# Patient Record
Sex: FEMALE | Race: WHITE | NOT HISPANIC OR LATINO | Employment: UNEMPLOYED | URBAN - METROPOLITAN AREA
[De-identification: names, ages, dates, MRNs, and addresses within clinical notes are randomized per-mention and may not be internally consistent; named-entity substitution may affect disease eponyms.]

---

## 2018-01-01 ENCOUNTER — APPOINTMENT (OUTPATIENT)
Dept: ONCOLOGY | Facility: CLINIC | Age: 58
End: 2018-01-01

## 2018-01-01 ENCOUNTER — APPOINTMENT (OUTPATIENT)
Dept: GENERAL RADIOLOGY | Facility: HOSPITAL | Age: 58
End: 2018-01-01

## 2018-01-01 ENCOUNTER — DOCUMENTATION (OUTPATIENT)
Dept: ONCOLOGY | Facility: CLINIC | Age: 58
End: 2018-01-01

## 2018-01-01 ENCOUNTER — ANESTHESIA (OUTPATIENT)
Dept: GASTROENTEROLOGY | Facility: HOSPITAL | Age: 58
End: 2018-01-01

## 2018-01-01 ENCOUNTER — OFFICE VISIT (OUTPATIENT)
Dept: ONCOLOGY | Facility: CLINIC | Age: 58
End: 2018-01-01

## 2018-01-01 ENCOUNTER — HOSPITAL ENCOUNTER (OUTPATIENT)
Facility: HOSPITAL | Age: 58
Setting detail: HOSPITAL OUTPATIENT SURGERY
End: 2018-01-29
Attending: INTERNAL MEDICINE | Admitting: INTERNAL MEDICINE

## 2018-01-01 ENCOUNTER — TELEPHONE (OUTPATIENT)
Dept: ONCOLOGY | Facility: HOSPITAL | Age: 58
End: 2018-01-01

## 2018-01-01 ENCOUNTER — LAB (OUTPATIENT)
Dept: LAB | Facility: HOSPITAL | Age: 58
End: 2018-01-01

## 2018-01-01 ENCOUNTER — DOCUMENTATION (OUTPATIENT)
Dept: ONCOLOGY | Facility: HOSPITAL | Age: 58
End: 2018-01-01

## 2018-01-01 ENCOUNTER — APPOINTMENT (OUTPATIENT)
Dept: CT IMAGING | Facility: HOSPITAL | Age: 58
End: 2018-01-01

## 2018-01-01 ENCOUNTER — APPOINTMENT (OUTPATIENT)
Dept: CT IMAGING | Facility: HOSPITAL | Age: 58
End: 2018-01-01
Attending: INTERNAL MEDICINE

## 2018-01-01 ENCOUNTER — INFUSION (OUTPATIENT)
Dept: ONCOLOGY | Facility: HOSPITAL | Age: 58
End: 2018-01-01

## 2018-01-01 ENCOUNTER — APPOINTMENT (OUTPATIENT)
Dept: OTHER | Facility: HOSPITAL | Age: 58
End: 2018-01-01

## 2018-01-01 ENCOUNTER — HOSPITAL ENCOUNTER (INPATIENT)
Facility: HOSPITAL | Age: 58
LOS: 30 days | Discharge: HOME HEALTH | End: 2018-02-02
Attending: PHYSICAL MEDICINE & REHABILITATION | Admitting: PHYSICAL MEDICINE & REHABILITATION

## 2018-01-01 ENCOUNTER — APPOINTMENT (OUTPATIENT)
Dept: GENERAL RADIOLOGY | Facility: HOSPITAL | Age: 58
End: 2018-01-01
Attending: INTERNAL MEDICINE

## 2018-01-01 ENCOUNTER — APPOINTMENT (OUTPATIENT)
Dept: LAB | Facility: HOSPITAL | Age: 58
End: 2018-01-01

## 2018-01-01 ENCOUNTER — RADIATION ONCOLOGY WEEKLY ASSESSMENT (OUTPATIENT)
Dept: RADIATION ONCOLOGY | Facility: HOSPITAL | Age: 58
End: 2018-01-01

## 2018-01-01 ENCOUNTER — APPOINTMENT (OUTPATIENT)
Dept: PET IMAGING | Facility: HOSPITAL | Age: 58
End: 2018-01-01

## 2018-01-01 ENCOUNTER — DOCUMENTATION (OUTPATIENT)
Dept: RADIATION ONCOLOGY | Facility: HOSPITAL | Age: 58
End: 2018-01-01

## 2018-01-01 ENCOUNTER — HOSPITAL ENCOUNTER (OUTPATIENT)
Dept: CT IMAGING | Facility: HOSPITAL | Age: 58
Discharge: HOME | End: 2018-03-09
Attending: INTERNAL MEDICINE | Admitting: INTERNAL MEDICINE

## 2018-01-01 ENCOUNTER — APPOINTMENT (OUTPATIENT)
Dept: CARDIOLOGY | Facility: HOSPITAL | Age: 58
End: 2018-01-01
Attending: INTERNAL MEDICINE

## 2018-01-01 ENCOUNTER — HOSPITAL ENCOUNTER (OUTPATIENT)
Dept: MRI IMAGING | Facility: HOSPITAL | Age: 58
Discharge: HOME | End: 2018-03-09
Attending: INTERNAL MEDICINE

## 2018-01-01 ENCOUNTER — HOSPITAL ENCOUNTER (INPATIENT)
Facility: HOSPITAL | Age: 58
LOS: 3 days | End: 2018-03-27
Attending: EMERGENCY MEDICINE | Admitting: INTERNAL MEDICINE

## 2018-01-01 ENCOUNTER — TELEPHONE (OUTPATIENT)
Dept: NEUROLOGY | Facility: CLINIC | Age: 58
End: 2018-01-01

## 2018-01-01 ENCOUNTER — APPOINTMENT (OUTPATIENT)
Dept: MRI IMAGING | Facility: HOSPITAL | Age: 58
End: 2018-01-01

## 2018-01-01 ENCOUNTER — ANESTHESIA EVENT (OUTPATIENT)
Dept: GASTROENTEROLOGY | Facility: HOSPITAL | Age: 58
End: 2018-01-01

## 2018-01-01 VITALS
WEIGHT: 124.12 LBS | OXYGEN SATURATION: 90 % | DIASTOLIC BLOOD PRESSURE: 67 MMHG | HEART RATE: 92 BPM | BODY MASS INDEX: 20.68 KG/M2 | TEMPERATURE: 97.7 F | SYSTOLIC BLOOD PRESSURE: 102 MMHG | RESPIRATION RATE: 16 BRPM | HEIGHT: 65 IN

## 2018-01-01 VITALS
TEMPERATURE: 97.9 F | WEIGHT: 126 LBS | HEART RATE: 95 BPM | SYSTOLIC BLOOD PRESSURE: 109 MMHG | RESPIRATION RATE: 14 BRPM | BODY MASS INDEX: 20.97 KG/M2 | OXYGEN SATURATION: 95 % | DIASTOLIC BLOOD PRESSURE: 72 MMHG

## 2018-01-01 VITALS
OXYGEN SATURATION: 85 % | RESPIRATION RATE: 36 BRPM | DIASTOLIC BLOOD PRESSURE: 83 MMHG | WEIGHT: 128.31 LBS | BODY MASS INDEX: 20.62 KG/M2 | HEART RATE: 131 BPM | HEIGHT: 66 IN | SYSTOLIC BLOOD PRESSURE: 147 MMHG | TEMPERATURE: 97.9 F

## 2018-01-01 VITALS
HEART RATE: 120 BPM | OXYGEN SATURATION: 93 % | DIASTOLIC BLOOD PRESSURE: 64 MMHG | TEMPERATURE: 97.5 F | SYSTOLIC BLOOD PRESSURE: 107 MMHG | BODY MASS INDEX: 21.55 KG/M2 | WEIGHT: 129.5 LBS

## 2018-01-01 VITALS
OXYGEN SATURATION: 95 % | SYSTOLIC BLOOD PRESSURE: 122 MMHG | HEART RATE: 89 BPM | DIASTOLIC BLOOD PRESSURE: 74 MMHG | TEMPERATURE: 97.8 F | RESPIRATION RATE: 16 BRPM

## 2018-01-01 DIAGNOSIS — R50.9 FEVER, UNSPECIFIED FEVER CAUSE: ICD-10-CM

## 2018-01-01 DIAGNOSIS — C34.32 MALIGNANT NEOPLASM OF LOWER LOBE OF LEFT LUNG (HCC): ICD-10-CM

## 2018-01-01 DIAGNOSIS — D50.9 MICROCYTIC ANEMIA: ICD-10-CM

## 2018-01-01 DIAGNOSIS — C79.51 BONE METASTASIS: Primary | ICD-10-CM

## 2018-01-01 DIAGNOSIS — J18.9 OBSTRUCTIVE PNEUMONIA: ICD-10-CM

## 2018-01-01 DIAGNOSIS — C79.51 BONE METASTASIS: ICD-10-CM

## 2018-01-01 DIAGNOSIS — C34.32 MALIGNANT NEOPLASM OF LOWER LOBE OF LEFT LUNG (HCC): Primary | ICD-10-CM

## 2018-01-01 DIAGNOSIS — R79.89 ELEVATED LACTIC ACID LEVEL: ICD-10-CM

## 2018-01-01 DIAGNOSIS — C34.90 STAGE 4 MALIGNANT NEOPLASM OF LUNG, UNSPECIFIED LATERALITY (HCC): ICD-10-CM

## 2018-01-01 DIAGNOSIS — J96.00 ACUTE RESPIRATORY FAILURE, UNSPECIFIED WHETHER WITH HYPOXIA OR HYPERCAPNIA (HCC): Primary | ICD-10-CM

## 2018-01-01 DIAGNOSIS — D68.9 COAGULOPATHY (HCC): ICD-10-CM

## 2018-01-01 DIAGNOSIS — C34.30 MALIGNANT NEOPLASM OF LOWER LOBE OF LUNG, UNSPECIFIED LATERALITY (HCC): Primary | ICD-10-CM

## 2018-01-01 DIAGNOSIS — R50.9 FEBRILE ILLNESS: ICD-10-CM

## 2018-01-01 DIAGNOSIS — R13.12 OROPHARYNGEAL DYSPHAGIA: ICD-10-CM

## 2018-01-01 DIAGNOSIS — J90 PLEURAL EFFUSION ON LEFT: ICD-10-CM

## 2018-01-01 DIAGNOSIS — C34.92 PRIMARY LUNG CANCER, LEFT (HCC): ICD-10-CM

## 2018-01-01 LAB
ABO GROUP BLD: NORMAL
ALBUMIN SERPL-MCNC: 2.7 G/DL (ref 3.5–5.2)
ALBUMIN SERPL-MCNC: 2.8 G/DL (ref 3.5–5.2)
ALBUMIN SERPL-MCNC: 3.1 G/DL (ref 3.5–5.2)
ALBUMIN SERPL-MCNC: 3.2 G/DL (ref 3.5–5.2)
ALBUMIN/GLOB SERPL: 0.7 G/DL
ALBUMIN/GLOB SERPL: 0.8 G/DL
ALBUMIN/GLOB SERPL: 0.9 G/DL
ALP SERPL-CCNC: 106 U/L (ref 39–117)
ALP SERPL-CCNC: 55 U/L (ref 39–117)
ALP SERPL-CCNC: 70 U/L (ref 39–117)
ALP SERPL-CCNC: 71 U/L (ref 39–117)
ALP SERPL-CCNC: 80 U/L (ref 40–129)
ALP SERPL-CCNC: 89 U/L (ref 40–129)
ALT SERPL W P-5'-P-CCNC: 11 U/L (ref 1–33)
ALT SERPL W P-5'-P-CCNC: 13 U/L (ref 1–33)
ALT SERPL W P-5'-P-CCNC: 16 U/L (ref 5–33)
ALT SERPL W P-5'-P-CCNC: 28 U/L (ref 1–33)
ALT SERPL W P-5'-P-CCNC: 5 U/L (ref 1–33)
ALT SERPL W P-5'-P-CCNC: 7 U/L (ref 1–33)
ALT SERPL W P-5'-P-CCNC: 8 U/L (ref 1–33)
ALT SERPL W P-5'-P-CCNC: 9 U/L (ref 5–33)
ANION GAP SERPL CALCULATED.3IONS-SCNC: 12.6 MMOL/L
ANION GAP SERPL CALCULATED.3IONS-SCNC: 13.1 MMOL/L
ANION GAP SERPL CALCULATED.3IONS-SCNC: 13.4 MMOL/L
ANION GAP SERPL CALCULATED.3IONS-SCNC: 13.8 MMOL/L
ANION GAP SERPL CALCULATED.3IONS-SCNC: 14.1 MMOL/L
ANION GAP SERPL CALCULATED.3IONS-SCNC: 14.4 MMOL/L
ANION GAP SERPL CALCULATED.3IONS-SCNC: 14.4 MMOL/L
ANION GAP SERPL CALCULATED.3IONS-SCNC: 14.5 MMOL/L
ANION GAP SERPL CALCULATED.3IONS-SCNC: 14.6 MMOL/L
ANION GAP SERPL CALCULATED.3IONS-SCNC: 14.7 MMOL/L
ANION GAP SERPL CALCULATED.3IONS-SCNC: 16.9 MMOL/L
ANION GAP SERPL CALCULATED.3IONS-SCNC: 18.6 MMOL/L
ANION GAP SERPL CALCULATED.3IONS-SCNC: 9.5 MMOL/L
APTT PPP: 105.6 SECONDS (ref 22.7–35.4)
APTT PPP: 31.4 SECONDS (ref 22.7–35.4)
APTT PPP: 35.6 SECONDS (ref 22.7–35.4)
APTT PPP: 36.1 SECONDS (ref 22.7–35.4)
APTT PPP: 37.6 SECONDS (ref 22.7–35.4)
APTT PPP: 38.2 SECONDS (ref 22.7–35.4)
APTT PPP: 38.4 SECONDS (ref 22.7–35.4)
APTT PPP: 38.9 SECONDS (ref 22.7–35.4)
APTT PPP: 39.6 SECONDS (ref 22.7–35.4)
APTT PPP: 41.7 SECONDS (ref 22.7–35.4)
APTT PPP: 47.9 SECONDS (ref 22.7–35.4)
APTT PPP: 51.1 SECONDS (ref 22.7–35.4)
APTT PPP: 52.9 SECONDS (ref 22.7–35.4)
APTT PPP: 53.2 SECONDS (ref 22.7–35.4)
APTT PPP: 54.8 SECONDS (ref 22.7–35.4)
APTT PPP: 56.5 SECONDS (ref 22.7–35.4)
APTT PPP: 61 SECONDS (ref 22.7–35.4)
APTT PPP: 61.3 SECONDS (ref 22.7–35.4)
APTT PPP: 61.7 SECONDS (ref 22.7–35.4)
APTT PPP: 62.8 SECONDS (ref 22.7–35.4)
APTT PPP: 65.9 SECONDS (ref 22.7–35.4)
APTT PPP: 69 SECONDS (ref 22.7–35.4)
APTT PPP: 70.2 SECONDS (ref 22.7–35.4)
APTT PPP: 71.1 SECONDS (ref 22.7–35.4)
APTT PPP: 76.3 SECONDS (ref 22.7–35.4)
APTT PPP: 81.2 SECONDS (ref 22.7–35.4)
APTT PPP: 91.6 SECONDS (ref 22.7–35.4)
APTT PPP: 92.7 SECONDS (ref 22.7–35.4)
APTT PPP: 96.5 SECONDS (ref 22.7–35.4)
ARTERIAL PATENCY WRIST A: ABNORMAL
ARTERIAL PATENCY WRIST A: POSITIVE
AST SERPL-CCNC: 14 U/L (ref 5–32)
AST SERPL-CCNC: 16 U/L (ref 1–32)
AST SERPL-CCNC: 18 U/L (ref 5–32)
AST SERPL-CCNC: 21 U/L (ref 1–32)
AST SERPL-CCNC: 22 U/L (ref 1–32)
AST SERPL-CCNC: 30 U/L (ref 1–32)
AST SERPL-CCNC: 9 U/L (ref 1–32)
AST SERPL-CCNC: 9 U/L (ref 1–32)
ATMOSPHERIC PRESS: 751.4 MMHG
ATMOSPHERIC PRESS: 752.4 MMHG
BACTERIA SPEC AEROBE CULT: NORMAL
BACTERIA UR QL AUTO: ABNORMAL /HPF
BASE EXCESS BLDA CALC-SCNC: -2.1 MMOL/L (ref 0–2)
BASE EXCESS BLDA CALC-SCNC: 5.8 MMOL/L (ref 0–2)
BASOPHILS # BLD AUTO: 0.01 10*3/MM3 (ref 0–0.2)
BASOPHILS # BLD AUTO: 0.02 10*3/MM3 (ref 0–0.2)
BASOPHILS # BLD AUTO: 0.03 10*3/MM3 (ref 0–0.2)
BASOPHILS NFR BLD AUTO: 0.1 % (ref 0–1.5)
BASOPHILS NFR BLD AUTO: 0.2 % (ref 0–1.5)
BASOPHILS NFR BLD AUTO: 0.2 % (ref 0–2)
BASOPHILS NFR BLD AUTO: 0.2 % (ref 0–2)
BDY SITE: ABNORMAL
BDY SITE: ABNORMAL
BH CV LOW VAS RIGHT GASTRONEMIUS VESSEL: 1
BH CV LOW VAS RIGHT PERONEAL VESSEL: 1
BH CV LOWER VASCULAR LEFT COMMON FEMORAL AUGMENT: NORMAL
BH CV LOWER VASCULAR LEFT COMMON FEMORAL COMPETENT: NORMAL
BH CV LOWER VASCULAR LEFT COMMON FEMORAL COMPRESS: NORMAL
BH CV LOWER VASCULAR LEFT COMMON FEMORAL PHASIC: NORMAL
BH CV LOWER VASCULAR LEFT COMMON FEMORAL SPONT: NORMAL
BH CV LOWER VASCULAR LEFT DISTAL FEMORAL COMPRESS: NORMAL
BH CV LOWER VASCULAR LEFT GASTRONEMIUS COMPRESS: NORMAL
BH CV LOWER VASCULAR LEFT GREATER SAPH AK COMPRESS: NORMAL
BH CV LOWER VASCULAR LEFT GREATER SAPH BK COMPRESS: NORMAL
BH CV LOWER VASCULAR LEFT LESSER SAPH COMPRESS: NORMAL
BH CV LOWER VASCULAR LEFT MID FEMORAL AUGMENT: NORMAL
BH CV LOWER VASCULAR LEFT MID FEMORAL COMPETENT: NORMAL
BH CV LOWER VASCULAR LEFT MID FEMORAL COMPRESS: NORMAL
BH CV LOWER VASCULAR LEFT MID FEMORAL PHASIC: NORMAL
BH CV LOWER VASCULAR LEFT MID FEMORAL SPONT: NORMAL
BH CV LOWER VASCULAR LEFT PERONEAL COMPRESS: NORMAL
BH CV LOWER VASCULAR LEFT POPLITEAL AUGMENT: NORMAL
BH CV LOWER VASCULAR LEFT POPLITEAL COMPETENT: NORMAL
BH CV LOWER VASCULAR LEFT POPLITEAL COMPRESS: NORMAL
BH CV LOWER VASCULAR LEFT POPLITEAL PHASIC: NORMAL
BH CV LOWER VASCULAR LEFT POPLITEAL SPONT: NORMAL
BH CV LOWER VASCULAR LEFT POSTERIOR TIBIAL COMPRESS: NORMAL
BH CV LOWER VASCULAR LEFT PROXIMAL FEMORAL COMPRESS: NORMAL
BH CV LOWER VASCULAR LEFT SAPHENOFEMORAL JUNCTION COMPRESS: NORMAL
BH CV LOWER VASCULAR LEFT SAPHENOFEMORAL JUNCTION PHASIC: NORMAL
BH CV LOWER VASCULAR LEFT SAPHENOFEMORAL JUNCTION SPONT: NORMAL
BH CV LOWER VASCULAR RIGHT COMMON FEMORAL AUGMENT: NORMAL
BH CV LOWER VASCULAR RIGHT COMMON FEMORAL COMPETENT: NORMAL
BH CV LOWER VASCULAR RIGHT COMMON FEMORAL COMPRESS: NORMAL
BH CV LOWER VASCULAR RIGHT COMMON FEMORAL PHASIC: NORMAL
BH CV LOWER VASCULAR RIGHT COMMON FEMORAL SPONT: NORMAL
BH CV LOWER VASCULAR RIGHT DISTAL FEMORAL COMPRESS: NORMAL
BH CV LOWER VASCULAR RIGHT GASTRONEMIUS COMPRESS: NORMAL
BH CV LOWER VASCULAR RIGHT GASTRONEMIUS THROMBUS: NORMAL
BH CV LOWER VASCULAR RIGHT GREATER SAPH AK COMPRESS: NORMAL
BH CV LOWER VASCULAR RIGHT GREATER SAPH BK COMPRESS: NORMAL
BH CV LOWER VASCULAR RIGHT LESSER SAPH COMPRESS: NORMAL
BH CV LOWER VASCULAR RIGHT MID FEMORAL AUGMENT: NORMAL
BH CV LOWER VASCULAR RIGHT MID FEMORAL COMPETENT: NORMAL
BH CV LOWER VASCULAR RIGHT MID FEMORAL COMPRESS: NORMAL
BH CV LOWER VASCULAR RIGHT MID FEMORAL PHASIC: NORMAL
BH CV LOWER VASCULAR RIGHT MID FEMORAL SPONT: NORMAL
BH CV LOWER VASCULAR RIGHT PERONEAL COMPRESS: NORMAL
BH CV LOWER VASCULAR RIGHT PERONEAL THROMBUS: NORMAL
BH CV LOWER VASCULAR RIGHT POPLITEAL AUGMENT: NORMAL
BH CV LOWER VASCULAR RIGHT POPLITEAL COMPETENT: NORMAL
BH CV LOWER VASCULAR RIGHT POPLITEAL COMPRESS: NORMAL
BH CV LOWER VASCULAR RIGHT POPLITEAL PHASIC: NORMAL
BH CV LOWER VASCULAR RIGHT POPLITEAL SPONT: NORMAL
BH CV LOWER VASCULAR RIGHT POSTERIOR TIBIAL COMPRESS: NORMAL
BH CV LOWER VASCULAR RIGHT PROXIMAL FEMORAL COMPRESS: NORMAL
BH CV LOWER VASCULAR RIGHT SAPHENOFEMORAL JUNCTION COMPRESS: NORMAL
BH CV LOWER VASCULAR RIGHT SAPHENOFEMORAL JUNCTION PHASIC: NORMAL
BH CV LOWER VASCULAR RIGHT SAPHENOFEMORAL JUNCTION SPONT: NORMAL
BILIRUB SERPL-MCNC: 0.2 MG/DL (ref 0.1–1.2)
BILIRUB SERPL-MCNC: 0.2 MG/DL (ref 0.1–1.2)
BILIRUB SERPL-MCNC: 0.2 MG/DL (ref 0.2–1.2)
BILIRUB SERPL-MCNC: 0.2 MG/DL (ref 0.2–1.2)
BILIRUB SERPL-MCNC: 0.3 MG/DL (ref 0.1–1.2)
BILIRUB SERPL-MCNC: 0.3 MG/DL (ref 0.1–1.2)
BILIRUB SERPL-MCNC: 0.5 MG/DL (ref 0.1–1.2)
BILIRUB SERPL-MCNC: <0.2 MG/DL (ref 0.1–1.2)
BILIRUB UR QL STRIP: NEGATIVE
BLD GP AB SCN SERPL QL: NEGATIVE
BUN BLD-MCNC: 10 MG/DL (ref 6–20)
BUN BLD-MCNC: 13 MG/DL (ref 6–20)
BUN BLD-MCNC: 14 MG/DL (ref 6–20)
BUN BLD-MCNC: 15 MG/DL (ref 6–20)
BUN BLD-MCNC: 16 MG/DL (ref 6–20)
BUN BLD-MCNC: 16 MG/DL (ref 6–20)
BUN BLD-MCNC: 17 MG/DL (ref 6–20)
BUN BLD-MCNC: 23 MG/DL (ref 6–20)
BUN BLD-MCNC: 24 MG/DL (ref 6–20)
BUN/CREAT SERPL: 18.1 (ref 7–25)
BUN/CREAT SERPL: 19.7 (ref 7–25)
BUN/CREAT SERPL: 21.1 (ref 7–25)
BUN/CREAT SERPL: 25.4 (ref 7–25)
BUN/CREAT SERPL: 26 (ref 7–25)
BUN/CREAT SERPL: 27 (ref 7–25)
BUN/CREAT SERPL: 29.1 (ref 7–25)
BUN/CREAT SERPL: 29.2 (ref 7–25)
BUN/CREAT SERPL: 29.4 (ref 7–25)
BUN/CREAT SERPL: 32.1 (ref 7–25)
BUN/CREAT SERPL: 32.1 (ref 7–25)
BUN/CREAT SERPL: 34.3 (ref 7–25)
BUN/CREAT SERPL: 35.3 (ref 7–25)
CALCIUM SPEC-SCNC: 6.1 MG/DL (ref 8.6–10.5)
CALCIUM SPEC-SCNC: 8.2 MG/DL (ref 8.6–10.5)
CALCIUM SPEC-SCNC: 8.5 MG/DL (ref 8.6–10.5)
CALCIUM SPEC-SCNC: 8.5 MG/DL (ref 8.6–10.5)
CALCIUM SPEC-SCNC: 8.6 MG/DL (ref 8.6–10.5)
CALCIUM SPEC-SCNC: 8.8 MG/DL (ref 8.6–10.5)
CALCIUM SPEC-SCNC: 8.8 MG/DL (ref 8.6–10.5)
CALCIUM SPEC-SCNC: 8.9 MG/DL (ref 8.6–10.5)
CALCIUM SPEC-SCNC: 9 MG/DL (ref 8.6–10.5)
CALCIUM SPEC-SCNC: 9 MG/DL (ref 8.6–10.5)
CALCIUM SPEC-SCNC: 9.1 MG/DL (ref 8.6–10.5)
CALCIUM SPEC-SCNC: 9.2 MG/DL (ref 8.6–10.5)
CALCIUM SPEC-SCNC: 9.3 MG/DL (ref 8.6–10.5)
CHLORIDE SERPL-SCNC: 100 MMOL/L (ref 98–107)
CHLORIDE SERPL-SCNC: 100 MMOL/L (ref 98–107)
CHLORIDE SERPL-SCNC: 104 MMOL/L (ref 98–107)
CHLORIDE SERPL-SCNC: 106 MMOL/L (ref 98–107)
CHLORIDE SERPL-SCNC: 93 MMOL/L (ref 98–107)
CHLORIDE SERPL-SCNC: 94 MMOL/L (ref 98–107)
CHLORIDE SERPL-SCNC: 94 MMOL/L (ref 98–107)
CHLORIDE SERPL-SCNC: 96 MMOL/L (ref 98–107)
CHLORIDE SERPL-SCNC: 96 MMOL/L (ref 98–107)
CHLORIDE SERPL-SCNC: 97 MMOL/L (ref 98–107)
CHLORIDE SERPL-SCNC: 97 MMOL/L (ref 98–107)
CHLORIDE SERPL-SCNC: 98 MMOL/L (ref 98–107)
CHLORIDE SERPL-SCNC: 98 MMOL/L (ref 98–107)
CLARITY UR: CLEAR
CO2 SERPL-SCNC: 18.4 MMOL/L (ref 22–29)
CO2 SERPL-SCNC: 23.3 MMOL/L (ref 22–29)
CO2 SERPL-SCNC: 23.4 MMOL/L (ref 22–29)
CO2 SERPL-SCNC: 24.6 MMOL/L (ref 22–29)
CO2 SERPL-SCNC: 24.9 MMOL/L (ref 22–29)
CO2 SERPL-SCNC: 25.1 MMOL/L (ref 22–29)
CO2 SERPL-SCNC: 25.9 MMOL/L (ref 22–29)
CO2 SERPL-SCNC: 26.2 MMOL/L (ref 22–29)
CO2 SERPL-SCNC: 26.4 MMOL/L (ref 22–29)
CO2 SERPL-SCNC: 26.5 MMOL/L (ref 22–29)
CO2 SERPL-SCNC: 30.5 MMOL/L (ref 22–29)
COLOR UR: YELLOW
CREAT BLD-MCNC: 0.34 MG/DL (ref 0.57–1)
CREAT BLD-MCNC: 0.48 MG/DL (ref 0.57–1)
CREAT BLD-MCNC: 0.5 MG/DL (ref 0.57–1)
CREAT BLD-MCNC: 0.53 MG/DL (ref 0.57–1)
CREAT BLD-MCNC: 0.53 MG/DL (ref 0.57–1)
CREAT BLD-MCNC: 0.55 MG/DL (ref 0.57–1)
CREAT BLD-MCNC: 0.63 MG/DL (ref 0.57–1)
CREAT BLD-MCNC: 0.63 MG/DL (ref 0.57–1)
CREAT BLD-MCNC: 0.66 MG/DL (ref 0.57–1)
CREAT BLD-MCNC: 0.67 MG/DL (ref 0.57–1)
CREAT BLD-MCNC: 0.68 MG/DL (ref 0.57–1)
CREAT BLD-MCNC: 0.71 MG/DL (ref 0.57–1)
CREAT BLD-MCNC: 0.72 MG/DL (ref 0.57–1)
D-LACTATE SERPL-SCNC: 1.7 MMOL/L (ref 0.5–2)
D-LACTATE SERPL-SCNC: 2.7 MMOL/L (ref 0.5–2)
DEPRECATED RDW RBC AUTO: 43.7 FL (ref 37–54)
DEPRECATED RDW RBC AUTO: 44.8 FL (ref 37–54)
DEPRECATED RDW RBC AUTO: 44.9 FL (ref 37–54)
DEPRECATED RDW RBC AUTO: 45.2 FL (ref 37–54)
DEPRECATED RDW RBC AUTO: 45.6 FL (ref 37–54)
DEPRECATED RDW RBC AUTO: 45.7 FL (ref 37–54)
DEPRECATED RDW RBC AUTO: 45.8 FL (ref 37–54)
DEPRECATED RDW RBC AUTO: 45.9 FL (ref 37–54)
DEPRECATED RDW RBC AUTO: 46 FL (ref 37–54)
DEPRECATED RDW RBC AUTO: 46.2 FL (ref 37–54)
DEPRECATED RDW RBC AUTO: 46.5 FL (ref 37–54)
DEPRECATED RDW RBC AUTO: 47.3 FL (ref 37–54)
DEPRECATED RDW RBC AUTO: 47.4 FL (ref 37–54)
DEPRECATED RDW RBC AUTO: 47.5 FL (ref 37–54)
DEPRECATED RDW RBC AUTO: 47.7 FL (ref 37–54)
DEPRECATED RDW RBC AUTO: 47.8 FL (ref 37–54)
DEPRECATED RDW RBC AUTO: 48 FL (ref 37–54)
DEPRECATED RDW RBC AUTO: 48 FL (ref 37–54)
DEPRECATED RDW RBC AUTO: 48.3 FL (ref 37–54)
DEPRECATED RDW RBC AUTO: 49 FL (ref 37–54)
DEPRECATED RDW RBC AUTO: 49.3 FL (ref 37–54)
DEPRECATED RDW RBC AUTO: 49.4 FL (ref 37–54)
DEPRECATED RDW RBC AUTO: 50.5 FL (ref 37–54)
DEPRECATED RDW RBC AUTO: 50.6 FL (ref 37–54)
DEPRECATED RDW RBC AUTO: 51.4 FL (ref 37–54)
DEPRECATED RDW RBC AUTO: 51.9 FL (ref 37–54)
DEPRECATED RDW RBC AUTO: 52.2 FL (ref 37–54)
EOSINOPHIL # BLD AUTO: 0.03 10*3/MM3 (ref 0.1–0.3)
EOSINOPHIL # BLD AUTO: 0.03 10*3/MM3 (ref 0–0.7)
EOSINOPHIL # BLD AUTO: 0.09 10*3/MM3 (ref 0–0.7)
EOSINOPHIL # BLD AUTO: 0.14 10*3/MM3 (ref 0–0.7)
EOSINOPHIL # BLD AUTO: 0.14 10*3/MM3 (ref 0–0.7)
EOSINOPHIL # BLD AUTO: 0.15 10*3/MM3 (ref 0–0.7)
EOSINOPHIL # BLD AUTO: 0.16 10*3/MM3 (ref 0–0.7)
EOSINOPHIL # BLD AUTO: 0.16 10*3/MM3 (ref 0–0.7)
EOSINOPHIL # BLD AUTO: 0.2 10*3/MM3 (ref 0.1–0.3)
EOSINOPHIL # BLD AUTO: 0.24 10*3/MM3 (ref 0–0.7)
EOSINOPHIL # BLD AUTO: 0.25 10*3/MM3 (ref 0–0.7)
EOSINOPHIL # BLD AUTO: 0.26 10*3/MM3 (ref 0–0.7)
EOSINOPHIL # BLD AUTO: 0.3 10*3/MM3 (ref 0–0.7)
EOSINOPHIL # BLD AUTO: 0.3 10*3/MM3 (ref 0–0.7)
EOSINOPHIL # BLD AUTO: 0.31 10*3/MM3 (ref 0–0.7)
EOSINOPHIL # BLD AUTO: 0.31 10*3/MM3 (ref 0–0.7)
EOSINOPHIL # BLD AUTO: 0.32 10*3/MM3 (ref 0–0.7)
EOSINOPHIL # BLD AUTO: 0.35 10*3/MM3 (ref 0–0.7)
EOSINOPHIL # BLD AUTO: 0.36 10*3/MM3 (ref 0–0.7)
EOSINOPHIL # BLD AUTO: 0.37 10*3/MM3 (ref 0–0.7)
EOSINOPHIL # BLD AUTO: 0.38 10*3/MM3 (ref 0–0.7)
EOSINOPHIL # BLD AUTO: 0.38 10*3/MM3 (ref 0–0.7)
EOSINOPHIL # BLD AUTO: 0.39 10*3/MM3 (ref 0–0.7)
EOSINOPHIL # BLD AUTO: 0.39 10*3/MM3 (ref 0–0.7)
EOSINOPHIL # BLD AUTO: 0.4 10*3/MM3 (ref 0–0.7)
EOSINOPHIL NFR BLD AUTO: 0.2 % (ref 0.3–6.2)
EOSINOPHIL NFR BLD AUTO: 0.2 % (ref 0–4)
EOSINOPHIL NFR BLD AUTO: 0.5 % (ref 0.3–6.2)
EOSINOPHIL NFR BLD AUTO: 0.7 % (ref 0.3–6.2)
EOSINOPHIL NFR BLD AUTO: 0.8 % (ref 0.3–6.2)
EOSINOPHIL NFR BLD AUTO: 0.9 % (ref 0.3–6.2)
EOSINOPHIL NFR BLD AUTO: 1.3 % (ref 0.3–6.2)
EOSINOPHIL NFR BLD AUTO: 1.3 % (ref 0.3–6.2)
EOSINOPHIL NFR BLD AUTO: 1.6 % (ref 0–4)
EOSINOPHIL NFR BLD AUTO: 2 % (ref 0.3–6.2)
EOSINOPHIL NFR BLD AUTO: 2.4 % (ref 0.3–6.2)
EOSINOPHIL NFR BLD AUTO: 2.5 % (ref 0.3–6.2)
EOSINOPHIL NFR BLD AUTO: 2.6 % (ref 0.3–6.2)
EOSINOPHIL NFR BLD AUTO: 2.7 % (ref 0.3–6.2)
EOSINOPHIL NFR BLD AUTO: 2.8 % (ref 0.3–6.2)
EOSINOPHIL NFR BLD AUTO: 2.9 % (ref 0.3–6.2)
EOSINOPHIL NFR BLD AUTO: 3 % (ref 0.3–6.2)
EOSINOPHIL NFR BLD AUTO: 3.1 % (ref 0.3–6.2)
EOSINOPHIL NFR BLD AUTO: 3.3 % (ref 0.3–6.2)
EOSINOPHIL NFR BLD AUTO: 3.5 % (ref 0.3–6.2)
EOSINOPHIL NFR BLD AUTO: 3.5 % (ref 0.3–6.2)
EOSINOPHIL NFR BLD AUTO: 3.8 % (ref 0.3–6.2)
EOSINOPHIL NFR BLD AUTO: 3.9 % (ref 0.3–6.2)
EOSINOPHIL NFR BLD AUTO: 4 % (ref 0.3–6.2)
EOSINOPHIL NFR BLD AUTO: 4.2 % (ref 0.3–6.2)
ERYTHROCYTE [DISTWIDTH] IN BLOOD BY AUTOMATED COUNT: 14.3 % (ref 11.7–13)
ERYTHROCYTE [DISTWIDTH] IN BLOOD BY AUTOMATED COUNT: 14.4 % (ref 11.7–13)
ERYTHROCYTE [DISTWIDTH] IN BLOOD BY AUTOMATED COUNT: 14.5 % (ref 11.7–13)
ERYTHROCYTE [DISTWIDTH] IN BLOOD BY AUTOMATED COUNT: 14.5 % (ref 11.7–13)
ERYTHROCYTE [DISTWIDTH] IN BLOOD BY AUTOMATED COUNT: 14.6 % (ref 11.7–13)
ERYTHROCYTE [DISTWIDTH] IN BLOOD BY AUTOMATED COUNT: 14.7 % (ref 11.7–13)
ERYTHROCYTE [DISTWIDTH] IN BLOOD BY AUTOMATED COUNT: 14.7 % (ref 11.7–13)
ERYTHROCYTE [DISTWIDTH] IN BLOOD BY AUTOMATED COUNT: 15 % (ref 11.7–13)
ERYTHROCYTE [DISTWIDTH] IN BLOOD BY AUTOMATED COUNT: 15.1 % (ref 11.7–13)
ERYTHROCYTE [DISTWIDTH] IN BLOOD BY AUTOMATED COUNT: 15.2 % (ref 11.7–13)
ERYTHROCYTE [DISTWIDTH] IN BLOOD BY AUTOMATED COUNT: 15.2 % (ref 11.7–13)
ERYTHROCYTE [DISTWIDTH] IN BLOOD BY AUTOMATED COUNT: 15.4 % (ref 11.7–13)
ERYTHROCYTE [DISTWIDTH] IN BLOOD BY AUTOMATED COUNT: 15.5 % (ref 11.7–13)
ERYTHROCYTE [DISTWIDTH] IN BLOOD BY AUTOMATED COUNT: 15.9 % (ref 11.5–14.5)
ERYTHROCYTE [DISTWIDTH] IN BLOOD BY AUTOMATED COUNT: 16.7 % (ref 11.5–14.5)
ERYTHROCYTE [DISTWIDTH] IN BLOOD BY AUTOMATED COUNT: 17.2 % (ref 11.7–13)
ERYTHROCYTE [DISTWIDTH] IN BLOOD BY AUTOMATED COUNT: 17.5 % (ref 11.7–13)
ERYTHROCYTE [DISTWIDTH] IN BLOOD BY AUTOMATED COUNT: 17.5 % (ref 11.7–13)
ERYTHROCYTE [DISTWIDTH] IN BLOOD BY AUTOMATED COUNT: 17.6 % (ref 11.7–13)
ERYTHROCYTE [DISTWIDTH] IN BLOOD BY AUTOMATED COUNT: 17.8 % (ref 11.7–13)
FERRITIN SERPL-MCNC: 294.7 NG/ML (ref 13–150)
FERRITIN SERPL-MCNC: 676 NG/ML (ref 13–150)
FOLATE BLD-MCNC: 423 NG/ML
FOLATE RBC-MCNC: 1596 NG/ML
FOLATE SERPL-MCNC: 5.1 NG/ML (ref 4.78–24.2)
GAS FLOW AIRWAY: 10 LPM
GAS FLOW AIRWAY: 7 LPM
GFR SERPL CREATININE-BSD FRML MDRD: 113 ML/MIN/1.73
GFR SERPL CREATININE-BSD FRML MDRD: 118 ML/MIN/1.73
GFR SERPL CREATININE-BSD FRML MDRD: 118 ML/MIN/1.73
GFR SERPL CREATININE-BSD FRML MDRD: 126 ML/MIN/1.73
GFR SERPL CREATININE-BSD FRML MDRD: 132 ML/MIN/1.73
GFR SERPL CREATININE-BSD FRML MDRD: 83 ML/MIN/1.73
GFR SERPL CREATININE-BSD FRML MDRD: 84 ML/MIN/1.73
GFR SERPL CREATININE-BSD FRML MDRD: 89 ML/MIN/1.73
GFR SERPL CREATININE-BSD FRML MDRD: 90 ML/MIN/1.73
GFR SERPL CREATININE-BSD FRML MDRD: 92 ML/MIN/1.73
GFR SERPL CREATININE-BSD FRML MDRD: 97 ML/MIN/1.73
GFR SERPL CREATININE-BSD FRML MDRD: 97 ML/MIN/1.73
GFR SERPL CREATININE-BSD FRML MDRD: >150 ML/MIN/1.73
GLOBULIN UR ELPH-MCNC: 3.4 GM/DL
GLOBULIN UR ELPH-MCNC: 3.5 GM/DL
GLOBULIN UR ELPH-MCNC: 3.7 GM/DL
GLOBULIN UR ELPH-MCNC: 3.9 GM/DL
GLOBULIN UR ELPH-MCNC: 3.9 GM/DL
GLOBULIN UR ELPH-MCNC: 4 GM/DL
GLOBULIN UR ELPH-MCNC: 4 GM/DL
GLOBULIN UR ELPH-MCNC: 4.1 GM/DL
GLUCOSE BLD-MCNC: 106 MG/DL (ref 65–99)
GLUCOSE BLD-MCNC: 107 MG/DL (ref 65–99)
GLUCOSE BLD-MCNC: 110 MG/DL (ref 65–99)
GLUCOSE BLD-MCNC: 113 MG/DL (ref 65–99)
GLUCOSE BLD-MCNC: 114 MG/DL (ref 65–99)
GLUCOSE BLD-MCNC: 120 MG/DL (ref 65–99)
GLUCOSE BLD-MCNC: 121 MG/DL (ref 65–99)
GLUCOSE BLD-MCNC: 124 MG/DL (ref 65–99)
GLUCOSE BLD-MCNC: 126 MG/DL (ref 65–99)
GLUCOSE BLD-MCNC: 130 MG/DL (ref 65–99)
GLUCOSE BLD-MCNC: 132 MG/DL (ref 65–99)
GLUCOSE BLD-MCNC: 158 MG/DL (ref 65–99)
GLUCOSE BLD-MCNC: 94 MG/DL (ref 65–99)
GLUCOSE BLDC GLUCOMTR-MCNC: 106 MG/DL (ref 70–130)
GLUCOSE BLDC GLUCOMTR-MCNC: 106 MG/DL (ref 70–130)
GLUCOSE BLDC GLUCOMTR-MCNC: 107 MG/DL (ref 70–130)
GLUCOSE BLDC GLUCOMTR-MCNC: 108 MG/DL (ref 70–130)
GLUCOSE BLDC GLUCOMTR-MCNC: 109 MG/DL (ref 70–130)
GLUCOSE BLDC GLUCOMTR-MCNC: 112 MG/DL (ref 70–130)
GLUCOSE BLDC GLUCOMTR-MCNC: 115 MG/DL (ref 70–130)
GLUCOSE BLDC GLUCOMTR-MCNC: 115 MG/DL (ref 70–130)
GLUCOSE BLDC GLUCOMTR-MCNC: 121 MG/DL (ref 70–130)
GLUCOSE BLDC GLUCOMTR-MCNC: 123 MG/DL (ref 70–130)
GLUCOSE BLDC GLUCOMTR-MCNC: 133 MG/DL (ref 70–130)
GLUCOSE BLDC GLUCOMTR-MCNC: 158 MG/DL (ref 70–130)
GLUCOSE BLDC GLUCOMTR-MCNC: 159 MG/DL (ref 70–130)
GLUCOSE BLDC GLUCOMTR-MCNC: 159 MG/DL (ref 70–130)
GLUCOSE BLDC GLUCOMTR-MCNC: 191 MG/DL (ref 70–130)
GLUCOSE UR STRIP-MCNC: NEGATIVE MG/DL
HCO3 BLDA-SCNC: 20.6 MMOL/L (ref 22–28)
HCO3 BLDA-SCNC: 28.6 MMOL/L (ref 22–28)
HCT VFR BLD AUTO: 24.3 % (ref 35.6–45.5)
HCT VFR BLD AUTO: 24.4 % (ref 35.6–45.5)
HCT VFR BLD AUTO: 26.3 % (ref 35.6–45.5)
HCT VFR BLD AUTO: 26.5 % (ref 34–46.6)
HCT VFR BLD AUTO: 26.7 % (ref 35.6–45.5)
HCT VFR BLD AUTO: 27 % (ref 35.6–45.5)
HCT VFR BLD AUTO: 27.4 % (ref 35.6–45.5)
HCT VFR BLD AUTO: 27.6 % (ref 35.6–45.5)
HCT VFR BLD AUTO: 27.9 % (ref 35.6–45.5)
HCT VFR BLD AUTO: 27.9 % (ref 35.6–45.5)
HCT VFR BLD AUTO: 28 % (ref 35.6–45.5)
HCT VFR BLD AUTO: 28 % (ref 35.6–45.5)
HCT VFR BLD AUTO: 28.1 % (ref 35.6–45.5)
HCT VFR BLD AUTO: 28.2 % (ref 35.6–45.5)
HCT VFR BLD AUTO: 28.5 % (ref 35.6–45.5)
HCT VFR BLD AUTO: 28.7 % (ref 35.6–45.5)
HCT VFR BLD AUTO: 28.7 % (ref 37–47)
HCT VFR BLD AUTO: 28.8 % (ref 35.6–45.5)
HCT VFR BLD AUTO: 29.2 % (ref 35.6–45.5)
HCT VFR BLD AUTO: 29.4 % (ref 35.6–45.5)
HCT VFR BLD AUTO: 29.5 % (ref 37–47)
HCT VFR BLD AUTO: 30.1 % (ref 35.6–45.5)
HCT VFR BLD AUTO: 30.3 % (ref 35.6–45.5)
HCT VFR BLD AUTO: 30.6 % (ref 35.6–45.5)
HCT VFR BLD AUTO: 30.8 % (ref 35.6–45.5)
HCT VFR BLD AUTO: 30.9 % (ref 35.6–45.5)
HCT VFR BLD AUTO: 31.3 % (ref 35.6–45.5)
HCT VFR BLD AUTO: 31.4 % (ref 35.6–45.5)
HCT VFR BLD AUTO: 31.8 % (ref 35.6–45.5)
HCT VFR BLD AUTO: 32.6 % (ref 35.6–45.5)
HCT VFR BLD AUTO: 33.3 % (ref 35.6–45.5)
HCT VFR BLD AUTO: 33.7 % (ref 35.6–45.5)
HCT VFR BLD AUTO: 34 % (ref 35.6–45.5)
HGB BLD-MCNC: 10 G/DL (ref 11.9–15.5)
HGB BLD-MCNC: 10.1 G/DL (ref 11.9–15.5)
HGB BLD-MCNC: 10.4 G/DL (ref 11.9–15.5)
HGB BLD-MCNC: 10.5 G/DL (ref 11.9–15.5)
HGB BLD-MCNC: 10.8 G/DL (ref 11.9–15.5)
HGB BLD-MCNC: 7.1 G/DL (ref 11.9–15.5)
HGB BLD-MCNC: 7.2 G/DL (ref 11.9–15.5)
HGB BLD-MCNC: 7.7 G/DL (ref 11.9–15.5)
HGB BLD-MCNC: 7.8 G/DL (ref 11.9–15.5)
HGB BLD-MCNC: 8 G/DL (ref 11.9–15.5)
HGB BLD-MCNC: 8.1 G/DL (ref 11.9–15.5)
HGB BLD-MCNC: 8.3 G/DL (ref 11.9–15.5)
HGB BLD-MCNC: 8.4 G/DL (ref 11.9–15.5)
HGB BLD-MCNC: 8.5 G/DL (ref 11.9–15.5)
HGB BLD-MCNC: 8.5 G/DL (ref 11.9–15.5)
HGB BLD-MCNC: 8.6 G/DL (ref 11.9–15.5)
HGB BLD-MCNC: 8.8 G/DL (ref 11.9–15.5)
HGB BLD-MCNC: 8.8 G/DL (ref 11.9–15.5)
HGB BLD-MCNC: 8.9 G/DL (ref 11.9–15.5)
HGB BLD-MCNC: 8.9 G/DL (ref 11.9–15.5)
HGB BLD-MCNC: 8.9 G/DL (ref 12–16)
HGB BLD-MCNC: 9 G/DL (ref 12–16)
HGB BLD-MCNC: 9.1 G/DL (ref 11.9–15.5)
HGB BLD-MCNC: 9.1 G/DL (ref 11.9–15.5)
HGB BLD-MCNC: 9.3 G/DL (ref 11.9–15.5)
HGB BLD-MCNC: 9.3 G/DL (ref 11.9–15.5)
HGB BLD-MCNC: 9.5 G/DL (ref 11.9–15.5)
HGB BLD-MCNC: 9.8 G/DL (ref 11.9–15.5)
HGB RETIC QN: 18.9 PG (ref 29.8–36.1)
HGB UR QL STRIP.AUTO: NEGATIVE
HOLD SPECIMEN: NORMAL
HYALINE CASTS UR QL AUTO: ABNORMAL /LPF
IMM GRANULOCYTES # BLD: 0.03 10*3/MM3 (ref 0–0.03)
IMM GRANULOCYTES # BLD: 0.04 10*3/MM3 (ref 0–0.03)
IMM GRANULOCYTES # BLD: 0.04 10*3/MM3 (ref 0–0.03)
IMM GRANULOCYTES # BLD: 0.05 10*3/MM3 (ref 0–0.03)
IMM GRANULOCYTES # BLD: 0.06 10*3/MM3 (ref 0–0.03)
IMM GRANULOCYTES # BLD: 0.06 10*3/MM3 (ref 0–0.03)
IMM GRANULOCYTES # BLD: 0.07 10*3/MM3 (ref 0–0.03)
IMM GRANULOCYTES # BLD: 0.08 10*3/MM3 (ref 0–0.03)
IMM GRANULOCYTES # BLD: 0.08 10*3/MM3 (ref 0–0.03)
IMM GRANULOCYTES # BLD: 0.09 10*3/MM3 (ref 0–0.03)
IMM GRANULOCYTES # BLD: 0.12 10*3/MM3 (ref 0–0.03)
IMM GRANULOCYTES # BLD: 0.13 10*3/MM3 (ref 0–0.03)
IMM GRANULOCYTES # BLD: 0.14 10*3/MM3 (ref 0–0.03)
IMM GRANULOCYTES NFR BLD: 0.3 % (ref 0–0.5)
IMM GRANULOCYTES NFR BLD: 0.3 % (ref 0–0.5)
IMM GRANULOCYTES NFR BLD: 0.4 % (ref 0–0.5)
IMM GRANULOCYTES NFR BLD: 0.5 % (ref 0–0.5)
IMM GRANULOCYTES NFR BLD: 0.6 % (ref 0–0.5)
IMM GRANULOCYTES NFR BLD: 0.7 % (ref 0–0.5)
IMM GRANULOCYTES NFR BLD: 0.8 % (ref 0–0.5)
IMM RETICS NFR: 18.8 % (ref 3–15.8)
INR PPP: 1.1 (ref 0.9–1.1)
INR PPP: 1.13 (ref 0.9–1.1)
INR PPP: 1.13 (ref 0.9–1.1)
INR PPP: 1.15 (ref 0.9–1.1)
INR PPP: 1.15 (ref 0.9–1.1)
INR PPP: 1.16 (ref 0.9–1.1)
INR PPP: 1.18 (ref 0.9–1.1)
INR PPP: 1.26 (ref 0.9–1.1)
IRON 24H UR-MRATE: 13 MCG/DL (ref 37–145)
IRON 24H UR-MRATE: 18 MCG/DL (ref 37–145)
IRON SATN MFR SERPL: 8 %
IRON SATN MFR SERPL: 8 % (ref 20–50)
KETONES UR QL STRIP: NEGATIVE
LAB AP CASE REPORT: NORMAL
LAB AP CLINICAL INFORMATION: NORMAL
LEUKOCYTE ESTERASE UR QL STRIP.AUTO: NEGATIVE
LYMPHOCYTES # BLD AUTO: 0.31 10*3/MM3 (ref 0.9–4.8)
LYMPHOCYTES # BLD AUTO: 0.61 10*3/MM3 (ref 0.6–4.8)
LYMPHOCYTES # BLD AUTO: 0.76 10*3/MM3 (ref 0.9–4.8)
LYMPHOCYTES # BLD AUTO: 1.15 10*3/MM3 (ref 0.9–4.8)
LYMPHOCYTES # BLD AUTO: 1.2 10*3/MM3 (ref 0.6–4.8)
LYMPHOCYTES # BLD AUTO: 1.21 10*3/MM3 (ref 0.9–4.8)
LYMPHOCYTES # BLD AUTO: 1.3 10*3/MM3 (ref 0.9–4.8)
LYMPHOCYTES # BLD AUTO: 1.33 10*3/MM3 (ref 0.9–4.8)
LYMPHOCYTES # BLD AUTO: 1.33 10*3/MM3 (ref 0.9–4.8)
LYMPHOCYTES # BLD AUTO: 1.37 10*3/MM3 (ref 0.9–4.8)
LYMPHOCYTES # BLD AUTO: 1.39 10*3/MM3 (ref 0.9–4.8)
LYMPHOCYTES # BLD AUTO: 1.4 10*3/MM3 (ref 0.9–4.8)
LYMPHOCYTES # BLD AUTO: 1.42 10*3/MM3 (ref 0.9–4.8)
LYMPHOCYTES # BLD AUTO: 1.44 10*3/MM3 (ref 0.9–4.8)
LYMPHOCYTES # BLD AUTO: 1.49 10*3/MM3 (ref 0.9–4.8)
LYMPHOCYTES # BLD AUTO: 1.49 10*3/MM3 (ref 0.9–4.8)
LYMPHOCYTES # BLD AUTO: 1.51 10*3/MM3 (ref 0.9–4.8)
LYMPHOCYTES # BLD AUTO: 1.62 10*3/MM3 (ref 0.9–4.8)
LYMPHOCYTES # BLD AUTO: 1.63 10*3/MM3 (ref 0.9–4.8)
LYMPHOCYTES # BLD AUTO: 1.72 10*3/MM3 (ref 0.9–4.8)
LYMPHOCYTES # BLD AUTO: 1.73 10*3/MM3 (ref 0.9–4.8)
LYMPHOCYTES # BLD AUTO: 1.75 10*3/MM3 (ref 0.9–4.8)
LYMPHOCYTES # BLD AUTO: 1.79 10*3/MM3 (ref 0.9–4.8)
LYMPHOCYTES # BLD AUTO: 1.89 10*3/MM3 (ref 0.9–4.8)
LYMPHOCYTES # BLD AUTO: 1.9 10*3/MM3 (ref 0.9–4.8)
LYMPHOCYTES NFR BLD AUTO: 10.7 % (ref 19.6–45.3)
LYMPHOCYTES NFR BLD AUTO: 11.8 % (ref 19.6–45.3)
LYMPHOCYTES NFR BLD AUTO: 12.1 % (ref 19.6–45.3)
LYMPHOCYTES NFR BLD AUTO: 12.2 % (ref 19.6–45.3)
LYMPHOCYTES NFR BLD AUTO: 12.4 % (ref 19.6–45.3)
LYMPHOCYTES NFR BLD AUTO: 12.7 % (ref 19.6–45.3)
LYMPHOCYTES NFR BLD AUTO: 12.7 % (ref 19.6–45.3)
LYMPHOCYTES NFR BLD AUTO: 13.6 % (ref 19.6–45.3)
LYMPHOCYTES NFR BLD AUTO: 13.6 % (ref 19.6–45.3)
LYMPHOCYTES NFR BLD AUTO: 14.8 % (ref 19.6–45.3)
LYMPHOCYTES NFR BLD AUTO: 14.8 % (ref 19.6–45.3)
LYMPHOCYTES NFR BLD AUTO: 15 % (ref 19.6–45.3)
LYMPHOCYTES NFR BLD AUTO: 15 % (ref 19.6–45.3)
LYMPHOCYTES NFR BLD AUTO: 15.8 % (ref 19.6–45.3)
LYMPHOCYTES NFR BLD AUTO: 16.3 % (ref 19.6–45.3)
LYMPHOCYTES NFR BLD AUTO: 17 % (ref 19.6–45.3)
LYMPHOCYTES NFR BLD AUTO: 17.8 % (ref 19.6–45.3)
LYMPHOCYTES NFR BLD AUTO: 18.6 % (ref 19.6–45.3)
LYMPHOCYTES NFR BLD AUTO: 2.3 % (ref 19.6–45.3)
LYMPHOCYTES NFR BLD AUTO: 3.7 % (ref 20–45)
LYMPHOCYTES NFR BLD AUTO: 4.4 % (ref 19.6–45.3)
LYMPHOCYTES NFR BLD AUTO: 6.5 % (ref 19.6–45.3)
LYMPHOCYTES NFR BLD AUTO: 7 % (ref 19.6–45.3)
LYMPHOCYTES NFR BLD AUTO: 7.6 % (ref 19.6–45.3)
LYMPHOCYTES NFR BLD AUTO: 9.8 % (ref 20–45)
Lab: NORMAL
MAGNESIUM SERPL-MCNC: 2.2 MG/DL (ref 1.6–2.6)
MCH RBC QN AUTO: 22.8 PG (ref 26.9–32)
MCH RBC QN AUTO: 23.1 PG (ref 26.9–32)
MCH RBC QN AUTO: 23.5 PG (ref 26.9–32)
MCH RBC QN AUTO: 23.7 PG (ref 26.9–32)
MCH RBC QN AUTO: 24 PG (ref 27–31)
MCH RBC QN AUTO: 24.2 PG (ref 26.9–32)
MCH RBC QN AUTO: 24.8 PG (ref 27–31)
MCH RBC QN AUTO: 25.8 PG (ref 26.9–32)
MCH RBC QN AUTO: 25.9 PG (ref 26.9–32)
MCH RBC QN AUTO: 26.3 PG (ref 26.9–32)
MCH RBC QN AUTO: 26.4 PG (ref 26.9–32)
MCH RBC QN AUTO: 26.4 PG (ref 26.9–32)
MCH RBC QN AUTO: 26.5 PG (ref 26.9–32)
MCH RBC QN AUTO: 26.6 PG (ref 26.9–32)
MCH RBC QN AUTO: 26.7 PG (ref 26.9–32)
MCH RBC QN AUTO: 26.7 PG (ref 26.9–32)
MCH RBC QN AUTO: 26.8 PG (ref 26.9–32)
MCH RBC QN AUTO: 27.1 PG (ref 26.9–32)
MCHC RBC AUTO-ENTMCNC: 28.5 G/DL (ref 32.4–36.3)
MCHC RBC AUTO-ENTMCNC: 29 G/DL (ref 32.4–36.3)
MCHC RBC AUTO-ENTMCNC: 29.1 G/DL (ref 32.4–36.3)
MCHC RBC AUTO-ENTMCNC: 29.6 G/DL (ref 32.4–36.3)
MCHC RBC AUTO-ENTMCNC: 29.6 G/DL (ref 32.4–36.3)
MCHC RBC AUTO-ENTMCNC: 29.7 G/DL (ref 32.4–36.3)
MCHC RBC AUTO-ENTMCNC: 29.9 G/DL (ref 32.4–36.3)
MCHC RBC AUTO-ENTMCNC: 30.2 G/DL (ref 31–37)
MCHC RBC AUTO-ENTMCNC: 30.3 G/DL (ref 32.4–36.3)
MCHC RBC AUTO-ENTMCNC: 30.4 G/DL (ref 32.4–36.3)
MCHC RBC AUTO-ENTMCNC: 30.5 G/DL (ref 32.4–36.3)
MCHC RBC AUTO-ENTMCNC: 30.6 G/DL (ref 32.4–36.3)
MCHC RBC AUTO-ENTMCNC: 30.7 G/DL (ref 32.4–36.3)
MCHC RBC AUTO-ENTMCNC: 30.7 G/DL (ref 32.4–36.3)
MCHC RBC AUTO-ENTMCNC: 30.8 G/DL (ref 32.4–36.3)
MCHC RBC AUTO-ENTMCNC: 30.8 G/DL (ref 32.4–36.3)
MCHC RBC AUTO-ENTMCNC: 30.9 G/DL (ref 32.4–36.3)
MCHC RBC AUTO-ENTMCNC: 31 G/DL (ref 32.4–36.3)
MCHC RBC AUTO-ENTMCNC: 31.2 G/DL (ref 32.4–36.3)
MCHC RBC AUTO-ENTMCNC: 31.4 G/DL (ref 31–37)
MCHC RBC AUTO-ENTMCNC: 31.8 G/DL (ref 32.4–36.3)
MCHC RBC AUTO-ENTMCNC: 31.9 G/DL (ref 32.4–36.3)
MCV RBC AUTO: 78.2 FL (ref 80.5–98.2)
MCV RBC AUTO: 79.1 FL (ref 81–99)
MCV RBC AUTO: 79.2 FL (ref 80.5–98.2)
MCV RBC AUTO: 79.5 FL (ref 81–99)
MCV RBC AUTO: 80.8 FL (ref 80.5–98.2)
MCV RBC AUTO: 81.6 FL (ref 80.5–98.2)
MCV RBC AUTO: 81.8 FL (ref 80.5–98.2)
MCV RBC AUTO: 83.2 FL (ref 80.5–98.2)
MCV RBC AUTO: 84.7 FL (ref 80.5–98.2)
MCV RBC AUTO: 85.2 FL (ref 80.5–98.2)
MCV RBC AUTO: 85.3 FL (ref 80.5–98.2)
MCV RBC AUTO: 85.4 FL (ref 80.5–98.2)
MCV RBC AUTO: 85.5 FL (ref 80.5–98.2)
MCV RBC AUTO: 85.6 FL (ref 80.5–98.2)
MCV RBC AUTO: 85.6 FL (ref 80.5–98.2)
MCV RBC AUTO: 85.7 FL (ref 80.5–98.2)
MCV RBC AUTO: 85.8 FL (ref 80.5–98.2)
MCV RBC AUTO: 86 FL (ref 80.5–98.2)
MCV RBC AUTO: 86 FL (ref 80.5–98.2)
MCV RBC AUTO: 86.3 FL (ref 80.5–98.2)
MCV RBC AUTO: 86.3 FL (ref 80.5–98.2)
MCV RBC AUTO: 86.7 FL (ref 80.5–98.2)
MCV RBC AUTO: 86.8 FL (ref 80.5–98.2)
MCV RBC AUTO: 86.8 FL (ref 80.5–98.2)
MCV RBC AUTO: 86.9 FL (ref 80.5–98.2)
MCV RBC AUTO: 87 FL (ref 80.5–98.2)
MCV RBC AUTO: 87 FL (ref 80.5–98.2)
MODALITY: ABNORMAL
MODALITY: ABNORMAL
MONOCYTES # BLD AUTO: 0.68 10*3/MM3 (ref 0.2–1.2)
MONOCYTES # BLD AUTO: 0.69 10*3/MM3 (ref 0.2–1.2)
MONOCYTES # BLD AUTO: 0.79 10*3/MM3 (ref 0.2–1.2)
MONOCYTES # BLD AUTO: 0.79 10*3/MM3 (ref 0.2–1.2)
MONOCYTES # BLD AUTO: 0.83 10*3/MM3 (ref 0.2–1.2)
MONOCYTES # BLD AUTO: 0.83 10*3/MM3 (ref 0.2–1.2)
MONOCYTES # BLD AUTO: 0.84 10*3/MM3 (ref 0.2–1.2)
MONOCYTES # BLD AUTO: 0.85 10*3/MM3 (ref 0.2–1.2)
MONOCYTES # BLD AUTO: 0.85 10*3/MM3 (ref 0.2–1.2)
MONOCYTES # BLD AUTO: 0.86 10*3/MM3 (ref 0.2–1.2)
MONOCYTES # BLD AUTO: 0.88 10*3/MM3 (ref 0.2–1.2)
MONOCYTES # BLD AUTO: 0.92 10*3/MM3 (ref 0.2–1.2)
MONOCYTES # BLD AUTO: 0.92 10*3/MM3 (ref 0.2–1.2)
MONOCYTES # BLD AUTO: 0.93 10*3/MM3 (ref 0.2–1.2)
MONOCYTES # BLD AUTO: 0.93 10*3/MM3 (ref 0.2–1.2)
MONOCYTES # BLD AUTO: 0.95 10*3/MM3 (ref 0.2–1.2)
MONOCYTES # BLD AUTO: 0.99 10*3/MM3 (ref 0–1)
MONOCYTES # BLD AUTO: 1.02 10*3/MM3 (ref 0.2–1.2)
MONOCYTES # BLD AUTO: 1.14 10*3/MM3 (ref 0–1)
MONOCYTES # BLD AUTO: 1.2 10*3/MM3 (ref 0.2–1.2)
MONOCYTES # BLD AUTO: 1.2 10*3/MM3 (ref 0.2–1.2)
MONOCYTES # BLD AUTO: 1.23 10*3/MM3 (ref 0.2–1.2)
MONOCYTES # BLD AUTO: 1.24 10*3/MM3 (ref 0.2–1.2)
MONOCYTES # BLD AUTO: 1.44 10*3/MM3 (ref 0.2–1.2)
MONOCYTES # BLD AUTO: 1.62 10*3/MM3 (ref 0.2–1.2)
MONOCYTES NFR BLD AUTO: 10 % (ref 5–12)
MONOCYTES NFR BLD AUTO: 10 % (ref 5–12)
MONOCYTES NFR BLD AUTO: 10.1 % (ref 5–12)
MONOCYTES NFR BLD AUTO: 6 % (ref 5–12)
MONOCYTES NFR BLD AUTO: 6.1 % (ref 5–12)
MONOCYTES NFR BLD AUTO: 6.2 % (ref 5–12)
MONOCYTES NFR BLD AUTO: 6.2 % (ref 5–12)
MONOCYTES NFR BLD AUTO: 6.5 % (ref 5–12)
MONOCYTES NFR BLD AUTO: 6.6 % (ref 5–12)
MONOCYTES NFR BLD AUTO: 7 % (ref 3–8)
MONOCYTES NFR BLD AUTO: 7.3 % (ref 5–12)
MONOCYTES NFR BLD AUTO: 7.4 % (ref 5–12)
MONOCYTES NFR BLD AUTO: 7.4 % (ref 5–12)
MONOCYTES NFR BLD AUTO: 7.7 % (ref 5–12)
MONOCYTES NFR BLD AUTO: 7.7 % (ref 5–12)
MONOCYTES NFR BLD AUTO: 7.9 % (ref 5–12)
MONOCYTES NFR BLD AUTO: 8.1 % (ref 3–8)
MONOCYTES NFR BLD AUTO: 8.3 % (ref 5–12)
MONOCYTES NFR BLD AUTO: 8.5 % (ref 5–12)
MONOCYTES NFR BLD AUTO: 8.7 % (ref 5–12)
MONOCYTES NFR BLD AUTO: 8.8 % (ref 5–12)
MONOCYTES NFR BLD AUTO: 9.1 % (ref 5–12)
MONOCYTES NFR BLD AUTO: 9.2 % (ref 5–12)
MONOCYTES NFR BLD AUTO: 9.4 % (ref 5–12)
MONOCYTES NFR BLD AUTO: 9.7 % (ref 5–12)
MUCOUS THREADS URNS QL MICRO: ABNORMAL /HPF
NEUTROPHILS # BLD AUTO: 12.42 10*3/MM3 (ref 1.9–8.1)
NEUTROPHILS # BLD AUTO: 14.41 10*3/MM3 (ref 1.5–8.3)
NEUTROPHILS # BLD AUTO: 14.76 10*3/MM3 (ref 1.9–8.1)
NEUTROPHILS # BLD AUTO: 15.57 10*3/MM3 (ref 1.9–8.1)
NEUTROPHILS # BLD AUTO: 15.81 10*3/MM3 (ref 1.9–8.1)
NEUTROPHILS # BLD AUTO: 17.63 10*3/MM3 (ref 1.9–8.1)
NEUTROPHILS # BLD AUTO: 5.84 10*3/MM3 (ref 1.9–8.1)
NEUTROPHILS # BLD AUTO: 6.36 10*3/MM3 (ref 1.9–8.1)
NEUTROPHILS # BLD AUTO: 6.77 10*3/MM3 (ref 1.9–8.1)
NEUTROPHILS # BLD AUTO: 6.93 10*3/MM3 (ref 1.9–8.1)
NEUTROPHILS # BLD AUTO: 7.05 10*3/MM3 (ref 1.9–8.1)
NEUTROPHILS # BLD AUTO: 7.4 10*3/MM3 (ref 1.9–8.1)
NEUTROPHILS # BLD AUTO: 7.42 10*3/MM3 (ref 1.9–8.1)
NEUTROPHILS # BLD AUTO: 8.02 10*3/MM3 (ref 1.9–8.1)
NEUTROPHILS # BLD AUTO: 8.26 10*3/MM3 (ref 1.9–8.1)
NEUTROPHILS # BLD AUTO: 8.34 10*3/MM3 (ref 1.9–8.1)
NEUTROPHILS # BLD AUTO: 8.36 10*3/MM3 (ref 1.9–8.1)
NEUTROPHILS # BLD AUTO: 8.42 10*3/MM3 (ref 1.9–8.1)
NEUTROPHILS # BLD AUTO: 8.61 10*3/MM3 (ref 1.9–8.1)
NEUTROPHILS # BLD AUTO: 9.06 10*3/MM3 (ref 1.9–8.1)
NEUTROPHILS # BLD AUTO: 9.18 10*3/MM3 (ref 1.9–8.1)
NEUTROPHILS # BLD AUTO: 9.42 10*3/MM3 (ref 1.9–8.1)
NEUTROPHILS # BLD AUTO: 9.71 10*3/MM3 (ref 1.5–8.3)
NEUTROPHILS # BLD AUTO: 9.9 10*3/MM3 (ref 1.9–8.1)
NEUTROPHILS # BLD AUTO: 9.99 10*3/MM3 (ref 1.9–8.1)
NEUTROPHILS NFR BLD AUTO: 68.5 % (ref 42.7–76)
NEUTROPHILS NFR BLD AUTO: 68.7 % (ref 42.7–76)
NEUTROPHILS NFR BLD AUTO: 69.4 % (ref 42.7–76)
NEUTROPHILS NFR BLD AUTO: 71 % (ref 42.7–76)
NEUTROPHILS NFR BLD AUTO: 71.8 % (ref 42.7–76)
NEUTROPHILS NFR BLD AUTO: 71.9 % (ref 42.7–76)
NEUTROPHILS NFR BLD AUTO: 72.3 % (ref 42.7–76)
NEUTROPHILS NFR BLD AUTO: 74 % (ref 42.7–76)
NEUTROPHILS NFR BLD AUTO: 74 % (ref 42.7–76)
NEUTROPHILS NFR BLD AUTO: 74.1 % (ref 42.7–76)
NEUTROPHILS NFR BLD AUTO: 74.2 % (ref 42.7–76)
NEUTROPHILS NFR BLD AUTO: 75.6 % (ref 42.7–76)
NEUTROPHILS NFR BLD AUTO: 75.8 % (ref 42.7–76)
NEUTROPHILS NFR BLD AUTO: 75.9 % (ref 42.7–76)
NEUTROPHILS NFR BLD AUTO: 76.7 % (ref 42.7–76)
NEUTROPHILS NFR BLD AUTO: 77.3 % (ref 42.7–76)
NEUTROPHILS NFR BLD AUTO: 78.1 % (ref 42.7–76)
NEUTROPHILS NFR BLD AUTO: 78.4 % (ref 42.7–76)
NEUTROPHILS NFR BLD AUTO: 79.6 % (ref 45–70)
NEUTROPHILS NFR BLD AUTO: 84.3 % (ref 42.7–76)
NEUTROPHILS NFR BLD AUTO: 84.5 % (ref 42.7–76)
NEUTROPHILS NFR BLD AUTO: 84.7 % (ref 42.7–76)
NEUTROPHILS NFR BLD AUTO: 85.5 % (ref 42.7–76)
NEUTROPHILS NFR BLD AUTO: 88.2 % (ref 45–70)
NEUTROPHILS NFR BLD AUTO: 90.6 % (ref 42.7–76)
NITRITE UR QL STRIP: NEGATIVE
NRBC BLD MANUAL-RTO: 0 /100 WBC (ref 0–0)
NT-PROBNP SERPL-MCNC: 647.1 PG/ML (ref 0–900)
PATH REPORT.ADDENDUM SPEC: NORMAL
PATH REPORT.FINAL DX SPEC: NORMAL
PATH REPORT.GROSS SPEC: NORMAL
PCO2 BLDA: 27.2 MM HG (ref 35–45)
PCO2 BLDA: 33.4 MM HG (ref 35–45)
PH BLDA: 7.49 PH UNITS (ref 7.35–7.45)
PH BLDA: 7.54 PH UNITS (ref 7.35–7.45)
PH UR STRIP.AUTO: 6 [PH] (ref 4.5–8)
PLATELET # BLD AUTO: 203 10*3/MM3 (ref 140–500)
PLATELET # BLD AUTO: 216 10*3/MM3 (ref 140–500)
PLATELET # BLD AUTO: 231 10*3/MM3 (ref 140–500)
PLATELET # BLD AUTO: 264 10*3/MM3 (ref 140–500)
PLATELET # BLD AUTO: 269 10*3/MM3 (ref 140–500)
PLATELET # BLD AUTO: 279 10*3/MM3 (ref 140–500)
PLATELET # BLD AUTO: 301 10*3/MM3 (ref 140–500)
PLATELET # BLD AUTO: 304 10*3/MM3 (ref 140–500)
PLATELET # BLD AUTO: 320 10*3/MM3 (ref 140–500)
PLATELET # BLD AUTO: 355 10*3/MM3 (ref 140–500)
PLATELET # BLD AUTO: 397 10*3/MM3 (ref 140–500)
PLATELET # BLD AUTO: 440 10*3/MM3 (ref 140–500)
PLATELET # BLD AUTO: 446 10*3/MM3 (ref 140–500)
PLATELET # BLD AUTO: 449 10*3/MM3 (ref 140–500)
PLATELET # BLD AUTO: 458 10*3/MM3 (ref 140–500)
PLATELET # BLD AUTO: 485 10*3/MM3 (ref 140–500)
PLATELET # BLD AUTO: 499 10*3/MM3 (ref 140–500)
PLATELET # BLD AUTO: 525 10*3/MM3 (ref 140–500)
PLATELET # BLD AUTO: 538 10*3/MM3 (ref 140–500)
PLATELET # BLD AUTO: 552 10*3/MM3 (ref 140–500)
PLATELET # BLD AUTO: 560 10*3/MM3 (ref 140–500)
PLATELET # BLD AUTO: 566 10*3/MM3 (ref 140–500)
PLATELET # BLD AUTO: 589 10*3/MM3 (ref 140–500)
PLATELET # BLD AUTO: 592 10*3/MM3 (ref 140–500)
PLATELET # BLD AUTO: 612 10*3/MM3 (ref 140–500)
PLATELET # BLD AUTO: 613 10*3/MM3 (ref 140–500)
PLATELET # BLD AUTO: 640 10*3/MM3 (ref 140–500)
PMV BLD AUTO: 10 FL (ref 6–12)
PMV BLD AUTO: 10.1 FL (ref 6–12)
PMV BLD AUTO: 10.7 FL (ref 6–12)
PMV BLD AUTO: 9.2 FL (ref 6–12)
PMV BLD AUTO: 9.3 FL (ref 6–12)
PMV BLD AUTO: 9.4 FL (ref 6–12)
PMV BLD AUTO: 9.4 FL (ref 7.4–10.4)
PMV BLD AUTO: 9.5 FL (ref 6–12)
PMV BLD AUTO: 9.6 FL (ref 6–12)
PMV BLD AUTO: 9.7 FL (ref 6–12)
PMV BLD AUTO: 9.8 FL (ref 6–12)
PMV BLD AUTO: 9.8 FL (ref 7.4–10.4)
PMV BLD AUTO: 9.9 FL (ref 6–12)
PO2 BLDA: 71.8 MM HG (ref 80–100)
PO2 BLDA: 93.4 MM HG (ref 80–100)
POTASSIUM BLD-SCNC: 2.9 MMOL/L (ref 3.5–5.2)
POTASSIUM BLD-SCNC: 3.5 MMOL/L (ref 3.5–5.2)
POTASSIUM BLD-SCNC: 3.9 MMOL/L (ref 3.5–5.2)
POTASSIUM BLD-SCNC: 4 MMOL/L (ref 3.5–5.2)
POTASSIUM BLD-SCNC: 4.1 MMOL/L (ref 3.5–5.2)
POTASSIUM BLD-SCNC: 4.2 MMOL/L (ref 3.5–5.2)
POTASSIUM BLD-SCNC: 4.2 MMOL/L (ref 3.5–5.2)
POTASSIUM BLD-SCNC: 4.3 MMOL/L (ref 3.5–5.2)
PROT SERPL-MCNC: 6.1 G/DL (ref 6–8.5)
PROT SERPL-MCNC: 6.2 G/DL (ref 6–8.5)
PROT SERPL-MCNC: 6.6 G/DL (ref 6–8.5)
PROT SERPL-MCNC: 6.7 G/DL (ref 6–8.5)
PROT SERPL-MCNC: 6.7 G/DL (ref 6–8.5)
PROT SERPL-MCNC: 6.9 G/DL (ref 6–8.5)
PROT SERPL-MCNC: 6.9 G/DL (ref 6–8.5)
PROT SERPL-MCNC: 7 G/DL (ref 6–8.5)
PROT UR QL STRIP: ABNORMAL
PROTHROMBIN TIME: 13.8 SECONDS (ref 11.7–14.2)
PROTHROMBIN TIME: 14 SECONDS (ref 11.7–14.2)
PROTHROMBIN TIME: 14.1 SECONDS (ref 11.7–14.2)
PROTHROMBIN TIME: 14.2 SECONDS (ref 11.7–14.2)
PROTHROMBIN TIME: 14.3 SECONDS (ref 11.7–14.2)
PROTHROMBIN TIME: 14.3 SECONDS (ref 11.7–14.2)
PROTHROMBIN TIME: 14.6 SECONDS (ref 11.7–14.2)
PROTHROMBIN TIME: 15.5 SECONDS (ref 11.7–14.2)
RBC # BLD AUTO: 2.97 10*6/MM3 (ref 3.9–5.2)
RBC # BLD AUTO: 3.12 10*6/MM3 (ref 3.9–5.2)
RBC # BLD AUTO: 3.21 10*6/MM3 (ref 3.9–5.2)
RBC # BLD AUTO: 3.22 10*6/MM3 (ref 3.9–5.2)
RBC # BLD AUTO: 3.25 10*6/MM3 (ref 3.9–5.2)
RBC # BLD AUTO: 3.32 10*6/MM3 (ref 3.9–5.2)
RBC # BLD AUTO: 3.32 10*6/MM3 (ref 3.9–5.2)
RBC # BLD AUTO: 3.34 10*6/MM3 (ref 3.9–5.2)
RBC # BLD AUTO: 3.34 10*6/MM3 (ref 3.9–5.2)
RBC # BLD AUTO: 3.35 10*6/MM3 (ref 3.9–5.2)
RBC # BLD AUTO: 3.36 10*6/MM3 (ref 3.9–5.2)
RBC # BLD AUTO: 3.41 10*6/MM3 (ref 3.9–5.2)
RBC # BLD AUTO: 3.42 10*6/MM3 (ref 3.9–5.2)
RBC # BLD AUTO: 3.42 10*6/MM3 (ref 3.9–5.2)
RBC # BLD AUTO: 3.51 10*6/MM3 (ref 3.9–5.2)
RBC # BLD AUTO: 3.52 10*6/MM3 (ref 3.9–5.2)
RBC # BLD AUTO: 3.55 10*6/MM3 (ref 3.9–5.2)
RBC # BLD AUTO: 3.58 10*6/MM3 (ref 3.9–5.2)
RBC # BLD AUTO: 3.61 10*6/MM3 (ref 3.9–5.2)
RBC # BLD AUTO: 3.63 10*6/MM3 (ref 4.2–5.4)
RBC # BLD AUTO: 3.71 10*6/MM3 (ref 4.2–5.4)
RBC # BLD AUTO: 3.72 10*6/MM3 (ref 3.9–5.2)
RBC # BLD AUTO: 3.76 10*6/MM3 (ref 3.9–5.2)
RBC # BLD AUTO: 3.8 10*6/MM3 (ref 3.9–5.2)
RBC # BLD AUTO: 3.89 10*6/MM3 (ref 3.9–5.2)
RBC # BLD AUTO: 3.98 10*6/MM3 (ref 3.9–5.2)
RBC # BLD AUTO: 3.99 10*6/MM3 (ref 3.9–5.2)
RBC # UR: ABNORMAL /HPF
REF LAB TEST METHOD: ABNORMAL
RETICS/RBC NFR AUTO: 1.5 % (ref 0.5–1.5)
RH BLD: NEGATIVE
SAO2 % BLDCOA: 95.8 % (ref 92–99)
SAO2 % BLDCOA: 98.2 % (ref 92–99)
SODIUM BLD-SCNC: 134 MMOL/L (ref 136–145)
SODIUM BLD-SCNC: 135 MMOL/L (ref 136–145)
SODIUM BLD-SCNC: 136 MMOL/L (ref 136–145)
SODIUM BLD-SCNC: 138 MMOL/L (ref 136–145)
SODIUM BLD-SCNC: 140 MMOL/L (ref 136–145)
SODIUM BLD-SCNC: 143 MMOL/L (ref 136–145)
SP GR UR STRIP: 1.02 (ref 1–1.03)
SQUAMOUS #/AREA URNS HPF: ABNORMAL /HPF
T4 FREE SERPL-MCNC: 1.1 NG/DL (ref 0.93–1.7)
TIBC SERPL-MCNC: 157 MCG/DL
TIBC SERPL-MCNC: 232 MCG/DL
TOTAL RATE: 30 BREATHS/MINUTE
TOTAL RATE: 34 BREATHS/MINUTE
TRANSFERRIN SERPL-MCNC: 156 MG/DL (ref 200–360)
TROPONIN T SERPL-MCNC: <0.01 NG/ML (ref 0–0.03)
TSH SERPL DL<=0.05 MIU/L-ACNC: 2.18 MIU/ML (ref 0.27–4.2)
UIBC SERPL-MCNC: 144 MCG/DL (ref 112–346)
UROBILINOGEN UR QL STRIP: ABNORMAL
VANCOMYCIN TROUGH SERPL-MCNC: 20.4 MCG/ML (ref 5–20)
VIT B12 BLD-MCNC: 321 PG/ML (ref 211–946)
VIT B12 BLD-MCNC: 716 PG/ML (ref 232–1245)
WBC NRBC COR # BLD: 10 10*3/MM3 (ref 4.5–10.7)
WBC NRBC COR # BLD: 10.08 10*3/MM3 (ref 4.5–10.7)
WBC NRBC COR # BLD: 10.97 10*3/MM3 (ref 4.5–10.7)
WBC NRBC COR # BLD: 10.97 10*3/MM3 (ref 4.5–10.7)
WBC NRBC COR # BLD: 11.17 10*3/MM3 (ref 4.5–10.7)
WBC NRBC COR # BLD: 11.17 10*3/MM3 (ref 4.5–10.7)
WBC NRBC COR # BLD: 11.56 10*3/MM3 (ref 4.5–10.7)
WBC NRBC COR # BLD: 11.59 10*3/MM3 (ref 4.5–10.7)
WBC NRBC COR # BLD: 11.64 10*3/MM3 (ref 4.5–10.7)
WBC NRBC COR # BLD: 12.22 10*3/MM3 (ref 4.8–10.8)
WBC NRBC COR # BLD: 12.39 10*3/MM3 (ref 4.5–10.7)
WBC NRBC COR # BLD: 12.41 10*3/MM3 (ref 4.5–10.7)
WBC NRBC COR # BLD: 12.73 10*3/MM3 (ref 4.5–10.7)
WBC NRBC COR # BLD: 12.8 10*3/MM3 (ref 4.5–10.7)
WBC NRBC COR # BLD: 13.69 10*3/MM3 (ref 4.5–10.7)
WBC NRBC COR # BLD: 16.34 10*3/MM3 (ref 4.8–10.8)
WBC NRBC COR # BLD: 17.28 10*3/MM3 (ref 4.5–10.7)
WBC NRBC COR # BLD: 18.42 10*3/MM3 (ref 4.5–10.7)
WBC NRBC COR # BLD: 18.64 10*3/MM3 (ref 4.5–10.7)
WBC NRBC COR # BLD: 20.94 10*3/MM3 (ref 4.5–10.7)
WBC NRBC COR # BLD: 8.41 10*3/MM3 (ref 4.5–10.7)
WBC NRBC COR # BLD: 9.27 10*3/MM3 (ref 4.5–10.7)
WBC NRBC COR # BLD: 9.42 10*3/MM3 (ref 4.5–10.7)
WBC NRBC COR # BLD: 9.45 10*3/MM3 (ref 4.5–10.7)
WBC NRBC COR # BLD: 9.78 10*3/MM3 (ref 4.5–10.7)
WBC NRBC COR # BLD: 9.88 10*3/MM3 (ref 4.5–10.7)
WBC NRBC COR # BLD: 9.93 10*3/MM3 (ref 4.5–10.7)
WBC UR QL AUTO: ABNORMAL /HPF

## 2018-01-01 PROCEDURE — 99232 SBSQ HOSP IP/OBS MODERATE 35: CPT | Performed by: INTERNAL MEDICINE

## 2018-01-01 PROCEDURE — 25010000002 ENOXAPARIN PER 10 MG: Performed by: INTERNAL MEDICINE

## 2018-01-01 PROCEDURE — 25010000002 ENOXAPARIN PER 10 MG: Performed by: PHYSICAL MEDICINE & REHABILITATION

## 2018-01-01 PROCEDURE — 97161 PT EVAL LOW COMPLEX 20 MIN: CPT

## 2018-01-01 PROCEDURE — 97110 THERAPEUTIC EXERCISES: CPT

## 2018-01-01 PROCEDURE — 97112 NEUROMUSCULAR REEDUCATION: CPT

## 2018-01-01 PROCEDURE — 70496 CT ANGIOGRAPHY HEAD: CPT

## 2018-01-01 PROCEDURE — 97535 SELF CARE MNGMENT TRAINING: CPT

## 2018-01-01 PROCEDURE — 99233 SBSQ HOSP IP/OBS HIGH 50: CPT | Performed by: NURSE PRACTITIONER

## 2018-01-01 PROCEDURE — 86900 BLOOD TYPING SEROLOGIC ABO: CPT | Performed by: INTERNAL MEDICINE

## 2018-01-01 PROCEDURE — 94799 UNLISTED PULMONARY SVC/PX: CPT

## 2018-01-01 PROCEDURE — 85025 COMPLETE CBC W/AUTO DIFF WBC: CPT | Performed by: INTERNAL MEDICINE

## 2018-01-01 PROCEDURE — 80053 COMPREHEN METABOLIC PANEL: CPT

## 2018-01-01 PROCEDURE — 71045 X-RAY EXAM CHEST 1 VIEW: CPT

## 2018-01-01 PROCEDURE — 94760 N-INVAS EAR/PLS OXIMETRY 1: CPT

## 2018-01-01 PROCEDURE — 85730 THROMBOPLASTIN TIME PARTIAL: CPT | Performed by: INTERNAL MEDICINE

## 2018-01-01 PROCEDURE — 77417 THER RADIOLOGY PORT IMAGE(S): CPT | Performed by: RADIOLOGY

## 2018-01-01 PROCEDURE — 92507 TX SP LANG VOICE COMM INDIV: CPT

## 2018-01-01 PROCEDURE — 25010000002 PIPERACILLIN SOD-TAZOBACTAM PER 1 G: Performed by: EMERGENCY MEDICINE

## 2018-01-01 PROCEDURE — 92610 EVALUATE SWALLOWING FUNCTION: CPT

## 2018-01-01 PROCEDURE — 25010000002 VANCOMYCIN PER 500 MG: Performed by: INTERNAL MEDICINE

## 2018-01-01 PROCEDURE — 80053 COMPREHEN METABOLIC PANEL: CPT | Performed by: PHYSICAL MEDICINE & REHABILITATION

## 2018-01-01 PROCEDURE — 25010000002 HEPARIN (PORCINE) PER 1000 UNITS: Performed by: INTERNAL MEDICINE

## 2018-01-01 PROCEDURE — 25010000002 LORAZEPAM PER 2 MG

## 2018-01-01 PROCEDURE — 77412 RADIATION TX DELIVERY LVL 3: CPT | Performed by: RADIOLOGY

## 2018-01-01 PROCEDURE — 99215 OFFICE O/P EST HI 40 MIN: CPT | Performed by: INTERNAL MEDICINE

## 2018-01-01 PROCEDURE — 96105 ASSESSMENT OF APHASIA: CPT

## 2018-01-01 PROCEDURE — 85025 COMPLETE CBC W/AUTO DIFF WBC: CPT | Performed by: PHYSICAL MEDICINE & REHABILITATION

## 2018-01-01 PROCEDURE — 0 FLUDEOXYGLUCOSE F18 SOLUTION: Performed by: PHYSICAL MEDICINE & REHABILITATION

## 2018-01-01 PROCEDURE — 70553 MRI BRAIN STEM W/O & W/DYE: CPT

## 2018-01-01 PROCEDURE — 97535 SELF CARE MNGMENT TRAINING: CPT | Performed by: OCCUPATIONAL THERAPIST

## 2018-01-01 PROCEDURE — 82607 VITAMIN B-12: CPT | Performed by: INTERNAL MEDICINE

## 2018-01-01 PROCEDURE — 82962 GLUCOSE BLOOD TEST: CPT

## 2018-01-01 PROCEDURE — 85610 PROTHROMBIN TIME: CPT | Performed by: PHYSICAL MEDICINE & REHABILITATION

## 2018-01-01 PROCEDURE — 77427 RADIATION TX MANAGEMENT X5: CPT | Performed by: RADIOLOGY

## 2018-01-01 PROCEDURE — 85730 THROMBOPLASTIN TIME PARTIAL: CPT | Performed by: PHYSICAL MEDICINE & REHABILITATION

## 2018-01-01 PROCEDURE — 25010000002 PIPERACILLIN SOD-TAZOBACTAM PER 1 G: Performed by: INTERNAL MEDICINE

## 2018-01-01 PROCEDURE — 77263 THER RADIOLOGY TX PLNG CPLX: CPT | Performed by: RADIOLOGY

## 2018-01-01 PROCEDURE — 74177 CT ABD & PELVIS W/CONTRAST: CPT

## 2018-01-01 PROCEDURE — G8996 SWALLOW CURRENT STATUS: HCPCS

## 2018-01-01 PROCEDURE — 25010000002 PHENYLEPHRINE 10 MG/ML SOLUTION: Performed by: INTERNAL MEDICINE

## 2018-01-01 PROCEDURE — 99231 SBSQ HOSP IP/OBS SF/LOW 25: CPT | Performed by: INTERNAL MEDICINE

## 2018-01-01 PROCEDURE — 77290 THER RAD SIMULAJ FIELD CPLX: CPT | Performed by: RADIOLOGY

## 2018-01-01 PROCEDURE — 82803 BLOOD GASES ANY COMBINATION: CPT

## 2018-01-01 PROCEDURE — 71250 CT THORAX DX C-: CPT

## 2018-01-01 PROCEDURE — 70498 CT ANGIOGRAPHY NECK: CPT

## 2018-01-01 PROCEDURE — 97110 THERAPEUTIC EXERCISES: CPT | Performed by: PHYSICAL THERAPIST

## 2018-01-01 PROCEDURE — 83880 ASSAY OF NATRIURETIC PEPTIDE: CPT | Performed by: EMERGENCY MEDICINE

## 2018-01-01 PROCEDURE — 85018 HEMOGLOBIN: CPT | Performed by: INTERNAL MEDICINE

## 2018-01-01 PROCEDURE — 93970 EXTREMITY STUDY: CPT

## 2018-01-01 PROCEDURE — 77295 3-D RADIOTHERAPY PLAN: CPT | Performed by: RADIOLOGY

## 2018-01-01 PROCEDURE — 87040 BLOOD CULTURE FOR BACTERIA: CPT

## 2018-01-01 PROCEDURE — 77334 RADIATION TREATMENT AID(S): CPT | Performed by: RADIOLOGY

## 2018-01-01 PROCEDURE — 85610 PROTHROMBIN TIME: CPT | Performed by: EMERGENCY MEDICINE

## 2018-01-01 PROCEDURE — S0353 CANCER TREATMENTPLAN INITIAL: HCPCS | Performed by: INTERNAL MEDICINE

## 2018-01-01 PROCEDURE — 83605 ASSAY OF LACTIC ACID: CPT | Performed by: EMERGENCY MEDICINE

## 2018-01-01 PROCEDURE — 0 IOPAMIDOL PER 1 ML: Performed by: PHYSICAL MEDICINE & REHABILITATION

## 2018-01-01 PROCEDURE — 86850 RBC ANTIBODY SCREEN: CPT | Performed by: INTERNAL MEDICINE

## 2018-01-01 PROCEDURE — 86900 BLOOD TYPING SEROLOGIC ABO: CPT

## 2018-01-01 PROCEDURE — 80048 BASIC METABOLIC PNL TOTAL CA: CPT | Performed by: INTERNAL MEDICINE

## 2018-01-01 PROCEDURE — 85025 COMPLETE CBC W/AUTO DIFF WBC: CPT | Performed by: EMERGENCY MEDICINE

## 2018-01-01 PROCEDURE — 74230 X-RAY XM SWLNG FUNCJ C+: CPT

## 2018-01-01 PROCEDURE — 0BBL3ZX EXCISION OF LEFT LUNG, PERCUTANEOUS APPROACH, DIAGNOSTIC: ICD-10-PCS | Performed by: RADIOLOGY

## 2018-01-01 PROCEDURE — 82728 ASSAY OF FERRITIN: CPT | Performed by: INTERNAL MEDICINE

## 2018-01-01 PROCEDURE — 85027 COMPLETE CBC AUTOMATED: CPT | Performed by: INTERNAL MEDICINE

## 2018-01-01 PROCEDURE — 80053 COMPREHEN METABOLIC PANEL: CPT | Performed by: EMERGENCY MEDICINE

## 2018-01-01 PROCEDURE — 85610 PROTHROMBIN TIME: CPT | Performed by: INTERNAL MEDICINE

## 2018-01-01 PROCEDURE — 36600 WITHDRAWAL OF ARTERIAL BLOOD: CPT

## 2018-01-01 PROCEDURE — 93010 ELECTROCARDIOGRAM REPORT: CPT | Performed by: INTERNAL MEDICINE

## 2018-01-01 PROCEDURE — 92611 MOTION FLUOROSCOPY/SWALLOW: CPT

## 2018-01-01 PROCEDURE — 99231 SBSQ HOSP IP/OBS SF/LOW 25: CPT | Performed by: NURSE PRACTITIONER

## 2018-01-01 PROCEDURE — 25010000002 MORPHINE PER 10 MG: Performed by: INTERNAL MEDICINE

## 2018-01-01 PROCEDURE — 25010000002 VANCOMYCIN 10 G RECONSTITUTED SOLUTION: Performed by: INTERNAL MEDICINE

## 2018-01-01 PROCEDURE — 85014 HEMATOCRIT: CPT | Performed by: INTERNAL MEDICINE

## 2018-01-01 PROCEDURE — 71260 CT THORAX DX C+: CPT

## 2018-01-01 PROCEDURE — 85025 COMPLETE CBC W/AUTO DIFF WBC: CPT

## 2018-01-01 PROCEDURE — 25010000002 VANCOMYCIN 10 G RECONSTITUTED SOLUTION: Performed by: EMERGENCY MEDICINE

## 2018-01-01 PROCEDURE — 83735 ASSAY OF MAGNESIUM: CPT | Performed by: INTERNAL MEDICINE

## 2018-01-01 PROCEDURE — 99252 IP/OBS CONSLTJ NEW/EST SF 35: CPT | Performed by: RADIOLOGY

## 2018-01-01 PROCEDURE — 99255 IP/OBS CONSLTJ NEW/EST HI 80: CPT | Performed by: INTERNAL MEDICINE

## 2018-01-01 PROCEDURE — 83550 IRON BINDING TEST: CPT | Performed by: INTERNAL MEDICINE

## 2018-01-01 PROCEDURE — 77300 RADIATION THERAPY DOSE PLAN: CPT | Performed by: RADIOLOGY

## 2018-01-01 PROCEDURE — 80053 COMPREHEN METABOLIC PANEL: CPT | Performed by: INTERNAL MEDICINE

## 2018-01-01 PROCEDURE — 25010000002 PROPOFOL 10 MG/ML EMULSION: Performed by: ANESTHESIOLOGY

## 2018-01-01 PROCEDURE — 86923 COMPATIBILITY TEST ELECTRIC: CPT

## 2018-01-01 PROCEDURE — G8997 SWALLOW GOAL STATUS: HCPCS

## 2018-01-01 PROCEDURE — 93005 ELECTROCARDIOGRAM TRACING: CPT

## 2018-01-01 PROCEDURE — C1751 CATH, INF, PER/CENT/MIDLINE: HCPCS

## 2018-01-01 PROCEDURE — 99232 SBSQ HOSP IP/OBS MODERATE 35: CPT | Performed by: THORACIC SURGERY (CARDIOTHORACIC VASCULAR SURGERY)

## 2018-01-01 PROCEDURE — 25010000002 LORAZEPAM PER 2 MG: Performed by: INTERNAL MEDICINE

## 2018-01-01 PROCEDURE — 86901 BLOOD TYPING SEROLOGIC RH(D): CPT | Performed by: INTERNAL MEDICINE

## 2018-01-01 PROCEDURE — 43246 EGD PLACE GASTROSTOMY TUBE: CPT | Performed by: INTERNAL MEDICINE

## 2018-01-01 PROCEDURE — 87040 BLOOD CULTURE FOR BACTERIA: CPT | Performed by: INTERNAL MEDICINE

## 2018-01-01 PROCEDURE — 94762 N-INVAS EAR/PLS OXIMTRY CONT: CPT

## 2018-01-01 PROCEDURE — 77012 CT SCAN FOR NEEDLE BIOPSY: CPT

## 2018-01-01 PROCEDURE — 87040 BLOOD CULTURE FOR BACTERIA: CPT | Performed by: EMERGENCY MEDICINE

## 2018-01-01 PROCEDURE — 99285 EMERGENCY DEPT VISIT HI MDM: CPT

## 2018-01-01 PROCEDURE — 71046 X-RAY EXAM CHEST 2 VIEWS: CPT

## 2018-01-01 PROCEDURE — 93005 ELECTROCARDIOGRAM TRACING: CPT | Performed by: INTERNAL MEDICINE

## 2018-01-01 PROCEDURE — 99253 IP/OBS CNSLTJ NEW/EST LOW 45: CPT | Performed by: INTERNAL MEDICINE

## 2018-01-01 PROCEDURE — 88341 IMHCHEM/IMCYTCHM EA ADD ANTB: CPT | Performed by: PHYSICAL MEDICINE & REHABILITATION

## 2018-01-01 PROCEDURE — 85610 PROTHROMBIN TIME: CPT | Performed by: NURSE PRACTITIONER

## 2018-01-01 PROCEDURE — 0 DIATRIZOATE MEGLUMINE & SODIUM PER 1 ML: Performed by: INTERNAL MEDICINE

## 2018-01-01 PROCEDURE — 0042T HC CT CEREBRAL PERFUSION W/WO CONTRAST: CPT

## 2018-01-01 PROCEDURE — 94668 MNPJ CHEST WALL SBSQ: CPT

## 2018-01-01 PROCEDURE — 81001 URINALYSIS AUTO W/SCOPE: CPT | Performed by: INTERNAL MEDICINE

## 2018-01-01 PROCEDURE — 88342 IMHCHEM/IMCYTCHM 1ST ANTB: CPT | Performed by: PHYSICAL MEDICINE & REHABILITATION

## 2018-01-01 PROCEDURE — 0 GADOBENATE DIMEGLUMINE 529 MG/ML SOLUTION: Performed by: INTERNAL MEDICINE

## 2018-01-01 PROCEDURE — 77336 RADIATION PHYSICS CONSULT: CPT | Performed by: RADIOLOGY

## 2018-01-01 PROCEDURE — 82746 ASSAY OF FOLIC ACID SERUM: CPT | Performed by: INTERNAL MEDICINE

## 2018-01-01 PROCEDURE — 84466 ASSAY OF TRANSFERRIN: CPT | Performed by: INTERNAL MEDICINE

## 2018-01-01 PROCEDURE — 84443 ASSAY THYROID STIM HORMONE: CPT

## 2018-01-01 PROCEDURE — 84439 ASSAY OF FREE THYROXINE: CPT

## 2018-01-01 PROCEDURE — 80202 ASSAY OF VANCOMYCIN: CPT | Performed by: INTERNAL MEDICINE

## 2018-01-01 PROCEDURE — 73552 X-RAY EXAM OF FEMUR 2/>: CPT

## 2018-01-01 PROCEDURE — 88305 TISSUE EXAM BY PATHOLOGIST: CPT | Performed by: PHYSICAL MEDICINE & REHABILITATION

## 2018-01-01 PROCEDURE — 83540 ASSAY OF IRON: CPT | Performed by: INTERNAL MEDICINE

## 2018-01-01 PROCEDURE — 25010000003 CEFAZOLIN 1 GM/50ML SOLUTION: Performed by: INTERNAL MEDICINE

## 2018-01-01 PROCEDURE — 0 GADOBENATE DIMEGLUMINE 529 MG/ML SOLUTION: Performed by: PHYSICAL MEDICINE & REHABILITATION

## 2018-01-01 PROCEDURE — 36415 COLL VENOUS BLD VENIPUNCTURE: CPT | Performed by: INTERNAL MEDICINE

## 2018-01-01 PROCEDURE — 78815 PET IMAGE W/CT SKULL-THIGH: CPT

## 2018-01-01 PROCEDURE — 92611 MOTION FLUOROSCOPY/SWALLOW: CPT | Performed by: SPEECH-LANGUAGE PATHOLOGIST

## 2018-01-01 PROCEDURE — 25010000002 HYDROMORPHONE PER 4 MG: Performed by: INTERNAL MEDICINE

## 2018-01-01 PROCEDURE — 82747 ASSAY OF FOLIC ACID RBC: CPT | Performed by: INTERNAL MEDICINE

## 2018-01-01 PROCEDURE — 94640 AIRWAY INHALATION TREATMENT: CPT

## 2018-01-01 PROCEDURE — 99255 IP/OBS CONSLTJ NEW/EST HI 80: CPT | Performed by: RADIOLOGY

## 2018-01-01 PROCEDURE — A9552 F18 FDG: HCPCS | Performed by: PHYSICAL MEDICINE & REHABILITATION

## 2018-01-01 PROCEDURE — 36430 TRANSFUSION BLD/BLD COMPNT: CPT

## 2018-01-01 PROCEDURE — 99232 SBSQ HOSP IP/OBS MODERATE 35: CPT | Performed by: NURSE PRACTITIONER

## 2018-01-01 PROCEDURE — 0 IOPAMIDOL 61 % SOLUTION: Performed by: INTERNAL MEDICINE

## 2018-01-01 PROCEDURE — 97167 OT EVAL HIGH COMPLEX 60 MIN: CPT | Performed by: OCCUPATIONAL THERAPIST

## 2018-01-01 PROCEDURE — 85046 RETICYTE/HGB CONCENTRATE: CPT | Performed by: INTERNAL MEDICINE

## 2018-01-01 PROCEDURE — 86901 BLOOD TYPING SEROLOGIC RH(D): CPT

## 2018-01-01 PROCEDURE — 94667 MNPJ CHEST WALL 1ST: CPT

## 2018-01-01 PROCEDURE — 96413 CHEMO IV INFUSION 1 HR: CPT | Performed by: NURSE PRACTITIONER

## 2018-01-01 PROCEDURE — 0W9B30Z DRAINAGE OF LEFT PLEURAL CAVITY WITH DRAINAGE DEVICE, PERCUTANEOUS APPROACH: ICD-10-PCS | Performed by: THORACIC SURGERY (CARDIOTHORACIC VASCULAR SURGERY)

## 2018-01-01 PROCEDURE — 99024 POSTOP FOLLOW-UP VISIT: CPT | Performed by: THORACIC SURGERY (CARDIOTHORACIC VASCULAR SURGERY)

## 2018-01-01 PROCEDURE — 99215 OFFICE O/P EST HI 40 MIN: CPT | Performed by: NURSE PRACTITIONER

## 2018-01-01 PROCEDURE — 32551 INSERTION OF CHEST TUBE: CPT | Performed by: THORACIC SURGERY (CARDIOTHORACIC VASCULAR SURGERY)

## 2018-01-01 PROCEDURE — 25010000002 PEMBROLIZUMAB 100 MG/4ML SOLUTION 4 ML VIAL: Performed by: INTERNAL MEDICINE

## 2018-01-01 PROCEDURE — 99233 SBSQ HOSP IP/OBS HIGH 50: CPT | Performed by: INTERNAL MEDICINE

## 2018-01-01 PROCEDURE — A9577 INJ MULTIHANCE: HCPCS | Performed by: INTERNAL MEDICINE

## 2018-01-01 PROCEDURE — 84484 ASSAY OF TROPONIN QUANT: CPT | Performed by: EMERGENCY MEDICINE

## 2018-01-01 PROCEDURE — P9016 RBC LEUKOCYTES REDUCED: HCPCS

## 2018-01-01 PROCEDURE — G8998 SWALLOW D/C STATUS: HCPCS

## 2018-01-01 PROCEDURE — 93005 ELECTROCARDIOGRAM TRACING: CPT | Performed by: EMERGENCY MEDICINE

## 2018-01-01 PROCEDURE — A9577 INJ MULTIHANCE: HCPCS | Performed by: PHYSICAL MEDICINE & REHABILITATION

## 2018-01-01 DEVICE — PERCUTANEOUS ENDOSCOPIC GASTROSTOMY KIT
Type: IMPLANTABLE DEVICE | Site: STOMACH | Status: FUNCTIONAL
Brand: ENDOVIVE SAFETY PEG KIT

## 2018-01-01 RX ORDER — FOLIC ACID 1 MG/1
1 TABLET ORAL DAILY
Status: DISCONTINUED | OUTPATIENT
Start: 2018-01-01 | End: 2018-01-01

## 2018-01-01 RX ORDER — RALOXIFENE HYDROCHLORIDE 60 MG/1
60 TABLET, FILM COATED ORAL DAILY
Status: DISCONTINUED | OUTPATIENT
Start: 2018-01-01 | End: 2018-01-01

## 2018-01-01 RX ORDER — IPRATROPIUM BROMIDE AND ALBUTEROL SULFATE 2.5; .5 MG/3ML; MG/3ML
3 SOLUTION RESPIRATORY (INHALATION)
Status: DISCONTINUED | OUTPATIENT
Start: 2018-01-01 | End: 2018-01-01

## 2018-01-01 RX ORDER — MIRTAZAPINE 15 MG/1
7.5 TABLET, FILM COATED ORAL NIGHTLY
Status: COMPLETED | OUTPATIENT
Start: 2018-01-01 | End: 2018-01-01

## 2018-01-01 RX ORDER — ATORVASTATIN CALCIUM 20 MG/1
20 TABLET, FILM COATED ORAL DAILY
Status: DISCONTINUED | OUTPATIENT
Start: 2018-01-01 | End: 2018-01-01

## 2018-01-01 RX ORDER — ACETAMINOPHEN 325 MG/1
650 TABLET ORAL EVERY 4 HOURS PRN
Status: DISCONTINUED | OUTPATIENT
Start: 2018-01-01 | End: 2018-03-28 | Stop reason: HOSPADM

## 2018-01-01 RX ORDER — MORPHINE SULFATE 20 MG/ML
10 SOLUTION ORAL
Status: DISCONTINUED | OUTPATIENT
Start: 2018-01-01 | End: 2018-03-28 | Stop reason: HOSPADM

## 2018-01-01 RX ORDER — ONDANSETRON 4 MG/1
4 TABLET, ORALLY DISINTEGRATING ORAL EVERY 6 HOURS PRN
Status: DISCONTINUED | OUTPATIENT
Start: 2018-01-01 | End: 2018-01-01 | Stop reason: HOSPADM

## 2018-01-01 RX ORDER — TRAZODONE HYDROCHLORIDE 50 MG/1
50 TABLET ORAL NIGHTLY
Status: DISCONTINUED | OUTPATIENT
Start: 2018-01-01 | End: 2018-01-01

## 2018-01-01 RX ORDER — TEMAZEPAM 7.5 MG/1
7.5 CAPSULE ORAL NIGHTLY PRN
Status: DISCONTINUED | OUTPATIENT
Start: 2018-01-01 | End: 2018-03-28 | Stop reason: HOSPADM

## 2018-01-01 RX ORDER — ASPIRIN 325 MG
325 TABLET ORAL DAILY
Status: DISCONTINUED | OUTPATIENT
Start: 2018-01-01 | End: 2018-01-01 | Stop reason: HOSPADM

## 2018-01-01 RX ORDER — DOCUSATE SODIUM 50 MG/5 ML
100 LIQUID (ML) ORAL 2 TIMES DAILY
Status: DISCONTINUED | OUTPATIENT
Start: 2018-01-01 | End: 2018-01-01

## 2018-01-01 RX ORDER — HYDROCODONE BITARTRATE AND ACETAMINOPHEN 5; 325 MG/1; MG/1
1 TABLET ORAL EVERY 6 HOURS PRN
COMMUNITY
End: 2018-01-01

## 2018-01-01 RX ORDER — SODIUM CHLORIDE 0.9 % (FLUSH) 0.9 %
1-10 SYRINGE (ML) INJECTION AS NEEDED
Status: DISCONTINUED | OUTPATIENT
Start: 2018-01-01 | End: 2018-03-28 | Stop reason: HOSPADM

## 2018-01-01 RX ORDER — HALOPERIDOL 5 MG/ML
1 INJECTION INTRAMUSCULAR EVERY 4 HOURS PRN
Status: DISCONTINUED | OUTPATIENT
Start: 2018-01-01 | End: 2018-03-28 | Stop reason: HOSPADM

## 2018-01-01 RX ORDER — PROPOFOL 10 MG/ML
VIAL (ML) INTRAVENOUS CONTINUOUS PRN
Status: DISCONTINUED | OUTPATIENT
Start: 2018-01-01 | End: 2018-01-01 | Stop reason: SURG

## 2018-01-01 RX ORDER — HYDROCODONE BITARTRATE AND ACETAMINOPHEN 7.5; 325 MG/1; MG/1
2 TABLET ORAL EVERY 4 HOURS PRN
Status: DISCONTINUED | OUTPATIENT
Start: 2018-01-01 | End: 2018-01-01

## 2018-01-01 RX ORDER — DIPHENOXYLATE HYDROCHLORIDE AND ATROPINE SULFATE 2.5; .025 MG/1; MG/1
1 TABLET ORAL
Status: DISCONTINUED | OUTPATIENT
Start: 2018-01-01 | End: 2018-03-28 | Stop reason: HOSPADM

## 2018-01-01 RX ORDER — HYDROMORPHONE HCL 110MG/55ML
1.5 PATIENT CONTROLLED ANALGESIA SYRINGE INTRAVENOUS
Status: DISCONTINUED | OUTPATIENT
Start: 2018-01-01 | End: 2018-03-28 | Stop reason: HOSPADM

## 2018-01-01 RX ORDER — ACETAMINOPHEN 650 MG/1
650 SUPPOSITORY RECTAL EVERY 4 HOURS PRN
Status: DISCONTINUED | OUTPATIENT
Start: 2018-01-01 | End: 2018-03-28 | Stop reason: HOSPADM

## 2018-01-01 RX ORDER — MORPHINE SULFATE 2 MG/ML
2 INJECTION, SOLUTION INTRAMUSCULAR; INTRAVENOUS
Status: DISCONTINUED | OUTPATIENT
Start: 2018-01-01 | End: 2018-03-28 | Stop reason: HOSPADM

## 2018-01-01 RX ORDER — LORAZEPAM 2 MG/ML
1 CONCENTRATE ORAL
Status: DISCONTINUED | OUTPATIENT
Start: 2018-01-01 | End: 2018-03-28 | Stop reason: HOSPADM

## 2018-01-01 RX ORDER — ASPIRIN 325 MG
325 TABLET ORAL DAILY
Qty: 30 TABLET | Refills: 0 | Status: SHIPPED | OUTPATIENT
Start: 2018-01-01

## 2018-01-01 RX ORDER — LORAZEPAM 2 MG/ML
1 INJECTION INTRAMUSCULAR
Status: DISCONTINUED | OUTPATIENT
Start: 2018-01-01 | End: 2018-03-28 | Stop reason: HOSPADM

## 2018-01-01 RX ORDER — TRAZODONE HYDROCHLORIDE 50 MG/1
75 TABLET ORAL NIGHTLY
Status: DISCONTINUED | OUTPATIENT
Start: 2018-01-01 | End: 2018-01-01

## 2018-01-01 RX ORDER — CHOLECALCIFEROL (VITAMIN D3) 125 MCG
500 CAPSULE ORAL DAILY
Status: DISCONTINUED | OUTPATIENT
Start: 2018-01-01 | End: 2018-01-01

## 2018-01-01 RX ORDER — SODIUM CHLORIDE, SODIUM LACTATE, POTASSIUM CHLORIDE, CALCIUM CHLORIDE 600; 310; 30; 20 MG/100ML; MG/100ML; MG/100ML; MG/100ML
30 INJECTION, SOLUTION INTRAVENOUS CONTINUOUS
Status: DISCONTINUED | OUTPATIENT
Start: 2018-01-01 | End: 2018-01-01 | Stop reason: HOSPADM

## 2018-01-01 RX ORDER — WARFARIN SODIUM 5 MG/1
5 TABLET ORAL
Status: DISCONTINUED | OUTPATIENT
Start: 2018-01-01 | End: 2018-01-01

## 2018-01-01 RX ORDER — SODIUM CHLORIDE 0.9 % (FLUSH) 0.9 %
1-10 SYRINGE (ML) INJECTION AS NEEDED
Status: DISCONTINUED | OUTPATIENT
Start: 2018-01-01 | End: 2018-01-01 | Stop reason: HOSPADM

## 2018-01-01 RX ORDER — RALOXIFENE HYDROCHLORIDE 60 MG/1
60 TABLET, FILM COATED ORAL DAILY
COMMUNITY
End: 2018-01-01

## 2018-01-01 RX ORDER — LORAZEPAM 2 MG/ML
0.5 CONCENTRATE ORAL
Status: DISCONTINUED | OUTPATIENT
Start: 2018-01-01 | End: 2018-03-28 | Stop reason: HOSPADM

## 2018-01-01 RX ORDER — HALOPERIDOL 2 MG/ML
1 SOLUTION ORAL EVERY 4 HOURS PRN
Status: DISCONTINUED | OUTPATIENT
Start: 2018-01-01 | End: 2018-03-28 | Stop reason: HOSPADM

## 2018-01-01 RX ORDER — MORPHINE SULFATE 20 MG/ML
20 SOLUTION ORAL
Status: DISCONTINUED | OUTPATIENT
Start: 2018-01-01 | End: 2018-03-28 | Stop reason: HOSPADM

## 2018-01-01 RX ORDER — LIDOCAINE HYDROCHLORIDE 10 MG/ML
20 INJECTION, SOLUTION INFILTRATION; PERINEURAL ONCE
Status: COMPLETED | OUTPATIENT
Start: 2018-01-01 | End: 2018-01-01

## 2018-01-01 RX ORDER — LORAZEPAM 2 MG/ML
2 CONCENTRATE ORAL
Status: DISCONTINUED | OUTPATIENT
Start: 2018-01-01 | End: 2018-03-28 | Stop reason: HOSPADM

## 2018-01-01 RX ORDER — MORPHINE SULFATE 20 MG/ML
5 SOLUTION ORAL
Status: DISCONTINUED | OUTPATIENT
Start: 2018-01-01 | End: 2018-03-28 | Stop reason: HOSPADM

## 2018-01-01 RX ORDER — PROPOFOL 10 MG/ML
VIAL (ML) INTRAVENOUS AS NEEDED
Status: DISCONTINUED | OUTPATIENT
Start: 2018-01-01 | End: 2018-01-01 | Stop reason: SURG

## 2018-01-01 RX ORDER — BISACODYL 10 MG
10 SUPPOSITORY, RECTAL RECTAL DAILY PRN
Status: DISCONTINUED | OUTPATIENT
Start: 2018-01-01 | End: 2018-03-28 | Stop reason: HOSPADM

## 2018-01-01 RX ORDER — LORAZEPAM 2 MG/ML
0.5 INJECTION INTRAMUSCULAR
Status: DISCONTINUED | OUTPATIENT
Start: 2018-01-01 | End: 2018-03-28 | Stop reason: HOSPADM

## 2018-01-01 RX ORDER — PHENYLEPHRINE HCL IN 0.9% NACL 0.5 MG/5ML
.5-3 SYRINGE (ML) INTRAVENOUS
Status: DISCONTINUED | OUTPATIENT
Start: 2018-01-01 | End: 2018-01-01

## 2018-01-01 RX ORDER — ONDANSETRON 4 MG/1
4 TABLET, ORALLY DISINTEGRATING ORAL EVERY 6 HOURS PRN
Status: DISCONTINUED | OUTPATIENT
Start: 2018-01-01 | End: 2018-03-28 | Stop reason: HOSPADM

## 2018-01-01 RX ORDER — TIZANIDINE 4 MG/1
4 TABLET ORAL NIGHTLY
Status: DISCONTINUED | OUTPATIENT
Start: 2018-01-01 | End: 2018-01-01

## 2018-01-01 RX ORDER — OXYCODONE HCL 5 MG/5 ML
5 SOLUTION, ORAL ORAL EVERY 4 HOURS PRN
Qty: 473 ML | Refills: 0 | Status: SHIPPED | OUTPATIENT
Start: 2018-01-01

## 2018-01-01 RX ORDER — MAGNESIUM OXIDE 500 MG
TABLET ORAL
Refills: 2 | COMMUNITY
Start: 2018-01-01 | End: 2018-01-01 | Stop reason: SDUPTHER

## 2018-01-01 RX ORDER — OXYCODONE HCL 5 MG/5 ML
5 SOLUTION, ORAL ORAL EVERY 4 HOURS PRN
Qty: 473 ML | Refills: 0 | Status: SHIPPED | OUTPATIENT
Start: 2018-01-01 | End: 2018-01-01 | Stop reason: SDUPTHER

## 2018-01-01 RX ORDER — SODIUM CHLORIDE 9 MG/ML
250 INJECTION, SOLUTION INTRAVENOUS ONCE
Status: CANCELLED | OUTPATIENT
Start: 2018-01-01

## 2018-01-01 RX ORDER — MORPHINE SULFATE 2 MG/ML
1 INJECTION, SOLUTION INTRAMUSCULAR; INTRAVENOUS EVERY 4 HOURS PRN
Status: DISCONTINUED | OUTPATIENT
Start: 2018-01-01 | End: 2018-01-01

## 2018-01-01 RX ORDER — BISACODYL 5 MG/1
5 TABLET, DELAYED RELEASE ORAL DAILY PRN
Status: DISCONTINUED | OUTPATIENT
Start: 2018-01-01 | End: 2018-03-28 | Stop reason: HOSPADM

## 2018-01-01 RX ORDER — CHOLECALCIFEROL (VITAMIN D3) 125 MCG
500 CAPSULE ORAL DAILY
Status: DISCONTINUED | OUTPATIENT
Start: 2018-01-01 | End: 2018-01-01 | Stop reason: HOSPADM

## 2018-01-01 RX ORDER — SENNA AND DOCUSATE SODIUM 50; 8.6 MG/1; MG/1
2 TABLET, FILM COATED ORAL 2 TIMES DAILY
Status: DISCONTINUED | OUTPATIENT
Start: 2018-01-01 | End: 2018-01-01

## 2018-01-01 RX ORDER — NITROGLYCERIN 0.4 MG/1
0.4 TABLET SUBLINGUAL
Status: DISCONTINUED | OUTPATIENT
Start: 2018-01-01 | End: 2018-03-28 | Stop reason: HOSPADM

## 2018-01-01 RX ORDER — HYDROCODONE BITARTRATE AND ACETAMINOPHEN 5; 325 MG/1; MG/1
1 TABLET ORAL EVERY 4 HOURS PRN
Status: DISCONTINUED | OUTPATIENT
Start: 2018-01-01 | End: 2018-01-01 | Stop reason: HOSPADM

## 2018-01-01 RX ORDER — ATORVASTATIN CALCIUM 20 MG/1
20 TABLET, FILM COATED ORAL DAILY
Qty: 30 TABLET | Refills: 0 | Status: SHIPPED | OUTPATIENT
Start: 2018-01-01

## 2018-01-01 RX ORDER — FOLIC ACID 1 MG/1
1 TABLET ORAL DAILY
Qty: 30 TABLET | Refills: 0 | Status: SHIPPED | OUTPATIENT
Start: 2018-01-01

## 2018-01-01 RX ORDER — OXYCODONE HCL 5 MG/5 ML
5 SOLUTION, ORAL ORAL ONCE
Status: DISCONTINUED | OUTPATIENT
Start: 2018-01-01 | End: 2018-01-01 | Stop reason: SDUPTHER

## 2018-01-01 RX ORDER — CHOLECALCIFEROL (VITAMIN D3) 125 MCG
5 CAPSULE ORAL NIGHTLY
Status: DISCONTINUED | OUTPATIENT
Start: 2018-01-01 | End: 2018-01-01

## 2018-01-01 RX ORDER — TRAMADOL HYDROCHLORIDE 50 MG/1
50 TABLET ORAL EVERY 6 HOURS PRN
Qty: 60 TABLET | Refills: 0 | Status: SHIPPED | OUTPATIENT
Start: 2018-01-01 | End: 2018-01-01

## 2018-01-01 RX ORDER — HYDROCODONE BITARTRATE AND ACETAMINOPHEN 5; 325 MG/1; MG/1
1 TABLET ORAL EVERY 4 HOURS PRN
Status: DISCONTINUED | OUTPATIENT
Start: 2018-01-01 | End: 2018-01-01

## 2018-01-01 RX ORDER — MIRTAZAPINE 15 MG/1
15 TABLET, FILM COATED ORAL NIGHTLY
Status: DISCONTINUED | OUTPATIENT
Start: 2018-01-01 | End: 2018-01-01

## 2018-01-01 RX ORDER — VANCOMYCIN HYDROCHLORIDE 1 G/200ML
1000 INJECTION, SOLUTION INTRAVENOUS EVERY 12 HOURS
Status: DISCONTINUED | OUTPATIENT
Start: 2018-01-01 | End: 2018-01-01

## 2018-01-01 RX ORDER — LORAZEPAM 2 MG/ML
INJECTION INTRAMUSCULAR
Status: COMPLETED
Start: 2018-01-01 | End: 2018-01-01

## 2018-01-01 RX ORDER — MIRTAZAPINE 15 MG/1
15 TABLET, FILM COATED ORAL NIGHTLY
Qty: 30 TABLET | Refills: 0 | Status: SHIPPED | OUTPATIENT
Start: 2018-01-01

## 2018-01-01 RX ORDER — LORAZEPAM 2 MG/ML
2 INJECTION INTRAMUSCULAR
Status: DISCONTINUED | OUTPATIENT
Start: 2018-01-01 | End: 2018-03-28 | Stop reason: HOSPADM

## 2018-01-01 RX ORDER — GLYCOPYRROLATE 0.2 MG/ML
0.2 INJECTION INTRAMUSCULAR; INTRAVENOUS
Status: DISCONTINUED | OUTPATIENT
Start: 2018-01-01 | End: 2018-03-28 | Stop reason: HOSPADM

## 2018-01-01 RX ORDER — OXYCODONE HCL 5 MG/5 ML
5 SOLUTION, ORAL ORAL EVERY 6 HOURS
Status: DISCONTINUED | OUTPATIENT
Start: 2018-01-01 | End: 2018-03-28 | Stop reason: HOSPADM

## 2018-01-01 RX ORDER — ONDANSETRON 4 MG/1
4 TABLET, FILM COATED ORAL EVERY 6 HOURS PRN
Status: DISCONTINUED | OUTPATIENT
Start: 2018-01-01 | End: 2018-03-28 | Stop reason: HOSPADM

## 2018-01-01 RX ORDER — NALOXONE HCL 0.4 MG/ML
0.4 VIAL (ML) INJECTION
Status: DISCONTINUED | OUTPATIENT
Start: 2018-01-01 | End: 2018-01-01

## 2018-01-01 RX ORDER — GLYCOPYRROLATE 0.2 MG/ML
0.4 INJECTION INTRAMUSCULAR; INTRAVENOUS
Status: DISCONTINUED | OUTPATIENT
Start: 2018-01-01 | End: 2018-03-28 | Stop reason: HOSPADM

## 2018-01-01 RX ORDER — SENNA AND DOCUSATE SODIUM 50; 8.6 MG/1; MG/1
2 TABLET, FILM COATED ORAL NIGHTLY
Status: DISCONTINUED | OUTPATIENT
Start: 2018-01-01 | End: 2018-01-01

## 2018-01-01 RX ORDER — OXYCODONE HCL 5 MG/5 ML
5 SOLUTION, ORAL ORAL EVERY 4 HOURS PRN
Status: DISCONTINUED | OUTPATIENT
Start: 2018-01-01 | End: 2018-01-01

## 2018-01-01 RX ORDER — CHOLECALCIFEROL (VITAMIN D3) 125 MCG
5 CAPSULE ORAL NIGHTLY
Status: DISCONTINUED | OUTPATIENT
Start: 2018-01-01 | End: 2018-01-01 | Stop reason: HOSPADM

## 2018-01-01 RX ORDER — ONDANSETRON 2 MG/ML
4 INJECTION INTRAMUSCULAR; INTRAVENOUS EVERY 6 HOURS PRN
Status: DISCONTINUED | OUTPATIENT
Start: 2018-01-01 | End: 2018-03-28 | Stop reason: HOSPADM

## 2018-01-01 RX ORDER — HYDROMORPHONE HCL 110MG/55ML
1 PATIENT CONTROLLED ANALGESIA SYRINGE INTRAVENOUS
Status: DISCONTINUED | OUTPATIENT
Start: 2018-01-01 | End: 2018-03-28 | Stop reason: HOSPADM

## 2018-01-01 RX ORDER — ACETAMINOPHEN 500 MG
1000 TABLET ORAL ONCE
Status: COMPLETED | OUTPATIENT
Start: 2018-01-01 | End: 2018-01-01

## 2018-01-01 RX ORDER — MORPHINE SULFATE 10 MG/ML
6 INJECTION INTRAMUSCULAR; INTRAVENOUS; SUBCUTANEOUS
Status: DISCONTINUED | OUTPATIENT
Start: 2018-01-01 | End: 2018-03-28 | Stop reason: HOSPADM

## 2018-01-01 RX ORDER — BISACODYL 10 MG
10 SUPPOSITORY, RECTAL RECTAL DAILY PRN
Status: DISCONTINUED | OUTPATIENT
Start: 2018-01-01 | End: 2018-01-01 | Stop reason: HOSPADM

## 2018-01-01 RX ORDER — FOLIC ACID 1 MG/1
1 TABLET ORAL DAILY
Status: DISCONTINUED | OUTPATIENT
Start: 2018-01-01 | End: 2018-01-01 | Stop reason: HOSPADM

## 2018-01-01 RX ORDER — DEXTROSE, SODIUM CHLORIDE, AND POTASSIUM CHLORIDE 5; .9; .15 G/100ML; G/100ML; G/100ML
100 INJECTION INTRAVENOUS CONTINUOUS
Status: DISCONTINUED | OUTPATIENT
Start: 2018-01-01 | End: 2018-01-01

## 2018-01-01 RX ORDER — ALUMINA, MAGNESIA, AND SIMETHICONE 2400; 2400; 240 MG/30ML; MG/30ML; MG/30ML
15 SUSPENSION ORAL EVERY 6 HOURS PRN
Status: DISCONTINUED | OUTPATIENT
Start: 2018-01-01 | End: 2018-03-28 | Stop reason: HOSPADM

## 2018-01-01 RX ORDER — MIRTAZAPINE 15 MG/1
15 TABLET, FILM COATED ORAL NIGHTLY
Status: DISCONTINUED | OUTPATIENT
Start: 2018-01-01 | End: 2018-01-01 | Stop reason: HOSPADM

## 2018-01-01 RX ORDER — HALOPERIDOL 1 MG/1
1 TABLET ORAL EVERY 4 HOURS PRN
Status: DISCONTINUED | OUTPATIENT
Start: 2018-01-01 | End: 2018-03-28 | Stop reason: HOSPADM

## 2018-01-01 RX ORDER — SODIUM CHLORIDE, SODIUM LACTATE, POTASSIUM CHLORIDE, CALCIUM CHLORIDE 600; 310; 30; 20 MG/100ML; MG/100ML; MG/100ML; MG/100ML
50 INJECTION, SOLUTION INTRAVENOUS CONTINUOUS
Status: DISCONTINUED | OUTPATIENT
Start: 2018-01-01 | End: 2018-01-01

## 2018-01-01 RX ORDER — DULOXETIN HYDROCHLORIDE 30 MG/1
30 CAPSULE, DELAYED RELEASE ORAL DAILY
Status: DISCONTINUED | OUTPATIENT
Start: 2018-01-01 | End: 2018-01-01

## 2018-01-01 RX ORDER — NALOXONE HCL 0.4 MG/ML
0.4 VIAL (ML) INJECTION
Status: DISCONTINUED | OUTPATIENT
Start: 2018-01-01 | End: 2018-03-28 | Stop reason: HOSPADM

## 2018-01-01 RX ORDER — ONDANSETRON HYDROCHLORIDE 8 MG/1
8 TABLET, FILM COATED ORAL 3 TIMES DAILY PRN
Qty: 30 TABLET | Refills: 5 | Status: SHIPPED | OUTPATIENT
Start: 2018-01-01

## 2018-01-01 RX ORDER — HYDROCODONE BITARTRATE AND ACETAMINOPHEN 5; 325 MG/1; MG/1
1 TABLET ORAL EVERY 4 HOURS PRN
Qty: 60 TABLET | Refills: 0 | Status: SHIPPED | OUTPATIENT
Start: 2018-01-01 | End: 2018-01-01

## 2018-01-01 RX ORDER — DOCUSATE SODIUM 50 MG/5 ML
100 LIQUID (ML) ORAL 2 TIMES DAILY PRN
Status: DISCONTINUED | OUTPATIENT
Start: 2018-01-01 | End: 2018-01-01 | Stop reason: HOSPADM

## 2018-01-01 RX ORDER — ACETAMINOPHEN 160 MG/5ML
650 SOLUTION ORAL EVERY 4 HOURS PRN
Status: DISCONTINUED | OUTPATIENT
Start: 2018-01-01 | End: 2018-03-28 | Stop reason: HOSPADM

## 2018-01-01 RX ORDER — OXYCODONE HYDROCHLORIDE 5 MG/1
5 TABLET ORAL ONCE
Status: COMPLETED | OUTPATIENT
Start: 2018-01-01 | End: 2018-01-01

## 2018-01-01 RX ORDER — IPRATROPIUM BROMIDE AND ALBUTEROL SULFATE 2.5; .5 MG/3ML; MG/3ML
3 SOLUTION RESPIRATORY (INHALATION) EVERY 6 HOURS PRN
Status: DISCONTINUED | OUTPATIENT
Start: 2018-01-01 | End: 2018-01-01

## 2018-01-01 RX ORDER — UREA 10 %
3 LOTION (ML) TOPICAL NIGHTLY
Status: DISCONTINUED | OUTPATIENT
Start: 2018-01-01 | End: 2018-01-01

## 2018-01-01 RX ORDER — SODIUM CHLORIDE 9 MG/ML
50 INJECTION, SOLUTION INTRAVENOUS CONTINUOUS
Status: DISCONTINUED | OUTPATIENT
Start: 2018-01-01 | End: 2018-01-01

## 2018-01-01 RX ORDER — ATORVASTATIN CALCIUM 20 MG/1
20 TABLET, FILM COATED ORAL DAILY
Status: DISCONTINUED | OUTPATIENT
Start: 2018-01-01 | End: 2018-01-01 | Stop reason: HOSPADM

## 2018-01-01 RX ORDER — TEMAZEPAM 7.5 MG/1
7.5 CAPSULE ORAL NIGHTLY PRN
Qty: 30 CAPSULE | Refills: 0 | OUTPATIENT
Start: 2018-01-01

## 2018-01-01 RX ORDER — HYDROMORPHONE HYDROCHLORIDE 1 MG/ML
0.5 INJECTION, SOLUTION INTRAMUSCULAR; INTRAVENOUS; SUBCUTANEOUS
Status: DISCONTINUED | OUTPATIENT
Start: 2018-01-01 | End: 2018-03-28 | Stop reason: HOSPADM

## 2018-01-01 RX ORDER — HEPARIN SODIUM 5000 [USP'U]/ML
40-80 INJECTION, SOLUTION INTRAVENOUS; SUBCUTANEOUS EVERY 6 HOURS PRN
Status: DISCONTINUED | OUTPATIENT
Start: 2018-01-01 | End: 2018-01-01

## 2018-01-01 RX ORDER — TRAMADOL HYDROCHLORIDE 50 MG/1
50 TABLET ORAL EVERY 6 HOURS PRN
Status: DISCONTINUED | OUTPATIENT
Start: 2018-01-01 | End: 2018-01-01 | Stop reason: HOSPADM

## 2018-01-01 RX ORDER — DOCUSATE SODIUM 100 MG/1
100 CAPSULE, LIQUID FILLED ORAL 2 TIMES DAILY
Status: DISCONTINUED | OUTPATIENT
Start: 2018-01-01 | End: 2018-01-01

## 2018-01-01 RX ORDER — DEXTROSE, SODIUM CHLORIDE, AND POTASSIUM CHLORIDE 5; .9; .15 G/100ML; G/100ML; G/100ML
100 INJECTION INTRAVENOUS CONTINUOUS
Status: DISPENSED | OUTPATIENT
Start: 2018-01-01 | End: 2018-01-01

## 2018-01-01 RX ORDER — ACETAMINOPHEN 325 MG/1
650 TABLET ORAL EVERY 4 HOURS PRN
Status: DISCONTINUED | OUTPATIENT
Start: 2018-01-01 | End: 2018-01-01 | Stop reason: HOSPADM

## 2018-01-01 RX ORDER — ACETAMINOPHEN 325 MG/1
650 TABLET ORAL EVERY 4 HOURS PRN
Status: DISCONTINUED | OUTPATIENT
Start: 2018-01-01 | End: 2018-01-01

## 2018-01-01 RX ORDER — AMOXICILLIN AND CLAVULANATE POTASSIUM 600; 42.9 MG/5ML; MG/5ML
600 POWDER, FOR SUSPENSION ORAL EVERY 12 HOURS
Qty: 70 ML | Refills: 0 | Status: SHIPPED | OUTPATIENT
Start: 2018-01-01 | End: 2018-03-30

## 2018-01-01 RX ORDER — SODIUM CHLORIDE 9 MG/ML
250 INJECTION, SOLUTION INTRAVENOUS ONCE
Status: COMPLETED | OUTPATIENT
Start: 2018-01-01 | End: 2018-01-01

## 2018-01-01 RX ORDER — IPRATROPIUM BROMIDE AND ALBUTEROL SULFATE 2.5; .5 MG/3ML; MG/3ML
3 SOLUTION RESPIRATORY (INHALATION)
Status: DISCONTINUED | OUTPATIENT
Start: 2018-01-01 | End: 2018-03-28 | Stop reason: HOSPADM

## 2018-01-01 RX ORDER — LIDOCAINE 50 MG/G
3 PATCH TOPICAL
Status: DISCONTINUED | OUTPATIENT
Start: 2018-01-01 | End: 2018-01-01 | Stop reason: HOSPADM

## 2018-01-01 RX ADMIN — OXYCODONE HYDROCHLORIDE 5 MG: 5 SOLUTION ORAL at 10:00

## 2018-01-01 RX ADMIN — FOLIC ACID 1 MG: 1 TABLET ORAL at 08:26

## 2018-01-01 RX ADMIN — ACETAMINOPHEN 650 MG: 325 TABLET ORAL at 08:03

## 2018-01-01 RX ADMIN — DULOXETINE HYDROCHLORIDE 30 MG: 30 CAPSULE, DELAYED RELEASE ORAL at 07:25

## 2018-01-01 RX ADMIN — LORAZEPAM 2 MG: 2 INJECTION INTRAMUSCULAR; INTRAVENOUS at 18:52

## 2018-01-01 RX ADMIN — MIRTAZAPINE 7.5 MG: 15 TABLET, FILM COATED ORAL at 21:16

## 2018-01-01 RX ADMIN — ENOXAPARIN SODIUM 60 MG: 60 INJECTION SUBCUTANEOUS at 07:53

## 2018-01-01 RX ADMIN — NYSTATIN 500000 UNITS: 100000 SUSPENSION ORAL at 21:36

## 2018-01-01 RX ADMIN — DOCUSATE SODIUM -SENNOSIDES 2 TABLET: 50; 8.6 TABLET, COATED ORAL at 08:13

## 2018-01-01 RX ADMIN — NYSTATIN 500000 UNITS: 100000 SUSPENSION ORAL at 11:31

## 2018-01-01 RX ADMIN — DOCUSATE SODIUM -SENNOSIDES 2 TABLET: 50; 8.6 TABLET, COATED ORAL at 21:29

## 2018-01-01 RX ADMIN — NYSTATIN 500000 UNITS: 500000 SUSPENSION ORAL at 20:56

## 2018-01-01 RX ADMIN — HYDROCODONE BITARTRATE AND ACETAMINOPHEN 1 TABLET: 5; 325 TABLET ORAL at 21:19

## 2018-01-01 RX ADMIN — METOPROLOL TARTRATE 25 MG: 25 TABLET ORAL at 09:02

## 2018-01-01 RX ADMIN — ATORVASTATIN CALCIUM 20 MG: 20 TABLET, FILM COATED ORAL at 08:06

## 2018-01-01 RX ADMIN — Medication 5 MG: at 21:32

## 2018-01-01 RX ADMIN — FOLIC ACID 1 MG: 1 TABLET ORAL at 08:23

## 2018-01-01 RX ADMIN — ENOXAPARIN SODIUM 60 MG: 60 INJECTION SUBCUTANEOUS at 09:28

## 2018-01-01 RX ADMIN — Medication 5 MG: at 20:48

## 2018-01-01 RX ADMIN — ENOXAPARIN SODIUM 60 MG: 60 INJECTION SUBCUTANEOUS at 02:27

## 2018-01-01 RX ADMIN — RALOXIFENE HYDROCHLORIDE 60 MG: 60 TABLET, FILM COATED ORAL at 09:35

## 2018-01-01 RX ADMIN — DIATRIZOATE MEGLUMINE AND DIATRIZOATE SODIUM 30 ML: 600; 100 SOLUTION ORAL; RECTAL at 08:48

## 2018-01-01 RX ADMIN — CEFAZOLIN SODIUM 1 G: 1 INJECTION, SOLUTION INTRAVENOUS at 15:22

## 2018-01-01 RX ADMIN — LIDOCAINE 3 PATCH: 50 PATCH CUTANEOUS at 09:35

## 2018-01-01 RX ADMIN — METOPROLOL TARTRATE 25 MG: 25 TABLET ORAL at 08:31

## 2018-01-01 RX ADMIN — FOLIC ACID 1 MG: 1 TABLET ORAL at 10:00

## 2018-01-01 RX ADMIN — METOPROLOL TARTRATE 25 MG: 25 TABLET ORAL at 07:58

## 2018-01-01 RX ADMIN — ATORVASTATIN CALCIUM 20 MG: 20 TABLET, FILM COATED ORAL at 09:13

## 2018-01-01 RX ADMIN — HYDROCODONE BITARTRATE AND ACETAMINOPHEN 1 TABLET: 5; 325 TABLET ORAL at 21:47

## 2018-01-01 RX ADMIN — NYSTATIN 500000 UNITS: 500000 SUSPENSION ORAL at 22:11

## 2018-01-01 RX ADMIN — TRAZODONE HYDROCHLORIDE 75 MG: 50 TABLET ORAL at 20:47

## 2018-01-01 RX ADMIN — SODIUM CHLORIDE, POTASSIUM CHLORIDE, SODIUM LACTATE AND CALCIUM CHLORIDE 50 ML/HR: 600; 310; 30; 20 INJECTION, SOLUTION INTRAVENOUS at 15:27

## 2018-01-01 RX ADMIN — HYDROCODONE BITARTRATE AND ACETAMINOPHEN 1 TABLET: 5; 325 TABLET ORAL at 21:41

## 2018-01-01 RX ADMIN — HEPARIN SODIUM 27 UNITS/KG/HR: 10000 INJECTION, SOLUTION INTRAVENOUS at 07:25

## 2018-01-01 RX ADMIN — ATORVASTATIN CALCIUM 20 MG: 20 TABLET, FILM COATED ORAL at 08:01

## 2018-01-01 RX ADMIN — RALOXIFENE HYDROCHLORIDE 60 MG: 60 TABLET, FILM COATED ORAL at 08:19

## 2018-01-01 RX ADMIN — ATORVASTATIN CALCIUM 20 MG: 20 TABLET, FILM COATED ORAL at 10:00

## 2018-01-01 RX ADMIN — NYSTATIN 500000 UNITS: 100000 SUSPENSION ORAL at 09:01

## 2018-01-01 RX ADMIN — ASPIRIN 325 MG: 325 TABLET ORAL at 08:11

## 2018-01-01 RX ADMIN — IPRATROPIUM BROMIDE AND ALBUTEROL SULFATE 3 ML: .5; 3 SOLUTION RESPIRATORY (INHALATION) at 11:50

## 2018-01-01 RX ADMIN — TRAZODONE HYDROCHLORIDE 75 MG: 50 TABLET ORAL at 21:28

## 2018-01-01 RX ADMIN — CYANOCOBALAMIN TAB 500 MCG 500 MCG: 500 TAB at 08:03

## 2018-01-01 RX ADMIN — HYDROCODONE BITARTRATE AND ACETAMINOPHEN 1 TABLET: 5; 325 TABLET ORAL at 21:16

## 2018-01-01 RX ADMIN — METOPROLOL TARTRATE 25 MG: 25 TABLET ORAL at 21:09

## 2018-01-01 RX ADMIN — METOPROLOL TARTRATE 25 MG: 25 TABLET ORAL at 21:01

## 2018-01-01 RX ADMIN — HEPARIN SODIUM 4300 UNITS: 5000 INJECTION, SOLUTION INTRAVENOUS; SUBCUTANEOUS at 03:14

## 2018-01-01 RX ADMIN — ASPIRIN 325 MG: 325 TABLET ORAL at 09:02

## 2018-01-01 RX ADMIN — MIRTAZAPINE 15 MG: 15 TABLET, FILM COATED ORAL at 20:53

## 2018-01-01 RX ADMIN — ENOXAPARIN SODIUM 50 MG: 60 INJECTION SUBCUTANEOUS at 20:54

## 2018-01-01 RX ADMIN — APIXABAN 5 MG: 2.5 TABLET, FILM COATED ORAL at 08:32

## 2018-01-01 RX ADMIN — HYDROCODONE BITARTRATE AND ACETAMINOPHEN 1 TABLET: 5; 325 TABLET ORAL at 12:15

## 2018-01-01 RX ADMIN — ENOXAPARIN SODIUM 60 MG: 60 INJECTION SUBCUTANEOUS at 07:57

## 2018-01-01 RX ADMIN — OXYCODONE HYDROCHLORIDE 5 MG: 5 SOLUTION ORAL at 14:59

## 2018-01-01 RX ADMIN — ATORVASTATIN CALCIUM 20 MG: 20 TABLET, FILM COATED ORAL at 08:56

## 2018-01-01 RX ADMIN — MIRTAZAPINE 15 MG: 15 TABLET, FILM COATED ORAL at 20:35

## 2018-01-01 RX ADMIN — Medication 5 MG: at 22:00

## 2018-01-01 RX ADMIN — DOCUSATE SODIUM 100 MG: 50 LIQUID ORAL at 20:48

## 2018-01-01 RX ADMIN — HYDROCODONE BITARTRATE AND ACETAMINOPHEN 1 TABLET: 5; 325 TABLET ORAL at 08:26

## 2018-01-01 RX ADMIN — DOCUSATE SODIUM -SENNOSIDES 2 TABLET: 50; 8.6 TABLET, COATED ORAL at 20:18

## 2018-01-01 RX ADMIN — TRAZODONE HYDROCHLORIDE 75 MG: 50 TABLET ORAL at 20:48

## 2018-01-01 RX ADMIN — ENOXAPARIN SODIUM 50 MG: 60 INJECTION SUBCUTANEOUS at 08:23

## 2018-01-01 RX ADMIN — DULOXETINE HYDROCHLORIDE 30 MG: 30 CAPSULE, DELAYED RELEASE ORAL at 07:57

## 2018-01-01 RX ADMIN — FOLIC ACID 1 MG: 1 TABLET ORAL at 08:56

## 2018-01-01 RX ADMIN — ATORVASTATIN CALCIUM 20 MG: 20 TABLET, FILM COATED ORAL at 09:06

## 2018-01-01 RX ADMIN — CYANOCOBALAMIN TAB 500 MCG 500 MCG: 500 TAB at 21:10

## 2018-01-01 RX ADMIN — NYSTATIN 500000 UNITS: 100000 SUSPENSION ORAL at 11:30

## 2018-01-01 RX ADMIN — ATORVASTATIN CALCIUM 20 MG: 20 TABLET, FILM COATED ORAL at 07:38

## 2018-01-01 RX ADMIN — DOCUSATE SODIUM 100 MG: 50 LIQUID ORAL at 21:09

## 2018-01-01 RX ADMIN — SODIUM CHLORIDE 250 ML: 0.9 INJECTION, SOLUTION INTRAVENOUS at 11:30

## 2018-01-01 RX ADMIN — ATORVASTATIN CALCIUM 20 MG: 20 TABLET, FILM COATED ORAL at 08:28

## 2018-01-01 RX ADMIN — MIRTAZAPINE 7.5 MG: 15 TABLET, FILM COATED ORAL at 22:02

## 2018-01-01 RX ADMIN — HEPARIN SODIUM 26 UNITS/KG/HR: 10000 INJECTION, SOLUTION INTRAVENOUS at 15:40

## 2018-01-01 RX ADMIN — ATORVASTATIN CALCIUM 20 MG: 20 TABLET, FILM COATED ORAL at 21:30

## 2018-01-01 RX ADMIN — POTASSIUM CHLORIDE, DEXTROSE MONOHYDRATE AND SODIUM CHLORIDE 100 ML/HR: 150; 5; 900 INJECTION, SOLUTION INTRAVENOUS at 14:33

## 2018-01-01 RX ADMIN — NYSTATIN 500000 UNITS: 100000 SUSPENSION ORAL at 09:09

## 2018-01-01 RX ADMIN — ATORVASTATIN CALCIUM 20 MG: 20 TABLET, FILM COATED ORAL at 08:11

## 2018-01-01 RX ADMIN — ATORVASTATIN CALCIUM 20 MG: 20 TABLET, FILM COATED ORAL at 07:53

## 2018-01-01 RX ADMIN — RALOXIFENE HYDROCHLORIDE 60 MG: 60 TABLET, FILM COATED ORAL at 07:57

## 2018-01-01 RX ADMIN — METOPROLOL TARTRATE 25 MG: 25 TABLET ORAL at 20:16

## 2018-01-01 RX ADMIN — METOPROLOL TARTRATE 25 MG: 25 TABLET ORAL at 20:53

## 2018-01-01 RX ADMIN — FOLIC ACID 1 MG: 1 TABLET ORAL at 07:37

## 2018-01-01 RX ADMIN — HEPARIN SODIUM 4300 UNITS: 5000 INJECTION, SOLUTION INTRAVENOUS; SUBCUTANEOUS at 05:17

## 2018-01-01 RX ADMIN — LIDOCAINE 2 PATCH: 50 PATCH CUTANEOUS at 09:46

## 2018-01-01 RX ADMIN — ENOXAPARIN SODIUM 60 MG: 60 INJECTION SUBCUTANEOUS at 20:53

## 2018-01-01 RX ADMIN — FOLIC ACID 1 MG: 1 TABLET ORAL at 08:06

## 2018-01-01 RX ADMIN — CYANOCOBALAMIN TAB 500 MCG 500 MCG: 500 TAB at 08:13

## 2018-01-01 RX ADMIN — APIXABAN 5 MG: 2.5 TABLET, FILM COATED ORAL at 20:24

## 2018-01-01 RX ADMIN — NYSTATIN 500000 UNITS: 100000 SUSPENSION ORAL at 08:28

## 2018-01-01 RX ADMIN — Medication 5 MG: at 21:43

## 2018-01-01 RX ADMIN — DOCUSATE SODIUM 100 MG: 50 LIQUID ORAL at 21:01

## 2018-01-01 RX ADMIN — IPRATROPIUM BROMIDE AND ALBUTEROL SULFATE 3 ML: .5; 3 SOLUTION RESPIRATORY (INHALATION) at 04:06

## 2018-01-01 RX ADMIN — ACETAMINOPHEN 650 MG: 325 TABLET, FILM COATED ORAL at 21:43

## 2018-01-01 RX ADMIN — HEPARIN SODIUM 12 UNITS/KG/HR: 10000 INJECTION, SOLUTION INTRAVENOUS at 20:35

## 2018-01-01 RX ADMIN — IPRATROPIUM BROMIDE AND ALBUTEROL SULFATE 3 ML: .5; 3 SOLUTION RESPIRATORY (INHALATION) at 08:03

## 2018-01-01 RX ADMIN — DOCUSATE SODIUM 100 MG: 100 CAPSULE, LIQUID FILLED ORAL at 20:18

## 2018-01-01 RX ADMIN — METOPROLOL TARTRATE 25 MG: 25 TABLET ORAL at 09:36

## 2018-01-01 RX ADMIN — ACETAMINOPHEN 650 MG: 325 TABLET, FILM COATED ORAL at 06:19

## 2018-01-01 RX ADMIN — ACETAMINOPHEN 650 MG: 325 TABLET, FILM COATED ORAL at 21:38

## 2018-01-01 RX ADMIN — SODIUM CHLORIDE 125 ML/HR: 9 INJECTION, SOLUTION INTRAVENOUS at 01:15

## 2018-01-01 RX ADMIN — Medication 0.5 MCG/KG/MIN: at 20:29

## 2018-01-01 RX ADMIN — DULOXETINE HYDROCHLORIDE 30 MG: 30 CAPSULE, DELAYED RELEASE ORAL at 07:41

## 2018-01-01 RX ADMIN — CYANOCOBALAMIN TAB 500 MCG 500 MCG: 500 TAB at 09:28

## 2018-01-01 RX ADMIN — FOLIC ACID 1 MG: 1 TABLET ORAL at 08:01

## 2018-01-01 RX ADMIN — OXYCODONE HYDROCHLORIDE 5 MG: 5 TABLET ORAL at 16:54

## 2018-01-01 RX ADMIN — TRAZODONE HYDROCHLORIDE 50 MG: 50 TABLET ORAL at 20:25

## 2018-01-01 RX ADMIN — POLYETHYLENE GLYCOL 3350 17 G: 17 POWDER, FOR SOLUTION ORAL at 08:03

## 2018-01-01 RX ADMIN — LIDOCAINE 2 PATCH: 50 PATCH CUTANEOUS at 08:12

## 2018-01-01 RX ADMIN — Medication 3 MG: at 20:27

## 2018-01-01 RX ADMIN — NYSTATIN 500000 UNITS: 100000 SUSPENSION ORAL at 21:01

## 2018-01-01 RX ADMIN — DOCUSATE SODIUM 100 MG: 50 LIQUID ORAL at 08:28

## 2018-01-01 RX ADMIN — FOLIC ACID 1 MG: 1 TABLET ORAL at 07:35

## 2018-01-01 RX ADMIN — HYDROCODONE BITARTRATE AND ACETAMINOPHEN 1 TABLET: 5; 325 TABLET ORAL at 19:13

## 2018-01-01 RX ADMIN — ENOXAPARIN SODIUM 60 MG: 60 INJECTION SUBCUTANEOUS at 22:01

## 2018-01-01 RX ADMIN — APIXABAN 5 MG: 2.5 TABLET, FILM COATED ORAL at 07:58

## 2018-01-01 RX ADMIN — DOCUSATE SODIUM -SENNOSIDES 2 TABLET: 50; 8.6 TABLET, COATED ORAL at 20:53

## 2018-01-01 RX ADMIN — VANCOMYCIN HYDROCHLORIDE 1000 MG: 1 INJECTION, SOLUTION INTRAVENOUS at 14:41

## 2018-01-01 RX ADMIN — CYANOCOBALAMIN TAB 500 MCG 500 MCG: 500 TAB at 07:38

## 2018-01-01 RX ADMIN — TAZOBACTAM SODIUM AND PIPERACILLIN SODIUM 3.38 G: 375; 3 INJECTION, SOLUTION INTRAVENOUS at 22:56

## 2018-01-01 RX ADMIN — HYDROCODONE BITARTRATE AND ACETAMINOPHEN 2 TABLET: 7.5; 325 TABLET ORAL at 16:07

## 2018-01-01 RX ADMIN — HEPARIN SODIUM 22 UNITS/KG/HR: 10000 INJECTION, SOLUTION INTRAVENOUS at 20:03

## 2018-01-01 RX ADMIN — METOPROLOL TARTRATE 25 MG: 25 TABLET ORAL at 20:24

## 2018-01-01 RX ADMIN — Medication 5 MG: at 20:53

## 2018-01-01 RX ADMIN — SODIUM CHLORIDE 200 MG: 9 INJECTION, SOLUTION INTRAVENOUS at 11:47

## 2018-01-01 RX ADMIN — METOPROLOL TARTRATE 25 MG: 25 TABLET ORAL at 20:19

## 2018-01-01 RX ADMIN — NYSTATIN 500000 UNITS: 500000 SUSPENSION ORAL at 22:21

## 2018-01-01 RX ADMIN — HEPARIN SODIUM 22 UNITS/KG/HR: 10000 INJECTION, SOLUTION INTRAVENOUS at 02:26

## 2018-01-01 RX ADMIN — NYSTATIN 500000 UNITS: 100000 SUSPENSION ORAL at 07:53

## 2018-01-01 RX ADMIN — NYSTATIN 500000 UNITS: 100000 SUSPENSION ORAL at 11:55

## 2018-01-01 RX ADMIN — MAGNESIUM HYDROXIDE 10 ML: 2400 SUSPENSION ORAL at 12:19

## 2018-01-01 RX ADMIN — OXYCODONE HYDROCHLORIDE 5 MG: 5 SOLUTION ORAL at 20:41

## 2018-01-01 RX ADMIN — LIDOCAINE 3 PATCH: 50 PATCH CUTANEOUS at 20:06

## 2018-01-01 RX ADMIN — DOCUSATE SODIUM -SENNOSIDES 2 TABLET: 50; 8.6 TABLET, COATED ORAL at 21:10

## 2018-01-01 RX ADMIN — ATORVASTATIN CALCIUM 20 MG: 20 TABLET, FILM COATED ORAL at 08:32

## 2018-01-01 RX ADMIN — LIDOCAINE HYDROCHLORIDE 20 ML: 10 INJECTION, SOLUTION INFILTRATION; PERINEURAL at 11:56

## 2018-01-01 RX ADMIN — ENOXAPARIN SODIUM 60 MG: 60 INJECTION SUBCUTANEOUS at 14:58

## 2018-01-01 RX ADMIN — ASPIRIN 325 MG: 325 TABLET ORAL at 07:35

## 2018-01-01 RX ADMIN — TAZOBACTAM SODIUM AND PIPERACILLIN SODIUM 3.38 G: 375; 3 INJECTION, SOLUTION INTRAVENOUS at 23:06

## 2018-01-01 RX ADMIN — CYANOCOBALAMIN TAB 500 MCG 500 MCG: 500 TAB at 07:56

## 2018-01-01 RX ADMIN — METOPROLOL TARTRATE 25 MG: 25 TABLET ORAL at 21:48

## 2018-01-01 RX ADMIN — DOCUSATE SODIUM -SENNOSIDES 2 TABLET: 50; 8.6 TABLET, COATED ORAL at 20:16

## 2018-01-01 RX ADMIN — IPRATROPIUM BROMIDE AND ALBUTEROL SULFATE 3 ML: .5; 3 SOLUTION RESPIRATORY (INHALATION) at 19:54

## 2018-01-01 RX ADMIN — NYSTATIN 500000 UNITS: 100000 SUSPENSION ORAL at 20:20

## 2018-01-01 RX ADMIN — MIRTAZAPINE 15 MG: 15 TABLET, FILM COATED ORAL at 21:30

## 2018-01-01 RX ADMIN — ACETAMINOPHEN 650 MG: 325 TABLET, FILM COATED ORAL at 16:37

## 2018-01-01 RX ADMIN — DOCUSATE SODIUM -SENNOSIDES 2 TABLET: 50; 8.6 TABLET, COATED ORAL at 20:02

## 2018-01-01 RX ADMIN — NYSTATIN 500000 UNITS: 500000 SUSPENSION ORAL at 07:35

## 2018-01-01 RX ADMIN — METOPROLOL TARTRATE 25 MG: 25 TABLET ORAL at 08:13

## 2018-01-01 RX ADMIN — METOPROLOL TARTRATE 25 MG: 25 TABLET ORAL at 07:57

## 2018-01-01 RX ADMIN — MIRTAZAPINE 15 MG: 15 TABLET, FILM COATED ORAL at 22:21

## 2018-01-01 RX ADMIN — ASPIRIN 325 MG: 325 TABLET ORAL at 08:01

## 2018-01-01 RX ADMIN — TAZOBACTAM SODIUM AND PIPERACILLIN SODIUM 4.5 G: 500; 4 INJECTION, SOLUTION INTRAVENOUS at 16:44

## 2018-01-01 RX ADMIN — ATORVASTATIN CALCIUM 20 MG: 20 TABLET, FILM COATED ORAL at 07:56

## 2018-01-01 RX ADMIN — ASPIRIN 325 MG: 325 TABLET ORAL at 07:52

## 2018-01-01 RX ADMIN — Medication 5 MG: at 21:41

## 2018-01-01 RX ADMIN — METOPROLOL TARTRATE 25 MG: 25 TABLET ORAL at 08:28

## 2018-01-01 RX ADMIN — NYSTATIN 500000 UNITS: 100000 SUSPENSION ORAL at 07:42

## 2018-01-01 RX ADMIN — METOPROLOL TARTRATE 25 MG: 25 TABLET ORAL at 21:28

## 2018-01-01 RX ADMIN — DULOXETINE HYDROCHLORIDE 30 MG: 30 CAPSULE, DELAYED RELEASE ORAL at 08:19

## 2018-01-01 RX ADMIN — IOPAMIDOL 150 ML: 755 INJECTION, SOLUTION INTRAVENOUS at 13:19

## 2018-01-01 RX ADMIN — NYSTATIN 500000 UNITS: 100000 SUSPENSION ORAL at 17:47

## 2018-01-01 RX ADMIN — DOCUSATE SODIUM -SENNOSIDES 2 TABLET: 50; 8.6 TABLET, COATED ORAL at 20:29

## 2018-01-01 RX ADMIN — LIDOCAINE 3 PATCH: 50 PATCH CUTANEOUS at 08:28

## 2018-01-01 RX ADMIN — NYSTATIN 500000 UNITS: 500000 SUSPENSION ORAL at 11:24

## 2018-01-01 RX ADMIN — HEPARIN SODIUM 24 UNITS/KG/HR: 10000 INJECTION, SOLUTION INTRAVENOUS at 23:58

## 2018-01-01 RX ADMIN — DOCUSATE SODIUM 100 MG: 50 LIQUID ORAL at 21:51

## 2018-01-01 RX ADMIN — TRAZODONE HYDROCHLORIDE 50 MG: 50 TABLET ORAL at 20:24

## 2018-01-01 RX ADMIN — NYSTATIN 500000 UNITS: 100000 SUSPENSION ORAL at 08:22

## 2018-01-01 RX ADMIN — Medication 5 MG: at 21:48

## 2018-01-01 RX ADMIN — ASPIRIN 325 MG: 325 TABLET ORAL at 08:26

## 2018-01-01 RX ADMIN — TEMAZEPAM 7.5 MG: 7.5 CAPSULE ORAL at 21:24

## 2018-01-01 RX ADMIN — ATORVASTATIN CALCIUM 20 MG: 20 TABLET, FILM COATED ORAL at 09:46

## 2018-01-01 RX ADMIN — NYSTATIN 500000 UNITS: 500000 SUSPENSION ORAL at 08:03

## 2018-01-01 RX ADMIN — HEPARIN SODIUM 24 UNITS/KG/HR: 10000 INJECTION, SOLUTION INTRAVENOUS at 17:49

## 2018-01-01 RX ADMIN — ENOXAPARIN SODIUM 50 MG: 60 INJECTION SUBCUTANEOUS at 21:24

## 2018-01-01 RX ADMIN — APIXABAN 5 MG: 2.5 TABLET, FILM COATED ORAL at 20:02

## 2018-01-01 RX ADMIN — ENOXAPARIN SODIUM 60 MG: 60 INJECTION SUBCUTANEOUS at 20:00

## 2018-01-01 RX ADMIN — LORAZEPAM 2 MG: 2 INJECTION INTRAMUSCULAR at 18:52

## 2018-01-01 RX ADMIN — METOPROLOL TARTRATE 25 MG: 25 TABLET ORAL at 20:25

## 2018-01-01 RX ADMIN — DOCUSATE SODIUM -SENNOSIDES 2 TABLET: 50; 8.6 TABLET, COATED ORAL at 20:28

## 2018-01-01 RX ADMIN — ASPIRIN 325 MG: 325 TABLET ORAL at 09:13

## 2018-01-01 RX ADMIN — ENOXAPARIN SODIUM 50 MG: 60 INJECTION SUBCUTANEOUS at 08:12

## 2018-01-01 RX ADMIN — FOLIC ACID 1 MG: 1 TABLET ORAL at 09:06

## 2018-01-01 RX ADMIN — HYDROCODONE BITARTRATE AND ACETAMINOPHEN 1 TABLET: 5; 325 TABLET ORAL at 08:01

## 2018-01-01 RX ADMIN — DOCUSATE SODIUM -SENNOSIDES 2 TABLET: 50; 8.6 TABLET, COATED ORAL at 20:35

## 2018-01-01 RX ADMIN — ACETAMINOPHEN 650 MG: 325 TABLET ORAL at 02:34

## 2018-01-01 RX ADMIN — NYSTATIN 500000 UNITS: 100000 SUSPENSION ORAL at 17:22

## 2018-01-01 RX ADMIN — ACETAMINOPHEN 650 MG: 325 TABLET ORAL at 09:33

## 2018-01-01 RX ADMIN — TRAZODONE HYDROCHLORIDE 75 MG: 50 TABLET ORAL at 20:53

## 2018-01-01 RX ADMIN — METOPROLOL TARTRATE 25 MG: 25 TABLET ORAL at 20:48

## 2018-01-01 RX ADMIN — NYSTATIN 500000 UNITS: 100000 SUSPENSION ORAL at 17:28

## 2018-01-01 RX ADMIN — METOPROLOL TARTRATE 25 MG: 25 TABLET ORAL at 21:23

## 2018-01-01 RX ADMIN — APIXABAN 5 MG: 2.5 TABLET, FILM COATED ORAL at 07:57

## 2018-01-01 RX ADMIN — TEMAZEPAM 7.5 MG: 7.5 CAPSULE ORAL at 21:13

## 2018-01-01 RX ADMIN — Medication 5 MG: at 22:02

## 2018-01-01 RX ADMIN — DOCUSATE SODIUM 100 MG: 50 LIQUID ORAL at 07:41

## 2018-01-01 RX ADMIN — ENOXAPARIN SODIUM 40 MG: 40 INJECTION SUBCUTANEOUS at 20:36

## 2018-01-01 RX ADMIN — NYSTATIN 500000 UNITS: 100000 SUSPENSION ORAL at 16:34

## 2018-01-01 RX ADMIN — ATORVASTATIN CALCIUM 20 MG: 20 TABLET, FILM COATED ORAL at 09:28

## 2018-01-01 RX ADMIN — Medication 5 MG: at 21:47

## 2018-01-01 RX ADMIN — DOCUSATE SODIUM -SENNOSIDES 2 TABLET: 50; 8.6 TABLET, COATED ORAL at 23:00

## 2018-01-01 RX ADMIN — TAZOBACTAM SODIUM AND PIPERACILLIN SODIUM 3.38 G: 375; 3 INJECTION, SOLUTION INTRAVENOUS at 14:40

## 2018-01-01 RX ADMIN — ACETAMINOPHEN 1000 MG: 500 TABLET ORAL at 16:44

## 2018-01-01 RX ADMIN — IPRATROPIUM BROMIDE AND ALBUTEROL SULFATE 3 ML: .5; 3 SOLUTION RESPIRATORY (INHALATION) at 09:48

## 2018-01-01 RX ADMIN — ASPIRIN 325 MG: 325 TABLET ORAL at 07:53

## 2018-01-01 RX ADMIN — FOLIC ACID 1 MG: 1 TABLET ORAL at 09:46

## 2018-01-01 RX ADMIN — CYANOCOBALAMIN TAB 500 MCG 500 MCG: 500 TAB at 09:13

## 2018-01-01 RX ADMIN — FOLIC ACID 1 MG: 1 TABLET ORAL at 09:02

## 2018-01-01 RX ADMIN — NYSTATIN 500000 UNITS: 500000 SUSPENSION ORAL at 08:00

## 2018-01-01 RX ADMIN — NYSTATIN 500000 UNITS: 500000 SUSPENSION ORAL at 17:30

## 2018-01-01 RX ADMIN — DOCUSATE SODIUM -SENNOSIDES 2 TABLET: 50; 8.6 TABLET, COATED ORAL at 20:25

## 2018-01-01 RX ADMIN — DULOXETINE HYDROCHLORIDE 30 MG: 30 CAPSULE, DELAYED RELEASE ORAL at 08:32

## 2018-01-01 RX ADMIN — LIDOCAINE 1 PATCH: 50 PATCH CUTANEOUS at 09:27

## 2018-01-01 RX ADMIN — DOCUSATE SODIUM -SENNOSIDES 2 TABLET: 50; 8.6 TABLET, COATED ORAL at 21:22

## 2018-01-01 RX ADMIN — METOPROLOL TARTRATE 25 MG: 25 TABLET ORAL at 20:28

## 2018-01-01 RX ADMIN — ATORVASTATIN CALCIUM 20 MG: 20 TABLET, FILM COATED ORAL at 08:04

## 2018-01-01 RX ADMIN — LIDOCAINE 3 PATCH: 50 PATCH CUTANEOUS at 08:22

## 2018-01-01 RX ADMIN — ATORVASTATIN CALCIUM 20 MG: 20 TABLET, FILM COATED ORAL at 09:51

## 2018-01-01 RX ADMIN — TRAZODONE HYDROCHLORIDE 75 MG: 50 TABLET ORAL at 21:09

## 2018-01-01 RX ADMIN — TRAZODONE HYDROCHLORIDE 75 MG: 50 TABLET ORAL at 20:19

## 2018-01-01 RX ADMIN — TAZOBACTAM SODIUM AND PIPERACILLIN SODIUM 3.38 G: 375; 3 INJECTION, SOLUTION INTRAVENOUS at 06:18

## 2018-01-01 RX ADMIN — ENOXAPARIN SODIUM 60 MG: 60 INJECTION SUBCUTANEOUS at 12:33

## 2018-01-01 RX ADMIN — CYANOCOBALAMIN TAB 500 MCG 500 MCG: 500 TAB at 08:12

## 2018-01-01 RX ADMIN — ACETAMINOPHEN 650 MG: 325 TABLET ORAL at 07:57

## 2018-01-01 RX ADMIN — POLYETHYLENE GLYCOL 3350 17 G: 17 POWDER, FOR SOLUTION ORAL at 07:34

## 2018-01-01 RX ADMIN — METOPROLOL TARTRATE 25 MG: 25 TABLET ORAL at 20:29

## 2018-01-01 RX ADMIN — ENOXAPARIN SODIUM 50 MG: 60 INJECTION SUBCUTANEOUS at 20:13

## 2018-01-01 RX ADMIN — METOPROLOL TARTRATE 25 MG: 25 TABLET ORAL at 07:53

## 2018-01-01 RX ADMIN — NYSTATIN 500000 UNITS: 100000 SUSPENSION ORAL at 21:10

## 2018-01-01 RX ADMIN — FOLIC ACID 1 MG: 1 TABLET ORAL at 09:13

## 2018-01-01 RX ADMIN — ATORVASTATIN CALCIUM 20 MG: 20 TABLET, FILM COATED ORAL at 08:13

## 2018-01-01 RX ADMIN — MIRTAZAPINE 15 MG: 15 TABLET, FILM COATED ORAL at 20:00

## 2018-01-01 RX ADMIN — NYSTATIN 500000 UNITS: 100000 SUSPENSION ORAL at 09:06

## 2018-01-01 RX ADMIN — ENOXAPARIN SODIUM 60 MG: 60 INJECTION SUBCUTANEOUS at 23:30

## 2018-01-01 RX ADMIN — NYSTATIN 500000 UNITS: 100000 SUSPENSION ORAL at 17:48

## 2018-01-01 RX ADMIN — ATORVASTATIN CALCIUM 20 MG: 20 TABLET, FILM COATED ORAL at 07:57

## 2018-01-01 RX ADMIN — TRAZODONE HYDROCHLORIDE 75 MG: 50 TABLET ORAL at 21:42

## 2018-01-01 RX ADMIN — IPRATROPIUM BROMIDE AND ALBUTEROL SULFATE 3 ML: .5; 3 SOLUTION RESPIRATORY (INHALATION) at 06:22

## 2018-01-01 RX ADMIN — CYANOCOBALAMIN TAB 500 MCG 500 MCG: 500 TAB at 07:53

## 2018-01-01 RX ADMIN — NYSTATIN 500000 UNITS: 500000 SUSPENSION ORAL at 08:26

## 2018-01-01 RX ADMIN — ASPIRIN 325 MG: 325 TABLET ORAL at 09:28

## 2018-01-01 RX ADMIN — FOLIC ACID 1 MG: 1 TABLET ORAL at 07:53

## 2018-01-01 RX ADMIN — FOLIC ACID 1 MG: 1 TABLET ORAL at 08:12

## 2018-01-01 RX ADMIN — ACETAMINOPHEN 650 MG: 325 TABLET, FILM COATED ORAL at 00:48

## 2018-01-01 RX ADMIN — DOCUSATE SODIUM 100 MG: 50 LIQUID ORAL at 20:20

## 2018-01-01 RX ADMIN — NYSTATIN 500000 UNITS: 100000 SUSPENSION ORAL at 16:54

## 2018-01-01 RX ADMIN — VANCOMYCIN HYDROCHLORIDE 750 MG: 10 INJECTION, POWDER, LYOPHILIZED, FOR SOLUTION INTRAVENOUS at 06:07

## 2018-01-01 RX ADMIN — MIRTAZAPINE 7.5 MG: 15 TABLET, FILM COATED ORAL at 21:48

## 2018-01-01 RX ADMIN — ENOXAPARIN SODIUM 60 MG: 60 INJECTION SUBCUTANEOUS at 21:30

## 2018-01-01 RX ADMIN — MIRTAZAPINE 7.5 MG: 15 TABLET, FILM COATED ORAL at 21:42

## 2018-01-01 RX ADMIN — APIXABAN 5 MG: 2.5 TABLET, FILM COATED ORAL at 20:28

## 2018-01-01 RX ADMIN — TIZANIDINE 4 MG: 4 TABLET ORAL at 20:48

## 2018-01-01 RX ADMIN — LORAZEPAM 1 MG: 2 INJECTION INTRAMUSCULAR; INTRAVENOUS at 20:46

## 2018-01-01 RX ADMIN — FOLIC ACID 1 MG: 1 TABLET ORAL at 08:28

## 2018-01-01 RX ADMIN — CYANOCOBALAMIN TAB 500 MCG 500 MCG: 500 TAB at 09:06

## 2018-01-01 RX ADMIN — Medication 5 MG: at 21:22

## 2018-01-01 RX ADMIN — HYDROCODONE BITARTRATE AND ACETAMINOPHEN 1 TABLET: 5; 325 TABLET ORAL at 21:02

## 2018-01-01 RX ADMIN — HYDROCODONE BITARTRATE AND ACETAMINOPHEN 1 TABLET: 5; 325 TABLET ORAL at 09:12

## 2018-01-01 RX ADMIN — HYDROCODONE BITARTRATE AND ACETAMINOPHEN 1 TABLET: 5; 325 TABLET ORAL at 19:54

## 2018-01-01 RX ADMIN — ATORVASTATIN CALCIUM 20 MG: 20 TABLET, FILM COATED ORAL at 09:36

## 2018-01-01 RX ADMIN — HEPARIN SODIUM 25 UNITS/KG/HR: 10000 INJECTION, SOLUTION INTRAVENOUS at 10:30

## 2018-01-01 RX ADMIN — TRAZODONE HYDROCHLORIDE 50 MG: 50 TABLET ORAL at 21:10

## 2018-01-01 RX ADMIN — METOPROLOL TARTRATE 25 MG: 25 TABLET ORAL at 21:52

## 2018-01-01 RX ADMIN — METOPROLOL TARTRATE 25 MG: 25 TABLET ORAL at 07:56

## 2018-01-01 RX ADMIN — APIXABAN 5 MG: 2.5 TABLET, FILM COATED ORAL at 09:36

## 2018-01-01 RX ADMIN — NYSTATIN 500000 UNITS: 100000 SUSPENSION ORAL at 16:16

## 2018-01-01 RX ADMIN — FOLIC ACID 1 MG: 1 TABLET ORAL at 08:13

## 2018-01-01 RX ADMIN — PROPOFOL 120 MG: 10 INJECTION, EMULSION INTRAVENOUS at 15:21

## 2018-01-01 RX ADMIN — HYDROCODONE BITARTRATE AND ACETAMINOPHEN 1 TABLET: 5; 325 TABLET ORAL at 22:02

## 2018-01-01 RX ADMIN — ENOXAPARIN SODIUM 50 MG: 60 INJECTION SUBCUTANEOUS at 19:29

## 2018-01-01 RX ADMIN — VANCOMYCIN HYDROCHLORIDE 1250 MG: 10 INJECTION, POWDER, LYOPHILIZED, FOR SOLUTION INTRAVENOUS at 18:40

## 2018-01-01 RX ADMIN — DOCUSATE SODIUM 100 MG: 50 LIQUID ORAL at 09:13

## 2018-01-01 RX ADMIN — NYSTATIN 500000 UNITS: 100000 SUSPENSION ORAL at 17:38

## 2018-01-01 RX ADMIN — Medication 5 MG: at 21:17

## 2018-01-01 RX ADMIN — LIDOCAINE 3 PATCH: 50 PATCH CUTANEOUS at 07:35

## 2018-01-01 RX ADMIN — ASPIRIN 325 MG: 325 TABLET ORAL at 07:40

## 2018-01-01 RX ADMIN — VANCOMYCIN HYDROCHLORIDE 1000 MG: 1 INJECTION, SOLUTION INTRAVENOUS at 03:20

## 2018-01-01 RX ADMIN — ACETAMINOPHEN 650 MG: 325 TABLET, FILM COATED ORAL at 20:46

## 2018-01-01 RX ADMIN — DOCUSATE SODIUM 100 MG: 50 LIQUID ORAL at 08:23

## 2018-01-01 RX ADMIN — Medication 5 MG: at 21:51

## 2018-01-01 RX ADMIN — HYDROCODONE BITARTRATE AND ACETAMINOPHEN 1 TABLET: 5; 325 TABLET ORAL at 05:55

## 2018-01-01 RX ADMIN — ATORVASTATIN CALCIUM 20 MG: 20 TABLET, FILM COATED ORAL at 09:02

## 2018-01-01 RX ADMIN — NYSTATIN 500000 UNITS: 100000 SUSPENSION ORAL at 11:40

## 2018-01-01 RX ADMIN — NYSTATIN 500000 UNITS: 100000 SUSPENSION ORAL at 11:39

## 2018-01-01 RX ADMIN — Medication 5 MG: at 21:10

## 2018-01-01 RX ADMIN — OXYCODONE HYDROCHLORIDE 5 MG: 5 SOLUTION ORAL at 03:21

## 2018-01-01 RX ADMIN — NYSTATIN 500000 UNITS: 100000 SUSPENSION ORAL at 11:59

## 2018-01-01 RX ADMIN — TAZOBACTAM SODIUM AND PIPERACILLIN SODIUM 3.38 G: 375; 3 INJECTION, SOLUTION INTRAVENOUS at 07:03

## 2018-01-01 RX ADMIN — TRAZODONE HYDROCHLORIDE 75 MG: 50 TABLET ORAL at 21:22

## 2018-01-01 RX ADMIN — NYSTATIN 500000 UNITS: 100000 SUSPENSION ORAL at 21:09

## 2018-01-01 RX ADMIN — NYSTATIN 500000 UNITS: 100000 SUSPENSION ORAL at 21:51

## 2018-01-01 RX ADMIN — NYSTATIN 500000 UNITS: 100000 SUSPENSION ORAL at 21:24

## 2018-01-01 RX ADMIN — TRAZODONE HYDROCHLORIDE 75 MG: 50 TABLET ORAL at 21:32

## 2018-01-01 RX ADMIN — ATORVASTATIN CALCIUM 20 MG: 20 TABLET, FILM COATED ORAL at 07:35

## 2018-01-01 RX ADMIN — TAZOBACTAM SODIUM AND PIPERACILLIN SODIUM 3.38 G: 375; 3 INJECTION, SOLUTION INTRAVENOUS at 14:58

## 2018-01-01 RX ADMIN — ASPIRIN 325 MG: 325 TABLET ORAL at 08:56

## 2018-01-01 RX ADMIN — HEPARIN SODIUM 24 UNITS/KG/HR: 10000 INJECTION, SOLUTION INTRAVENOUS at 01:05

## 2018-01-01 RX ADMIN — Medication 5 MG: at 20:19

## 2018-01-01 RX ADMIN — CYANOCOBALAMIN TAB 500 MCG 500 MCG: 500 TAB at 08:06

## 2018-01-01 RX ADMIN — HEPARIN SODIUM 26 UNITS/KG/HR: 10000 INJECTION, SOLUTION INTRAVENOUS at 03:16

## 2018-01-01 RX ADMIN — METOPROLOL TARTRATE 25 MG: 25 TABLET ORAL at 07:25

## 2018-01-01 RX ADMIN — FOLIC ACID 1 MG: 1 TABLET ORAL at 07:40

## 2018-01-01 RX ADMIN — METOPROLOL TARTRATE 25 MG: 25 TABLET ORAL at 07:40

## 2018-01-01 RX ADMIN — NYSTATIN 500000 UNITS: 500000 SUSPENSION ORAL at 22:02

## 2018-01-01 RX ADMIN — ACETAMINOPHEN 650 MG: 325 TABLET ORAL at 07:52

## 2018-01-01 RX ADMIN — LIDOCAINE 2 PATCH: 50 PATCH CUTANEOUS at 08:27

## 2018-01-01 RX ADMIN — NYSTATIN 500000 UNITS: 100000 SUSPENSION ORAL at 07:40

## 2018-01-01 RX ADMIN — ATORVASTATIN CALCIUM 20 MG: 20 TABLET, FILM COATED ORAL at 07:52

## 2018-01-01 RX ADMIN — CYANOCOBALAMIN TAB 500 MCG 500 MCG: 500 TAB at 08:23

## 2018-01-01 RX ADMIN — DOCUSATE SODIUM 100 MG: 50 LIQUID ORAL at 21:42

## 2018-01-01 RX ADMIN — GLYCOPYRROLATE 0.2 MG: 0.2 INJECTION, SOLUTION INTRAMUSCULAR; INTRAVENOUS at 20:46

## 2018-01-01 RX ADMIN — METOPROLOL TARTRATE 25 MG: 25 TABLET ORAL at 08:06

## 2018-01-01 RX ADMIN — ATORVASTATIN CALCIUM 20 MG: 20 TABLET, FILM COATED ORAL at 08:26

## 2018-01-01 RX ADMIN — NYSTATIN 500000 UNITS: 100000 SUSPENSION ORAL at 20:48

## 2018-01-01 RX ADMIN — LIDOCAINE 2 PATCH: 50 PATCH CUTANEOUS at 07:53

## 2018-01-01 RX ADMIN — ACETAMINOPHEN 650 MG: 325 TABLET, FILM COATED ORAL at 19:30

## 2018-01-01 RX ADMIN — FOLIC ACID 1 MG: 1 TABLET ORAL at 21:30

## 2018-01-01 RX ADMIN — NYSTATIN 500000 UNITS: 100000 SUSPENSION ORAL at 20:53

## 2018-01-01 RX ADMIN — APIXABAN 5 MG: 2.5 TABLET, FILM COATED ORAL at 08:19

## 2018-01-01 RX ADMIN — OXYCODONE HYDROCHLORIDE 5 MG: 5 SOLUTION ORAL at 15:28

## 2018-01-01 RX ADMIN — MIRTAZAPINE 15 MG: 15 TABLET, FILM COATED ORAL at 21:47

## 2018-01-01 RX ADMIN — SODIUM CHLORIDE, POTASSIUM CHLORIDE, SODIUM LACTATE AND CALCIUM CHLORIDE: 600; 310; 30; 20 INJECTION, SOLUTION INTRAVENOUS at 14:34

## 2018-01-01 RX ADMIN — CYANOCOBALAMIN TAB 500 MCG 500 MCG: 500 TAB at 07:40

## 2018-01-01 RX ADMIN — APIXABAN 5 MG: 2.5 TABLET, FILM COATED ORAL at 20:16

## 2018-01-01 RX ADMIN — METOPROLOL TARTRATE 25 MG: 25 TABLET ORAL at 08:19

## 2018-01-01 RX ADMIN — VANCOMYCIN HYDROCHLORIDE 1000 MG: 1 INJECTION, SOLUTION INTRAVENOUS at 14:59

## 2018-01-01 RX ADMIN — MIRTAZAPINE 15 MG: 15 TABLET, FILM COATED ORAL at 20:18

## 2018-01-01 RX ADMIN — Medication 5 MG: at 22:21

## 2018-01-01 RX ADMIN — NYSTATIN 500000 UNITS: 100000 SUSPENSION ORAL at 08:06

## 2018-01-01 RX ADMIN — ENOXAPARIN SODIUM 60 MG: 60 INJECTION SUBCUTANEOUS at 21:19

## 2018-01-01 RX ADMIN — ENOXAPARIN SODIUM 60 MG: 60 INJECTION SUBCUTANEOUS at 08:27

## 2018-01-01 RX ADMIN — TRAZODONE HYDROCHLORIDE 75 MG: 50 TABLET ORAL at 21:00

## 2018-01-01 RX ADMIN — ASPIRIN 325 MG: 325 TABLET ORAL at 07:56

## 2018-01-01 RX ADMIN — TIZANIDINE 4 MG: 4 TABLET ORAL at 21:08

## 2018-01-01 RX ADMIN — PROPOFOL 160 MCG/KG/MIN: 10 INJECTION, EMULSION INTRAVENOUS at 15:21

## 2018-01-01 RX ADMIN — HYDROCODONE BITARTRATE AND ACETAMINOPHEN 1 TABLET: 5; 325 TABLET ORAL at 20:19

## 2018-01-01 RX ADMIN — METOPROLOL TARTRATE 25 MG: 25 TABLET ORAL at 20:02

## 2018-01-01 RX ADMIN — METOPROLOL TARTRATE 25 MG: 25 TABLET ORAL at 09:13

## 2018-01-01 RX ADMIN — FOLIC ACID 1 MG: 1 TABLET ORAL at 08:04

## 2018-01-01 RX ADMIN — MORPHINE SULFATE 4 MG: 4 INJECTION INTRAVENOUS at 20:15

## 2018-01-01 RX ADMIN — HYDROCODONE BITARTRATE AND ACETAMINOPHEN 1 TABLET: 5; 325 TABLET ORAL at 19:46

## 2018-01-01 RX ADMIN — IPRATROPIUM BROMIDE AND ALBUTEROL SULFATE 3 ML: .5; 3 SOLUTION RESPIRATORY (INHALATION) at 07:49

## 2018-01-01 RX ADMIN — ACETAMINOPHEN 650 MG: 325 TABLET ORAL at 21:09

## 2018-01-01 RX ADMIN — ACETAMINOPHEN 650 MG: 325 TABLET, FILM COATED ORAL at 00:55

## 2018-01-01 RX ADMIN — MIRTAZAPINE 7.5 MG: 15 TABLET, FILM COATED ORAL at 21:23

## 2018-01-01 RX ADMIN — CYANOCOBALAMIN TAB 500 MCG 500 MCG: 500 TAB at 09:01

## 2018-01-01 RX ADMIN — NYSTATIN 500000 UNITS: 100000 SUSPENSION ORAL at 12:20

## 2018-01-01 RX ADMIN — METOPROLOL TARTRATE 25 MG: 25 TABLET ORAL at 16:15

## 2018-01-01 RX ADMIN — LIDOCAINE 3 PATCH: 50 PATCH CUTANEOUS at 08:01

## 2018-01-01 RX ADMIN — IOPAMIDOL 100 ML: 612 INJECTION, SOLUTION INTRAVENOUS at 10:16

## 2018-01-01 RX ADMIN — Medication 3 MG: at 20:02

## 2018-01-01 RX ADMIN — DOCUSATE SODIUM 100 MG: 50 LIQUID ORAL at 09:01

## 2018-01-01 RX ADMIN — METOPROLOL TARTRATE 25 MG: 25 TABLET ORAL at 09:06

## 2018-01-01 RX ADMIN — ACETAMINOPHEN 650 MG: 325 TABLET ORAL at 09:46

## 2018-01-01 RX ADMIN — IPRATROPIUM BROMIDE AND ALBUTEROL SULFATE 3 ML: .5; 3 SOLUTION RESPIRATORY (INHALATION) at 12:00

## 2018-01-01 RX ADMIN — NYSTATIN 500000 UNITS: 100000 SUSPENSION ORAL at 12:07

## 2018-01-01 RX ADMIN — NYSTATIN 500000 UNITS: 100000 SUSPENSION ORAL at 21:42

## 2018-01-01 RX ADMIN — ASPIRIN 325 MG: 325 TABLET ORAL at 07:38

## 2018-01-01 RX ADMIN — ENOXAPARIN SODIUM 60 MG: 60 INJECTION SUBCUTANEOUS at 07:35

## 2018-01-01 RX ADMIN — HYDROCODONE BITARTRATE AND ACETAMINOPHEN 1 TABLET: 5; 325 TABLET ORAL at 21:48

## 2018-01-01 RX ADMIN — FOLIC ACID 1 MG: 1 TABLET ORAL at 07:25

## 2018-01-01 RX ADMIN — GADOBENATE DIMEGLUMINE 11 ML: 529 INJECTION, SOLUTION INTRAVENOUS at 11:30

## 2018-01-01 RX ADMIN — LIDOCAINE 3 PATCH: 50 PATCH CUTANEOUS at 07:57

## 2018-01-01 RX ADMIN — SODIUM CHLORIDE 125 ML/HR: 9 INJECTION, SOLUTION INTRAVENOUS at 16:44

## 2018-01-01 RX ADMIN — Medication 5 MG: at 21:18

## 2018-01-01 RX ADMIN — DULOXETINE HYDROCHLORIDE 30 MG: 30 CAPSULE, DELAYED RELEASE ORAL at 08:22

## 2018-01-01 RX ADMIN — HEPARIN SODIUM 4300 UNITS: 5000 INJECTION, SOLUTION INTRAVENOUS; SUBCUTANEOUS at 19:57

## 2018-01-01 RX ADMIN — Medication 5 MG: at 20:00

## 2018-01-01 RX ADMIN — CYANOCOBALAMIN TAB 500 MCG 500 MCG: 500 TAB at 08:56

## 2018-01-01 RX ADMIN — NYSTATIN 500000 UNITS: 100000 SUSPENSION ORAL at 21:31

## 2018-01-01 RX ADMIN — ATORVASTATIN CALCIUM 20 MG: 20 TABLET, FILM COATED ORAL at 08:03

## 2018-01-01 RX ADMIN — CYANOCOBALAMIN TAB 500 MCG 500 MCG: 500 TAB at 07:52

## 2018-01-01 RX ADMIN — NYSTATIN 500000 UNITS: 100000 SUSPENSION ORAL at 16:35

## 2018-01-01 RX ADMIN — DOCUSATE SODIUM 100 MG: 100 CAPSULE, LIQUID FILLED ORAL at 21:30

## 2018-01-01 RX ADMIN — OXYCODONE HYDROCHLORIDE 5 MG: 5 SOLUTION ORAL at 09:18

## 2018-01-01 RX ADMIN — HYDROCODONE BITARTRATE AND ACETAMINOPHEN 1 TABLET: 5; 325 TABLET ORAL at 15:04

## 2018-01-01 RX ADMIN — ENOXAPARIN SODIUM 50 MG: 60 INJECTION SUBCUTANEOUS at 09:56

## 2018-01-01 RX ADMIN — ACETAMINOPHEN 650 MG: 325 TABLET, FILM COATED ORAL at 23:55

## 2018-01-01 RX ADMIN — ATORVASTATIN CALCIUM 20 MG: 20 TABLET, FILM COATED ORAL at 08:22

## 2018-01-01 RX ADMIN — IPRATROPIUM BROMIDE AND ALBUTEROL SULFATE 3 ML: .5; 3 SOLUTION RESPIRATORY (INHALATION) at 14:44

## 2018-01-01 RX ADMIN — DOCUSATE SODIUM -SENNOSIDES 2 TABLET: 50; 8.6 TABLET, COATED ORAL at 20:48

## 2018-01-01 RX ADMIN — CYANOCOBALAMIN TAB 500 MCG 500 MCG: 500 TAB at 09:46

## 2018-01-01 RX ADMIN — HYDROCODONE BITARTRATE AND ACETAMINOPHEN 2 TABLET: 7.5; 325 TABLET ORAL at 20:18

## 2018-01-01 RX ADMIN — ATORVASTATIN CALCIUM 20 MG: 20 TABLET, FILM COATED ORAL at 09:09

## 2018-01-01 RX ADMIN — CYANOCOBALAMIN TAB 500 MCG 500 MCG: 500 TAB at 07:35

## 2018-01-01 RX ADMIN — IPRATROPIUM BROMIDE AND ALBUTEROL SULFATE 3 ML: .5; 3 SOLUTION RESPIRATORY (INHALATION) at 20:21

## 2018-01-01 RX ADMIN — METOPROLOL TARTRATE 25 MG: 25 TABLET ORAL at 08:22

## 2018-01-01 RX ADMIN — DOCUSATE SODIUM 100 MG: 50 LIQUID ORAL at 07:53

## 2018-01-01 RX ADMIN — DULOXETINE HYDROCHLORIDE 30 MG: 30 CAPSULE, DELAYED RELEASE ORAL at 08:13

## 2018-01-01 RX ADMIN — ASPIRIN 325 MG: 325 TABLET ORAL at 08:28

## 2018-01-01 RX ADMIN — TRAZODONE HYDROCHLORIDE 50 MG: 50 TABLET ORAL at 20:28

## 2018-01-01 RX ADMIN — NYSTATIN 500000 UNITS: 500000 SUSPENSION ORAL at 17:19

## 2018-01-01 RX ADMIN — ACETAMINOPHEN 650 MG: 325 TABLET, FILM COATED ORAL at 03:05

## 2018-01-01 RX ADMIN — FOLIC ACID 1 MG: 1 TABLET ORAL at 09:08

## 2018-01-01 RX ADMIN — NYSTATIN 500000 UNITS: 100000 SUSPENSION ORAL at 08:04

## 2018-01-01 RX ADMIN — METOPROLOL TARTRATE 25 MG: 25 TABLET ORAL at 08:04

## 2018-01-01 RX ADMIN — METOPROLOL TARTRATE 25 MG: 25 TABLET ORAL at 07:42

## 2018-01-01 RX ADMIN — HEPARIN SODIUM 25 UNITS/KG/HR: 10000 INJECTION, SOLUTION INTRAVENOUS at 05:28

## 2018-01-01 RX ADMIN — ENOXAPARIN SODIUM 60 MG: 60 INJECTION SUBCUTANEOUS at 08:56

## 2018-01-01 RX ADMIN — TIZANIDINE 4 MG: 4 TABLET ORAL at 21:43

## 2018-01-01 RX ADMIN — FLUDEOXYGLUCOSE F18 1 DOSE: 300 INJECTION INTRAVENOUS at 08:25

## 2018-01-01 RX ADMIN — Medication 3 MG: at 20:24

## 2018-01-01 RX ADMIN — VANCOMYCIN HYDROCHLORIDE 1000 MG: 1 INJECTION, SOLUTION INTRAVENOUS at 02:15

## 2018-01-01 RX ADMIN — CYANOCOBALAMIN TAB 500 MCG 500 MCG: 500 TAB at 08:00

## 2018-01-01 RX ADMIN — DOCUSATE SODIUM 100 MG: 100 CAPSULE, LIQUID FILLED ORAL at 20:35

## 2018-01-01 RX ADMIN — LIDOCAINE 3 PATCH: 50 PATCH CUTANEOUS at 04:13

## 2018-01-01 RX ADMIN — VANCOMYCIN HYDROCHLORIDE 1000 MG: 1 INJECTION, SOLUTION INTRAVENOUS at 15:27

## 2018-01-01 RX ADMIN — ACETAMINOPHEN 650 MG: 325 TABLET, FILM COATED ORAL at 08:05

## 2018-01-01 RX ADMIN — TRAZODONE HYDROCHLORIDE 75 MG: 50 TABLET ORAL at 21:51

## 2018-01-01 RX ADMIN — Medication 3 MG: at 20:25

## 2018-01-01 RX ADMIN — LIDOCAINE 3 PATCH: 50 PATCH CUTANEOUS at 09:06

## 2018-01-01 RX ADMIN — HYDROCODONE BITARTRATE AND ACETAMINOPHEN 1 TABLET: 5; 325 TABLET ORAL at 17:19

## 2018-01-01 RX ADMIN — ENOXAPARIN SODIUM 50 MG: 60 INJECTION SUBCUTANEOUS at 21:31

## 2018-01-01 RX ADMIN — HEPARIN SODIUM 28 UNITS/KG/HR: 10000 INJECTION, SOLUTION INTRAVENOUS at 21:27

## 2018-01-01 RX ADMIN — NYSTATIN 500000 UNITS: 100000 SUSPENSION ORAL at 20:46

## 2018-01-01 RX ADMIN — NYSTATIN 500000 UNITS: 100000 SUSPENSION ORAL at 11:21

## 2018-01-01 RX ADMIN — CYANOCOBALAMIN TAB 500 MCG 500 MCG: 500 TAB at 08:28

## 2018-01-01 RX ADMIN — ASPIRIN 325 MG: 325 TABLET ORAL at 09:06

## 2018-01-01 RX ADMIN — DOCUSATE SODIUM 100 MG: 100 CAPSULE, LIQUID FILLED ORAL at 09:51

## 2018-01-01 RX ADMIN — TAZOBACTAM SODIUM AND PIPERACILLIN SODIUM 3.38 G: 375; 3 INJECTION, SOLUTION INTRAVENOUS at 23:13

## 2018-01-01 RX ADMIN — ATORVASTATIN CALCIUM 20 MG: 20 TABLET, FILM COATED ORAL at 07:27

## 2018-01-01 RX ADMIN — CYANOCOBALAMIN TAB 500 MCG 500 MCG: 500 TAB at 08:26

## 2018-01-01 RX ADMIN — DULOXETINE HYDROCHLORIDE 30 MG: 30 CAPSULE, DELAYED RELEASE ORAL at 09:36

## 2018-01-01 RX ADMIN — ATORVASTATIN CALCIUM 20 MG: 20 TABLET, FILM COATED ORAL at 08:23

## 2018-01-01 RX ADMIN — HYDROCODONE BITARTRATE AND ACETAMINOPHEN 1 TABLET: 5; 325 TABLET ORAL at 14:40

## 2018-01-01 RX ADMIN — FOLIC ACID 1 MG: 1 TABLET ORAL at 09:51

## 2018-01-01 RX ADMIN — ASPIRIN 325 MG: 325 TABLET ORAL at 09:46

## 2018-01-01 RX ADMIN — HYDROCODONE BITARTRATE AND ACETAMINOPHEN 1 TABLET: 5; 325 TABLET ORAL at 00:28

## 2018-01-01 RX ADMIN — CYANOCOBALAMIN TAB 500 MCG 500 MCG: 500 TAB at 08:04

## 2018-01-01 RX ADMIN — ASPIRIN 325 MG: 325 TABLET ORAL at 08:03

## 2018-01-01 RX ADMIN — METOPROLOL TARTRATE 25 MG: 25 TABLET ORAL at 21:34

## 2018-01-01 RX ADMIN — IPRATROPIUM BROMIDE AND ALBUTEROL SULFATE 3 ML: .5; 3 SOLUTION RESPIRATORY (INHALATION) at 23:08

## 2018-01-01 RX ADMIN — HYDROCODONE BITARTRATE AND ACETAMINOPHEN 1 TABLET: 5; 325 TABLET ORAL at 05:06

## 2018-01-01 RX ADMIN — APIXABAN 5 MG: 2.5 TABLET, FILM COATED ORAL at 22:07

## 2018-01-01 RX ADMIN — Medication 5 MG: at 21:28

## 2018-01-01 RX ADMIN — ASPIRIN 325 MG: 325 TABLET ORAL at 08:23

## 2018-01-01 RX ADMIN — DULOXETINE HYDROCHLORIDE 30 MG: 30 CAPSULE, DELAYED RELEASE ORAL at 08:06

## 2018-01-01 RX ADMIN — ATORVASTATIN CALCIUM 20 MG: 20 TABLET, FILM COATED ORAL at 08:19

## 2018-01-01 RX ADMIN — ASPIRIN 325 MG: 325 TABLET ORAL at 16:14

## 2018-01-01 RX ADMIN — HYDROCODONE BITARTRATE AND ACETAMINOPHEN 1 TABLET: 5; 325 TABLET ORAL at 14:51

## 2018-01-01 RX ADMIN — ATORVASTATIN CALCIUM 20 MG: 20 TABLET, FILM COATED ORAL at 07:42

## 2018-01-01 RX ADMIN — Medication 5 MG: at 21:08

## 2018-01-01 RX ADMIN — METOPROLOL TARTRATE 25 MG: 25 TABLET ORAL at 21:42

## 2018-01-01 RX ADMIN — Medication 2.2 MCG/KG/MIN: at 13:59

## 2018-01-01 RX ADMIN — METOPROLOL TARTRATE 25 MG: 25 TABLET ORAL at 21:10

## 2018-01-01 RX ADMIN — CYANOCOBALAMIN TAB 500 MCG 500 MCG: 500 TAB at 07:25

## 2018-01-01 RX ADMIN — POTASSIUM CHLORIDE, DEXTROSE MONOHYDRATE AND SODIUM CHLORIDE 100 ML/HR: 150; 5; 900 INJECTION, SOLUTION INTRAVENOUS at 23:28

## 2018-01-01 RX ADMIN — HYDROCODONE BITARTRATE AND ACETAMINOPHEN 1 TABLET: 5; 325 TABLET ORAL at 21:33

## 2018-01-01 RX ADMIN — LIDOCAINE 2 PATCH: 50 PATCH CUTANEOUS at 02:45

## 2018-01-01 RX ADMIN — ENOXAPARIN SODIUM 60 MG: 60 INJECTION SUBCUTANEOUS at 21:16

## 2018-01-01 RX ADMIN — TAZOBACTAM SODIUM AND PIPERACILLIN SODIUM 3.38 G: 375; 3 INJECTION, SOLUTION INTRAVENOUS at 06:01

## 2018-01-01 RX ADMIN — GADOBENATE DIMEGLUMINE 10 ML: 529 INJECTION, SOLUTION INTRAVENOUS at 13:02

## 2018-01-01 RX ADMIN — DULOXETINE HYDROCHLORIDE 30 MG: 30 CAPSULE, DELAYED RELEASE ORAL at 08:04

## 2018-01-01 RX ADMIN — HYDROCODONE BITARTRATE AND ACETAMINOPHEN 1 TABLET: 5; 325 TABLET ORAL at 22:00

## 2018-01-01 RX ADMIN — MIRTAZAPINE 7.5 MG: 15 TABLET, FILM COATED ORAL at 22:01

## 2018-01-01 RX ADMIN — METOPROLOL TARTRATE 25 MG: 25 TABLET ORAL at 08:23

## 2018-01-01 RX ADMIN — HEPARIN SODIUM 27 UNITS/KG/HR: 10000 INJECTION, SOLUTION INTRAVENOUS at 14:06

## 2018-01-01 RX ADMIN — METOPROLOL TARTRATE 25 MG: 25 TABLET ORAL at 07:38

## 2018-01-01 RX ADMIN — NYSTATIN 500000 UNITS: 100000 SUSPENSION ORAL at 17:37

## 2018-01-01 RX ADMIN — HEPARIN SODIUM 18 UNITS/KG/HR: 10000 INJECTION, SOLUTION INTRAVENOUS at 22:14

## 2018-01-01 RX ADMIN — ENOXAPARIN SODIUM 50 MG: 60 INJECTION SUBCUTANEOUS at 07:26

## 2018-01-01 RX ADMIN — SODIUM CHLORIDE, POTASSIUM CHLORIDE, SODIUM LACTATE AND CALCIUM CHLORIDE 30 ML/HR: 600; 310; 30; 20 INJECTION, SOLUTION INTRAVENOUS at 13:51

## 2018-01-01 RX ADMIN — HYDROCODONE BITARTRATE AND ACETAMINOPHEN 1 TABLET: 5; 325 TABLET ORAL at 21:51

## 2018-01-01 RX ADMIN — NYSTATIN 500000 UNITS: 500000 SUSPENSION ORAL at 12:24

## 2018-01-01 RX ADMIN — NYSTATIN 500000 UNITS: 100000 SUSPENSION ORAL at 08:11

## 2018-01-01 RX ADMIN — RALOXIFENE HYDROCHLORIDE 60 MG: 60 TABLET, FILM COATED ORAL at 08:32

## 2018-01-01 RX ADMIN — FOLIC ACID 1 MG: 1 TABLET ORAL at 07:56

## 2018-01-01 RX ADMIN — Medication 5 MG: at 21:01

## 2018-01-01 RX ADMIN — METOPROLOL TARTRATE 25 MG: 25 TABLET ORAL at 21:41

## 2018-01-01 RX ADMIN — ENOXAPARIN SODIUM 60 MG: 60 INJECTION SUBCUTANEOUS at 11:26

## 2018-01-01 RX ADMIN — HYDROCODONE BITARTRATE AND ACETAMINOPHEN 1 TABLET: 5; 325 TABLET ORAL at 17:46

## 2018-01-01 RX ADMIN — ENOXAPARIN SODIUM 50 MG: 60 INJECTION SUBCUTANEOUS at 08:04

## 2018-01-01 RX ADMIN — Medication 2 MCG/KG/MIN: at 06:53

## 2018-01-01 RX ADMIN — FOLIC ACID 1 MG: 1 TABLET ORAL at 08:03

## 2018-01-01 RX ADMIN — FOLIC ACID 1 MG: 1 TABLET ORAL at 09:28

## 2018-01-01 RX ADMIN — ATORVASTATIN CALCIUM 20 MG: 20 TABLET, FILM COATED ORAL at 07:40

## 2018-01-01 RX ADMIN — LIDOCAINE 3 PATCH: 50 PATCH CUTANEOUS at 07:38

## 2018-01-01 RX ADMIN — ACETAMINOPHEN 650 MG: 325 TABLET, FILM COATED ORAL at 15:17

## 2018-01-01 RX ADMIN — DOCUSATE SODIUM 100 MG: 100 CAPSULE, LIQUID FILLED ORAL at 09:09

## 2018-01-01 RX ADMIN — HYDROMORPHONE HYDROCHLORIDE 1 MG: 2 INJECTION INTRAMUSCULAR; INTRAVENOUS; SUBCUTANEOUS at 18:51

## 2018-01-01 RX ADMIN — ACETAMINOPHEN 650 MG: 325 TABLET, FILM COATED ORAL at 07:25

## 2018-01-03 PROBLEM — I63.9 CARDIOEMBOLIC STROKE (HCC): Status: ACTIVE | Noted: 2018-01-01

## 2018-01-03 PROBLEM — E78.5 DYSLIPIDEMIA: Chronic | Status: ACTIVE | Noted: 2018-01-01

## 2018-01-03 PROBLEM — J98.11 ATELECTASIS: Status: ACTIVE | Noted: 2018-01-01

## 2018-01-03 PROBLEM — I50.32 DIASTOLIC CHF, CHRONIC (HCC): Chronic | Status: ACTIVE | Noted: 2018-01-01

## 2018-01-03 PROBLEM — I35.1 AORTIC INSUFFICIENCY: Chronic | Status: ACTIVE | Noted: 2018-01-01

## 2018-01-03 PROBLEM — R47.01 EXPRESSIVE APHASIA: Status: ACTIVE | Noted: 2018-01-01

## 2018-01-03 PROBLEM — I34.0 MITRAL REGURGITATION: Chronic | Status: ACTIVE | Noted: 2018-01-01

## 2018-01-04 PROBLEM — I63.9 CVA (CEREBRAL VASCULAR ACCIDENT) (HCC): Status: ACTIVE | Noted: 2018-01-01

## 2018-01-23 PROBLEM — C79.51 BONE METASTASIS: Status: ACTIVE | Noted: 2018-01-01

## 2018-01-23 PROBLEM — C34.30 LUNG CANCER, LOWER LOBE (HCC): Status: ACTIVE | Noted: 2018-01-01

## 2018-01-23 NOTE — PROGRESS NOTES
Physician Weekly Management Note    Diagnosis:     Diagnosis Plan   1. Bone metastasis         RT Details:  Treatment #5/10 - left iliac wing.    Adenosquamous CA right lower lobe    Notes on Treatment course, Films, Medical progress:  No difficulties with her course of therapy as far.  No real change in her symptoms as yet.  Continue as planned.    Weekly Management:  Medication reviewed?   Yes  New medications given?   No  Problemlist reviewed?   Yes  Problem added?   No      Technical aspects reviewed:  Weekly OBI approved?   Yes  Weekly port films approved?   Yes  Change requests noted on port film?   No  Patient setup and plan reviewed?   Yes    Chart Reviewed:  Continue current treatment plan?   Yes  Treatment plan change requested?   No  CBC reviewed?   No  Concurrent Chemo?   No    Objective     Toxicities:    None     Review of Systems   As above    There were no vitals filed for this visit.    No flowsheet data found.    Physical Exam  As above      Problem Summary List    Diagnosis:     Diagnosis Plan   1. Bone metastasis       Pathology:       Past Medical History:   Diagnosis Date   • Dense breast tissue    • Depression    • History of radiation therapy    • Hyperlipidemia    • Laryngeal cancer     Larynx - Radiation         Past Surgical History:   Procedure Laterality Date   •  SECTION PRIMARY  1995   • CHOLECYSTECTOMY           Current Facility-Administered Medications on File Prior to Visit   Medication Dose Route Frequency Provider Last Rate Last Dose   • acetaminophen (TYLENOL) tablet 650 mg  650 mg Nasogastric Q4H PRN Zander Castillo MD   650 mg at 18 0933   • aspirin tablet 325 mg  325 mg Nasogastric Daily Zander Castillo MD   325 mg at 18 0811   • atorvastatin (LIPITOR) tablet 20 mg  20 mg Nasogastric Daily Zander Castillo MD   20 mg at 18 0811   • bisacodyl (DULCOLAX) suppository 10 mg  10 mg Rectal Daily PRN Eduard Gordon,  MD       • docusate sodium (COLACE) liquid 100 mg  100 mg Nasogastric BID PRN Eduard Gordon MD       • enoxaparin (LOVENOX) syringe 60 mg  1 mg/kg Subcutaneous Q12H Zander Castillo MD   60 mg at 01/23/18 1233   • folic acid (FOLVITE) tablet 1 mg  1 mg Nasogastric Daily Zander Castillo MD   1 mg at 01/23/18 0812   • HYDROcodone-acetaminophen (NORCO) 5-325 MG per tablet 1 tablet  1 tablet Oral Q4H PRN Eduard Gordon MD   1 tablet at 01/22/18 2147   • lidocaine (LIDODERM) 5 % 3 patch  3 patch Transdermal Q24H Eduard Gordon MD   2 patch at 01/23/18 0812   • magnesium hydroxide (MILK OF MAGNESIA) suspension 2400 mg/10mL 10 mL  10 mL Nasogastric Daily PRN Zander Castillo MD       • melatonin tablet 5 mg  5 mg Nasogastric Nightly Zander Castillo MD   5 mg at 01/22/18 2148   • metoprolol tartrate (LOPRESSOR) tablet 25 mg  25 mg Nasogastric Q12H Zander Castillo MD   25 mg at 01/22/18 2148   • [START ON 1/29/2018] mirtazapine (REMERON) tablet 15 mg  15 mg Nasogastric Nightly Zander Castillo MD       • mirtazapine (REMERON) tablet 7.5 mg  7.5 mg Nasogastric Nightly Zander Castillo MD   7.5 mg at 01/22/18 2148   • ondansetron ODT (ZOFRAN-ODT) disintegrating tablet 4 mg  4 mg Oral Q6H PRN Eduard Gordon MD       • polyethylene glycol 3350 powder (packet)  17 g Nasogastric Daily PRN Zander Castillo MD       • sodium chloride 0.9 % flush 1-10 mL  1-10 mL Intravenous PRN Eduard Gordon MD       • vitamin B-12 (CYANOCOBALAMIN) tablet 500 mcg  500 mcg Nasogastric Daily Zander Castillo MD   500 mcg at 01/23/18 0812   • [DISCONTINUED] enoxaparin (LOVENOX) syringe 60 mg  1 mg/kg Subcutaneous Q12H Zeinab Cleveland MD   60 mg at 01/23/18 0227   • [DISCONTINUED] heparin 41825 units/250 mL (100 units/mL) in 0.45 % NaCl infusion  12 Units/kg/hr Intravenous Titrated Rodolfo Joy MD   24 Units/kg/hr at 01/22/18 0105   • [DISCONTINUED] nystatin  (MYCOSTATIN) 251870 UNIT/ML suspension 500,000 Units  5 mL Oral 4x Daily Zander Davis Jr., MD   500,000 Units at 01/22/18 1131     Current Outpatient Prescriptions on File Prior to Visit   Medication Sig Dispense Refill   • atorvastatin (LIPITOR) 10 MG tablet Take 10 mg by mouth Daily.     • DULoxetine (CYMBALTA) 30 MG capsule 90 mg Daily.     • raloxifene (EVISTA) 60 MG tablet Take 60 mg by mouth Daily.         No Known Allergies    Primary care MD:    Peng Murdock MD    Oncologist:   SANDRA Heck M.D.    Seen and approved by:  Ezekiel Naqvi MD  01/23/2018

## 2018-01-28 ENCOUNTER — INPATIENT HOSPITAL (OUTPATIENT)
Dept: URBAN - METROPOLITAN AREA HOSPITAL 113 | Facility: HOSPITAL | Age: 58
End: 2018-01-28

## 2018-01-28 DIAGNOSIS — R47.01 APHASIA: ICD-10-CM

## 2018-01-28 DIAGNOSIS — I63.9 CEREBRAL INFARCTION, UNSPECIFIED: ICD-10-CM

## 2018-01-28 PROCEDURE — 99231 SBSQ HOSP IP/OBS SF/LOW 25: CPT | Performed by: INTERNAL MEDICINE

## 2018-01-29 PROBLEM — R13.12 OROPHARYNGEAL DYSPHAGIA: Status: ACTIVE | Noted: 2018-01-01

## 2018-01-29 NOTE — ANESTHESIA POSTPROCEDURE EVALUATION
Patient: Masha Porter    Procedure Summary     Date Anesthesia Start Anesthesia Stop Room / Location    01/29/18 1507 1546  [facility]       Procedure Diagnosis Surgeon Provider    ESOPHAGOGASTRODUODENOSCOPY WITH 20F PERCUTANEOUS ENDOSCOPIC GASTROSTOMY TUBE INSERTION (N/A Esophagus) Oropharyngeal dysphagia  (Oropharyngeal dysphagia [R13.12]) MD Lara Muniz MD          Anesthesia Type: MAC  Last vitals  BP   127/75 (01/29/18 1550)   Temp   36.6 °C (97.8 °F) (01/29/18 1554)   Pulse   98 (01/29/18 1550)   Resp   16 (01/29/18 1550)     SpO2   96 % (01/29/18 1550)     Post Anesthesia Care and Evaluation    Patient location during evaluation: bedside  Patient participation: complete - patient participated  Level of consciousness: awake and alert  Pain management: adequate  Airway patency: patent  Anesthetic complications: No anesthetic complications  PONV Status: none  Cardiovascular status: acceptable  Respiratory status: acceptable  Hydration status: acceptable    Comments: /75 (BP Location: Right arm, Patient Position: Lying)  Pulse 98  Temp 36.6 °C (97.8 °F) (Oral)   Resp 16  SpO2 96%

## 2018-01-29 NOTE — NURSING NOTE
NOTIFIED DR. KING THAT PATIENT FAMILY STATED THAT DR. POWERS TOLD PATIENT/ PATIENT FAMILY PATIENT HAD TO BE OFF HEPARIN FOR 4 HOURS PRIOR TO PROCEDURE. PATIENT/ PATIENT FAMILY STATE THAT THEY THOUGHT PROCEDURE WOULD BE AT 2 PM TODAY R/T LAST DOSE OF HEPARIN THIS AM. DR. KING STATES HE WANTS TO WAIT 6 HOURS FROM TIME HEPARIN WAS STOPPED. PROCEDURE TO START AT 3 PM PER DR. KING.

## 2018-01-29 NOTE — PLAN OF CARE
Problem: GI Endoscopy (Adult)  Goal: Signs and Symptoms of Listed Potential Problems Will be Absent or Manageable (GI Endoscopy)  Outcome: Ongoing (interventions implemented as appropriate)   01/29/18 1341   GI Endoscopy   Problems Assessed (GI Endoscopy) pain;bleeding;hypoxia/hypoxemia   Problems Present (GI Endoscopy) none

## 2018-01-29 NOTE — ANESTHESIA PREPROCEDURE EVALUATION
Anesthesia Evaluation     Patient summary reviewed and Nursing notes reviewed   no history of anesthetic complications:  NPO Solid Status: > 8 hours  NPO Liquid Status: > 8 hours     Airway   Mallampati: I  TM distance: >3 FB  Neck ROM: full  no difficulty expected  Dental - normal exam   (+) edentulous    Pulmonary - normal exam   (+) a smoker Former,   (-) COPD, asthma    ROS comment: Current metastatic lung cancer  Cardiovascular - normal exam  Exercise tolerance: excellent (>7 METS)    ECG reviewed  Rhythm: regular  Rate: normal    (+) hypertension, valvular problems/murmurs MR, CHF, PVD, hyperlipidemia    ROS comment: Chronic diastolic CHF, stable now.    Neuro/Psych  (+) CVA residual symptoms, psychiatric history Depression,     (-) seizures, TIA    ROS Comment: Multiple CVA's over the past 4-6wks, now with expressive aphasia and limited right UE movement.  GI/Hepatic/Renal/Endo - negative ROS   (-) hepatitis, liver disease, no renal disease, diabetes, hypothyroidism    Musculoskeletal (-) negative ROS    Abdominal  - normal exam   Substance History - negative use     OB/GYN negative ob/gyn ROS         Other      history of cancer active                                            Anesthesia Plan    ASA 3     MAC     intravenous induction   Anesthetic plan and risks discussed with patient and spouse/significant other.    Plan discussed with CRNA and attending.

## 2018-01-30 NOTE — PROGRESS NOTES
Physician Weekly Management Note    Diagnosis:     Diagnosis Plan   1. Bone metastasis         RT Details:  Treatment #10/10 - left iliac wing.    Notes on Treatment course, Films, Medical progress:  Some improvement in her symptoms, no treatment-related issues or side effects.  No follow-up in our department.    Weekly Management:  Medication reviewed?   Yes  New medications given?   No  Problemlist reviewed?   Yes  Problem added?   No      Technical aspects reviewed:  Weekly OBI approved?   Yes  Weekly port films approved?   Yes  Change requests noted on port film?   No  Patient setup and plan reviewed?   Yes    Chart Reviewed:  Continue current treatment plan?   Yes  Treatment plan change requested?   No  CBC reviewed?   No  Concurrent Chemo?   No    Objective     Toxicities:   As above     Review of Systems   As above    There were no vitals filed for this visit.    No flowsheet data found.    Physical Exam  As above      Problem Summary List    Diagnosis:     Diagnosis Plan   1. Bone metastasis       Pathology:       Past Medical History:   Diagnosis Date   • Dense breast tissue    • Depression    • History of radiation therapy    • Hyperlipidemia    • Laryngeal cancer     Larynx - Radiation   • Stroke          Past Surgical History:   Procedure Laterality Date   •  SECTION PRIMARY  1995   • CHOLECYSTECTOMY     • ENDOSCOPY W/ PEG TUBE PLACEMENT N/A 2018    Procedure: ESOPHAGOGASTRODUODENOSCOPY WITH 20F PERCUTANEOUS ENDOSCOPIC GASTROSTOMY TUBE INSERTION;  Surgeon: Kalin Castillo MD;  Location: [facility] END[facility]COPY;  Service:          Current Facility-Administered Medications on File Prior to Visit   Medication Dose Route Frequency Provider Last Rate Last Dose   • acetaminophen (TYLENOL) tablet 650 mg  650 mg Nasogastric Q4H PRN Zander Castillo MD   650 mg at 18 0803   • aspirin tablet 325 mg  325 mg Nasogastric Daily Zander Castillo MD   325 mg at 18 0803   •  atorvastatin (LIPITOR) tablet 20 mg  20 mg Nasogastric Daily Zander Castillo MD   20 mg at 18 0803   • bisacodyl (DULCOLAX) suppository 10 mg  10 mg Rectal Daily PRN Eduard Gordon MD       • [COMPLETED] ceFAZolin (ANCEF) IVPB 1 g  1 g Intravenous Once Kalin Castillo MD   1 g at 18 1522   • [] dextrose 5 % and sodium chloride 0.9 % with KCl 20 mEq/L infusion  100 mL/hr Intravenous Continuous Zander Castillo  mL/hr at 18 2328 100 mL/hr at 18 2328   • docusate sodium (COLACE) liquid 100 mg  100 mg Nasogastric BID PRN Eduard Gordon MD       • enoxaparin (LOVENOX) syringe 60 mg  60 mg Subcutaneous Q12H Zander Castillo MD       • folic acid (FOLVITE) tablet 1 mg  1 mg Nasogastric Daily aZnder Castillo MD   1 mg at 18 0803   • heparin 48902 units/250 mL (100 units/mL) in 0.45 % NaCl infusion  18 Units/kg/hr Intravenous Titrated Zander Castillo MD 14.06 mL/hr at 18 1247 26 Units/kg/hr at 18 1247   • HYDROcodone-acetaminophen (NORCO) 5-325 MG per tablet 1 tablet  1 tablet Oral Q4H PRN Zander Castillo MD   1 tablet at 18 1946   • lidocaine (LIDODERM) 5 % 3 patch  3 patch Transdermal Q24H Eduard Gordon MD   3 patch at 18 0801   • magnesium hydroxide (MILK OF MAGNESIA) suspension 2400 mg/10mL 10 mL  10 mL Nasogastric Daily PRN Zander Castillo MD       • melatonin tablet 5 mg  5 mg Nasogastric Nightly Zander Castillo MD   5 mg at 18 2221   • mirtazapine (REMERON) tablet 15 mg  15 mg Nasogastric Nightly Zander Castillo MD   15 mg at 18 2221   • nystatin (MYCOSTATIN) 688599 UNIT/ML suspension 500,000 Units  5 mL Oral 4x Daily Zander Castillo MD   500,000 Units at 18 1124   • ondansetron ODT (ZOFRAN-ODT) disintegrating tablet 4 mg  4 mg Oral Q6H PRN Eduard Gordon MD       • Pharmacy Consult - Pharmacy to dose   Does not apply Continuous PRN Zander Candelaria  MD Jonathan       • polyethylene glycol 3350 powder (packet)  17 g Nasogastric Daily PRN Zander Castillo MD   17 g at 01/30/18 0803   • sodium chloride 0.9 % flush 1-10 mL  1-10 mL Intravenous PRN Eduard Gordon MD       • vitamin B-12 (CYANOCOBALAMIN) tablet 500 mcg  500 mcg Nasogastric Daily Zander Castillo MD   500 mcg at 01/30/18 0803   • [DISCONTINUED] heparin (porcine) 5000 UNIT/ML injection 2,200-4,300 Units  40-80 Units/kg Intravenous Q6H PRN Zeinab Cleveland MD   4,300 Units at 01/30/18 0517   • [DISCONTINUED] lactated ringers infusion  30 mL/hr Intravenous Continuous Kalin Castillo MD   Stopped at 01/29/18 1600   • [DISCONTINUED] Propofol (DIPRIVAN) injection   Intravenous PRN Lara Lim MD   120 mg at 01/29/18 1521   • [DISCONTINUED] Propofol (DIPRIVAN) injection    Continuous PRN Lara Lim MD   Stopped at 01/29/18 1539     Current Outpatient Prescriptions on File Prior to Visit   Medication Sig Dispense Refill   • atorvastatin (LIPITOR) 10 MG tablet Take 10 mg by mouth Daily.     • DULoxetine (CYMBALTA) 30 MG capsule 90 mg Daily.     • raloxifene (EVISTA) 60 MG tablet Take 60 mg by mouth Daily.         No Known Allergies     Primary care MD:    Peng Murdock MD    Oncologist:  SHAYY Joy M.D.    Seen and approved by:  Ezekiel Naqvi MD  01/30/2018

## 2018-02-01 NOTE — PROGRESS NOTES
Case Management  Inpatient Rehabilitation Team Conference    Conference Date/Time: 2018 7:31:41 AM    Team Conference Attendees:  Alex Penn MSSW Mary Dean, PT  Justo Henry, OT  Malia Turner, SLP  Malia aMrtinez, CTRS  Elizabeth Elias, RN    Demographics            Age: 57Y            Gender: Female    Admission Date: 1/3/2018 2:39:00 PM  Rehabilitation Diagnosis:  CVA right pascual  Past Medical History: Dyslipidemia; ; throat cancer 2005 - XRT;      Plan of Care  Anticipated Discharge Date/Estimated Length of Stay: [facility]OS: DC 2/2  Anticipated Discharge Destination: Community discharge with assistance  Discharge Plan : Family conference held 2018.  has attended family  teaching.  Referral made to [facility] for PT,OT,ST and skilled  nursing.  Medical Necessity Expected Level Rationale: MIN  Intensity and Duration: an average of 3 hours/5 days per week  Medical Supervision and 24 Hour Rehab Nursing: x  Physical Therapy: x  PT Intensity/Duration: 60 minutes/day, 5 days/week  Occupational Therapy: x  OT Intensity/Duration: 60 minutes/day, 5 days/week  Speech and Language Therapy: x  SLP Intensity/Duration: 60 minutes/day, 5 days/week  Social Work: x  Therapeutic Recreation: x  Psychology: x  Updated (if changes indicated)    Anticipated Discharge Date/Estimated Length of Stay:   ELOS: DC 2/2    Based on the patient's medical and functional status, their prognosis and  expected level of functional improvement is: MAX      Interdisciplinary Problem/Goals/Status    All Rehab Problems:  Communication    [RN] Expression(Active)  Current Status(2018): Pt has global aphasia, nonverbal  Weekly Goal(2018): Pt will make needs know via gestures  Discharge Goal: Pt will make needs known.    [ST] Comprehension(Active)  Current Status(2018): poor ability to follow 1 step commands- needs visual  cues/tactile cues; yes/no responses  slighlty better for personal questions, but  still inconsistently accurate for general info  Weekly Goal(02/03/2018): Pt will answer simple yes/no questions with min cues.  Discharge Goal: Pt will answer complex y/n questions with 80% accuracy.    [ST] Expression(Active)  Current Status(01/31/2018): producing some words with improved  clarity/intelligibility- songs, rhymes, auto speech  Weekly Goal(02/03/2018): complete auto speech tasks indep  Discharge Goal:  ability to convey simple nees/wants with mod assist        Mobility    [PT] Bed/Chair/Wheelchair(Active)  Current Status(01/31/2018): CGA  Weekly Goal(02/02/2018): CGA  Discharge Goal: CGA    [PT] Car Transfers(Active)  Current Status(01/31/2018): Min A  Weekly Goal(02/02/2018): PT only  Discharge Goal: CGA    [PT] Stairs(Active)  Current Status(01/31/2018): 4 step, Min A, 1 HR  Weekly Goal(02/02/2018): PT only  Discharge Goal: 4 steps, Min A, 1 HR    [PT] Walk(Active)  Current Status(01/31/2018): 80` Min A, HHA  Weekly Goal(02/02/2018): PT only  Discharge Goal: 80` CGA    [OT] Toilet Transfers(Active)  Current Status(01/31/2018): Min  Weekly Goal(02/02/2018): CGA  Discharge Goal: CGA    [OT] Tub/Shower Transfers(Active)  Current Status(01/31/2018): Min  Weekly Goal(02/02/2018): CGA  Discharge Goal: CGA        Psychosocial    [RN] Coping/Adjustment(Active)  Current Status(01/24/2018): Pt is alert but communication verbally is  significantly impaired. New stroke, new cancer dx.  very supportive.  Weekly Goal(02/01/2018): Identify progress in functional status.  Discharge Goal: Adequate coping regarding life changes with ongoing support for  pt and .        Safety    [RN] Potential for Injury(Active)  Current Status(01/24/2018): Pt has global aphasia and is at risk for injury  Weekly Goal(01/31/2018): Pt will use call bell  Discharge Goal: No injury        Self Care    [OT] Bathing(Active)  Current Status(01/31/2018): Dep/max  Weekly  Goal(02/02/2018): mod  Discharge Goal: Mod    [OT] Dressing (Lower)(Active)  Current Status(01/31/2018): Dep  Weekly Goal(02/02/2018): Max  Discharge Goal: max    [OT] Dressing (Upper)(Active)  Current Status(01/31/2018): MOD  Weekly Goal(02/02/2018): MIN  Discharge Goal: Min    [OT] Eating(Active)  Current Status(01/01/1753):  Weekly Goal(01/01/1753):  Discharge Goal:    [OT] Grooming(Active)  Current Status(01/31/2018): Max/Dep  Weekly Goal(02/02/2018): Mod  Discharge Goal: Mod    [OT] Toileting(Active)  Current Status(01/31/2018): Dep  Weekly Goal(02/02/2018): Max  Discharge Goal: Max        Sphincter Control    [RN] Bladder Management(Active)  Current Status(01/24/2018): Continent of bladder except has had 2 episodes  incont. per report.  Weekly Goal(02/01/2018): 100% cont  Discharge Goal: 100% cont    [RN] Bowel Management(Active)  Current Status(01/24/2018): cont bowel with constipation  Weekly Goal(01/31/2018): cont 100%, with movement q1-3 days  Discharge Goal: cont 100%        Swallow Function    [ST] Swallowing(Active)  Current Status(01/31/2018):  VFSS- 1/25- NPO; now w/PEG  Weekly Goal(02/02/2018):  Discharge Goal: safe swallow- least restrictive diet        Comments: 1/5: significant right neglect; not following commands; yes/no  responses not reliable;  repeat VFSS today;    1/11: not using assistive device for ambulation due to cognitive deficits; still  with right neglect; diet downgraded after VFSS; has yes bias; lung biopsy  yesterday, now positive for DVT;    1/18: new strokes since last team conference, with increased physical,  cognitive, and speech/swallowing deficits; tearful at times - increased insight  into cancer diagnosis/prognosis?; team to initiate teaching with spouse;    1/25: doing well when compared to last week; spouse involved with pain  management - trying to keep patient from being too fatigued with therapies;  perseverative with ADLs, needs hands-on cues; tearful, more  depressed per NSG;  VFSS today - PEG to follow?; finished with chemo next Tuesday;    2/1: desats with PT yesterday, MD to order overnight pulse-ox, anticipate need  for O2 at home; taking tylenol during day and norco at bedtime;    Signed by: Emir Nevarez RN    Physician CoSigned By: Eduard Gordon 02/01/2018 08:04:27

## 2018-02-01 NOTE — PROGRESS NOTES
SECTION GG      Self Care Performance Discharge:   Oral Hygiene: [place] does more than half the effort. [place] lifts or holds  trunk or limbs and provides more than half the effort.   Toileting Hygiene: : [place] does all of the effort. Patient does none of the  effort to complete the activity. Or, the assistance of 2 or more helpers is  required for the patient to complete the activity.   Shower/Bathe Self: [place] does more than half the effort. [place] lifts or holds  trunk or limbs and provides more than half the effort.   Upper Body Dressing: [place] does less than half the effort. [place] lifts, holds  or supports trunk or limbs but provides less than half the effort.   Lower Body Dressing: [place] does all of the effort. Patient does none of the  effort to complete the activity. Or, the assistance of 2 or more helpers is  required for the patient to complete the activity.   Putting On/Taking Off Footwear: [place] does all of the effort. Patient does  none of the effort to complete the activity. Or, the assistance of 2 or more  helpers is required for the patient to complete the activity.    Mobility Toilet Transfer Discharge: [place] does less than half the effort.  [place] lifts, holds or supports trunk or limbs but provides less than half the  effort.    Signed by: Justo Henry OTR/SHANDA

## 2018-02-01 NOTE — THERAPY DISCHARGE NOTE
Inpatient Rehabilitation - Occupational Therapy Treatment Note/Discharge  [facility]     Patient Name: Masha Porter  : 1960  MRN: [id number]  Today's Date: 2018  Onset of Illness/Injury or Date of Surgery Date: 17  Date of Referral to OT: 18  Referring Physician: Heidi      Admit Date: 1/3/2018    Visit Dx:     ICD-10-CM ICD-9-CM   1. Primary lung cancer, left C34.92 162.9   2. Oropharyngeal dysphagia R13.12 787.22     Patient Active Problem List   Diagnosis   • Expressive aphasia   • Dyslipidemia   • Cardioembolic stroke   • Diastolic CHF, chronic   • Aortic insufficiency   • Mitral regurgitation   • Atelectasis   • CVA (cerebral vascular accident)   • Lung cancer, lower lobe   • Bone metastasis   • Oropharyngeal dysphagia             Adult Rehabilitation Note       18 1625 18 1030 18 0916    Rehab Assessment/Intervention    Discipline occupational therapist  -DN speech language pathologist  -SL physical therapist  -MD    Document Type therapy note (daily note);discharge summary  -DN therapy note (daily note)  -SL therapy note (daily note);discharge summary  -MD    Subjective Information agree to therapy;no complaints  -DN no complaints;agree to therapy  -SL no complaints;agree to therapy  -MD    Patient Effort, Rehab Treatment good  -DN good  -SL good  -MD    Symptoms Noted During/After Treatment none  -DN none  -SL none  -MD    Precautions/Limitations NPO;fall precautions  -DN NPO;fall precautions  -SL fall precautions  -MD    Patient Response to Treatment more alert and endurance today  -DN performed much better today in tx- alert, awake for both sessions- not as fatiqued  -SL     Equipment Issued to Patient   gait belt  -MD    Recorded by [DN] Justo Henry, OTR [SL] Malia Turner, MS CCC-SLP [MD] Lara White, PT    Vital Signs    Pre SpO2 (%)   92   w 2L O2 97%  -MD    O2 Delivery Pre Treatment   room air  -MD    Intra SpO2 (%)   83   w 2L O2 94% after ambulating  up and down 4 steps  -MD    O2 Delivery Intra Treatment   room air  -MD    Post SpO2 (%)   88  -MD    O2 Delivery Post Treatment   room air  -MD    Pre Patient Position   Sitting  -MD    Intra Patient Position   Sitting   following 80` of ambulation  -MD    Post Patient Position   Sitting  -MD    Recovery Time   approx 30 sec  -MD    Recorded by   [MD] Lara White PT    Pain Assessment    Pain Assessment No/denies pain  -DN  No/denies pain  -MD    Recorded by [DN] SABRINA Smith  [MD] Lara White PT    Vision Assessment/Intervention    Visual Impairment inattention to the right;right neglect;visual impairment, right  -DN  visual impairment, right  -MD    Visual Field   right visual field impairment  -MD    Recorded by [JOSE ALBERTO] SABRINA Smith  [MD] Lara White PT    Cognitive Assessment/Intervention    Current Cognitive/Communication Assessment impaired  -DN impaired  -SL impaired  -MD    Orientation Status oriented to;person;place  -DN oriented to;person;place  -SL oriented to;person  -MD    Follows Commands/Answers Questions able to follow single-step instructions;50% of the time;needs increased time;needs cueing;needs repetition  -DN needs increased time;needs cueing  -SL 75% of the time;needs cueing;needs increased time;needs repetition  -MD    Personal Safety mild impairment;decreased awareness, need for assist;decreased awareness, need for safety;decreased insight to deficits;unaware of cognitive deficits;unaware of consequences of deficits;unaware of functional deficits  -DN  decreased insight to deficits  -MD    Personal Safety Interventions gait belt;fall prevention program maintained;supervised activity  -DN  fall prevention program maintained;gait belt;muscle strengthening facilitated;nonskid shoes/slippers when out of bed;supervised activity  -MD    Recorded by [DN] SABRINA Smith [SL] Malia Turner MS CCC-SLP [MD] Lara White PT    Improve ability to comprehend words/phrases/sentences    Improve ability  to comprehend words/phrases/sentences through:  identify pictures, field of  -SL     Ability to Comprehend Words/Phrases/Sentences Progress  60%;70%;with inconsistent cues  -SL     Recorded by  [SL] Malia Turner MS CCC-SLP     Improve word retrieval skills    Comments: Improve word retrieval skills  100% for aut  tasks with min cues from slo ( needed for initiation and intermittently during longer production of months)- improved intelligibility of stimulus  -SL     Recorded by  [SL] Malia Turner MS CCC-SLP     Improve motor planning    Improve motor planning to reduce apraxia by:  imitating mouth postures;imitating vowels  -SL     Motor Planning Progress  80%;with inconsistent cues  -SL     Recorded by  [SL] Malia Turner MS CCC-SLP     Improve planning and execution of connected speech    Planning and Execution of Connected Speech Progress  50%;with inconsistent cues   response to ?- [facility], level 1  -     Comments: Improve planning and execution of connected speech  100% for production of words/phrases in unison with slp using melodic intonation; imporved clarity of productions; noted improved spont production of stimulus after slp asked question-  -SL     Recorded by  [SL] Malia Turner MS CCC-SLP     General UE Assessment    ROM shoulder, right: UE ROM deficit;scapula, right: UE ROM deficit;elbow/forearm, right: UE ROM deficit;wrist, right: UE ROM deficit  -DN      ROM Detail AROM as follows for RUE (supine) shoulder flex /abduction 3/4, ER 1/2, elbow 3/4, forearm supinaton 7/8, Wrist 1/4, fingers 1/2, thumb trace.  -DN      Recorded by [DN] SABRINA Smith      MMT (Manual Muscle Testing)    General MMT Assessment upper extremity strength deficits identified  -DN      General MMT Assessment Detail 2+ to 1/5 throughout shoulder stronger than hand,  and pinch unable to registar  -DN      Recorded by [JOSE ALBERTO] SABRINA Smith      Bed Mobility, Assessment/Treatment    Bed Mobility, Roll Left, [place]   verbal cues  required;contact guard assist  -MD    Bed Mobility, Roll Right, [place]   verbal cues required;minimum assist (75% patient effort)  -MD    Bed Mobility, Scoot/Bridge, [place]   supervision required;verbal cues required  -MD    Bed Mob, Supine to Sit, [place]   verbal cues required;nonverbal cues required (demo/gesture);minimum assist (75% patient effort)  -MD    Bed Mob, Sit to Supine, [place]   verbal cues required;minimum assist (75% patient effort)  -MD    Bed Mobility, Safety Issues   cognitive deficits limit understanding;decreased use of arms for pushing/pulling;decreased use of legs for bridging/pushing  -MD    Bed Mobility, Impairments   strength decreased;impaired balance;coordination impaired;motor control impaired  -MD    Bed Mobility, Comment   Pt up in chair when PT arrived this am.  Pt spouse assisted pt out of bed and to chair.  -MD    Recorded by   [MD] Lara White, PT    Transfer Assessment/Treatment    Transfers, Sit-Stand [place]   verbal cues required;contact guard assist;minimum assist (75% patient effort)  -MD    Transfers, Stand-Sit [place]   verbal cues required;contact guard assist;minimum assist (75% patient effort)  -MD    Transfers, Sit-Stand-Sit, Assist Device   other (see comments)   HHA  -MD    Toilet Transfer, [place] minimum assist (75% patient effort);verbal cues required;nonverbal cues required (demo/gesture)  -DN      Toilet Transfer, Assistive Device wheelchair;bedside commode without drop arms  -DN      Bathtub Transfer, [place] moderate assist (50% patient effort);verbal cues required;nonverbal cues required (demo/gesture);set up required  -DN      Bathtub Transfer, Assistive Device transfer tub bench  -DN      Transfer, Safety Issues weight-shifting ability decreased;step length decreased;sequencing ability decreased;balance decreased during turns  -DN  weight-shifting ability decreased;step length decreased;sequencing ability  decreased;balance decreased during turns  -MD    Transfer, Impairments impaired balance;strength decreased;coordination impaired;motor control impaired;muscle tone abnormal;impaired vision  -DN  strength decreased;impaired balance;coordination impaired;motor control impaired;pain  -MD    Transfer, Comment   car transfer: CGA  -MD    Recorded by [DN] Justo Henry OTR  [MD] Lara White PT    Gait Assessment/Treatment    Gait, [place] Level   verbal cues required;minimum assist (75% patient effort)  -MD    Gait, Assistive Device   other (see comments)   HHA  -MD    Gait, Distance (Feet)   80  -MD    Gait, Gait Deviations   bilateral:;luke decreased;forward flexed posture;left:;antalgic;right:;decreased heel strike;toe-to-floor clearance decreased  -MD    Gait, Safety Issues   step length decreased  -MD    Gait, Impairments   strength decreased;impaired balance;coordination impaired;motor control impaired  -MD    Recorded by   [MD] Lara White PT    Stairs Assessment/Treatment    Number of Stairs   4  -MD    Stairs, Handrail Location   none  -MD    Stairs, [place] Level   minimum assist (75% patient effort);contact guard assist;2 person assist required  -MD    Stairs, Technique Used   step to step (ascending);step to step (descending)  -MD    Stairs, Safety Issues   sequencing ability decreased  -MD    Stairs, Impairments   strength decreased;impaired balance;coordination impaired  -MD    Stairs, Comment   Practiced steps with spouse providing HHA, Min A and PT providing CGA due to first attempt and family teaching.  In PM spouse completed steps w/o CGA from PT.  Pt went up and down curb step w Min A  -MD    Recorded by   [MD] Lara White PT    Upper Body Bathing Assessment/Training    UB Bathing Assess/Train Assistive Device hand-held shower head;grab bars;tub bench  -DN      UB Bathing Assess/Train, Position sitting  -DN      UB Bathing Assess/Train, [place] Level set up required;verbal cues  required;nonverbal cues required (demo/gesture);hand over hand;moderate assist (50% patient effort)  -DN      UB Bathing Assess/Train, Impairments strength decreased;impaired vision;coordination impaired;motor control impaired  -DN      UB Bathing Assess/Train, Comment hand over hand to initate  -DN      Recorded by [DN] SABRINA Smith      Lower Body Bathing Assessment/Training    LB Bathing Assess/Train Assistive Device grab bars;hand-held shower head;tub bench  -DN      LB Bathing Assess/Train, Position sitting;standing  -DN      LB Bathing Assess/Train, [place] Level set up required;verbal cues required;nonverbal cues required (demo/gesture);hand over hand;dependent (less than 25% patient effort)  -DN      LB Bathing Assess/Train, Impairments postural control impaired;motor control impaired;coordination impaired;impaired balance;strength decreased;sensation decreased;impaired vision  -DN      Recorded by [DN] SABRINA Smith      Upper Body Dressing Assessment/Training    UB Dressing Assess/Train, Clothing Type doffing:;donning:;pull over;t-shirt  -DN      UB Dressing Assess/Train, Assist Device pascual technique  -DN      UB Dressing Assess/Train, Position sitting  -DN      UB Dressing Assess/Train, [place] verbal cues required;nonverbal cues required (demo/gesture);maximum assist (25% patient effort)  -DN      UB Dressing Assess/Train, Impairments impaired vision;motor control impaired;strength decreased  -DN      Recorded by [DN] SABRINA Smith      Lower Body Dressing Assessment/Training    LB Dressing Assess/Train, Clothing Type doffing:;donning:;pants;shoes;socks  -DN      LB Dressing Assess/Train, Position sitting;standing  -DN      LB Dressing Assess/Train, [place] dependent (less than 25% patient effort);verbal cues required;nonverbal cues required (demo/gesture)  -DN      LB Dressing Assess/Train, Impairments strength decreased;impaired balance;impaired vision  -DN      Recorded  by [JOES ALBERTO] SABRINA Smith      Toileting Assessment/Training    Toileting Assess/Train, Assistive Device bedside commode  -DN      Toileting Assess/Train, Position sitting;standing  -DN      Toileting Assess/Train, [place] Level dependent (less than 25% patient effort);verbal cues required;set up required;nonverbal cues required (demo/gesture)  -DN      Toileting Assess/Train, Impairments impaired balance;strength decreased;coordination impaired;motor control impaired;impaired vision  -DN      Recorded by [JOSE ALBERTO] SABRINA Smith      Grooming Assessment/Training    Grooming Assess/Train, Position sitting;sink side  -DN      Grooming Assess/Train, [place] Level set up required;verbal cues required;nonverbal cues required (demo/gesture);dependent (less than 25% patient effort)  -DN      Grooming Assess/Train, Impairments strength decreased;impaired vision;impaired balance  -DN      Recorded by [JOSE ALBERTO] SABRINA Smith      Balance Skills Training    Gait Balance-Level of Assistance   Minimum assistance  -MD    Gait Balance Support   Left upper extremity supported  -MD    Gait Balance Activities   uneven surface  -MD    Gait Balance # of Minutes   --   20`  -MD    Recorded by   [MD] Lara White, TJ    Therapy Exercises    Right Upper Extremity AAROM:;15 reps;supine;pronation/supination;shoulder abduction/adduction;shoulder circles;shoulder extension/flexion;shoulder ER/IR;shoulder horizontal abd/add;shoulder protraction/retraction  -DN      Recorded by [JOSE ALBERTO] SABRINA Smith      Functional Endurance    Detail (Functional Endurance) fair +  -DN      Recorded by [JOSE ALBERTO] SABRINA Smith      Neuromuscular Re-education    Neuromuscular Re-Ed Techniques Gross motor task- grasp/release;Facilitation isolated movement ([place]);Weight bearing RUE  -DN      Facilitation Isolated Movement right:;scapula;elbow;shoulder;forearm;wrist;finger;thumb  -DN      Recorded by [JOSE ALBERTO] SABRINA Smith      Sensory Assessment/Intervention     Sensory Impairment --   unable to assess due to aphasia  -DN      Recorded by [DN] SABRINA Smith      Positioning and Restraints    Pre-Treatment Position --   w/c  -DN  sitting in chair/recliner  -MD    Post Treatment Position bed  -DN  other  -MD    Other Position   return to room with caregiver  -MD    Recorded by [DN] SABRINA Smith  [MD] Lara White, PT      02/01/18 0841 01/31/18 1543 01/31/18 1449    Rehab Assessment/Intervention    Discipline speech language pathologist  -SL occupational therapist  -DN physical therapist  -MD    Document Type therapy note (daily note)  -SL therapy note (daily note)  -DN therapy note (daily note)  -MD    Subjective Information no complaints;agree to therapy  -SL agree to therapy;complains of;fatigue;pain  -DN agree to therapy;complains of;fatigue  -MD    Patient Effort, Rehab Treatment adequate  -SL  good  -MD    Symptoms Noted During/After Treatment  fatigue;shortness of breath;increased pain  -DN fatigue;shortness of breath  -MD    Symptoms Noted Comment   In am pt experienced SOA after walking 40ft.  O2 was 83% following a rest break of 2-3 min O2 was 88% RN was notified of these events.  -MD    Precautions/Limitations fall precautions;NPO  -SL fall precautions  -DN fall precautions  -MD    Specific Treatment Considerations nasal O2 in place  -SL      Equipment Issued to Patient   gait belt  -MD    Recorded by [SL] Malia Turner, MS CCC-SLP [DN] Justo Henry OTR [MD] Lara White, PT    Pain Assessment    Pain Assessment  Coelho-Xin WHATLEY  -DN No/denies pain  -MD    Coelho-Lauren FACES Pain Rating  4  -DN     Pain Score  4  -DN     Post Pain Score  4  -DN     Pain Type  Surgical pain  -DN     Pain Location  --   G tube sight  -DN     Pain Intervention(s)  Repositioned  -DN     Recorded by  [DN] SABRINA Smith [MD] Lara White, PT    Cognitive Assessment/Intervention    Current Cognitive/Communication Assessment impaired  -SL impaired  -DN impaired  -MD    Orientation  Status oriented to;person  -SL oriented to;person  -DN oriented to;person  -MD    Follows Commands/Answers Questions needs cueing;needs increased time  -SL 50% of the time;able to follow single-step instructions;needs cueing;needs increased time  -DN 75% of the time;needs cueing;needs repetition  -MD    Personal Safety  at risk behaviors demonstrated;decreased awareness, need for safety;decreased awareness, need for assist;decreased insight to deficits;unaware of functional deficits;unaware of consequences of deficits;unaware of cognitive deficits  -DN decreased insight to deficits  -MD    Personal Safety Interventions  gait belt;fall prevention program maintained  -DN fall prevention program maintained;gait belt;muscle strengthening facilitated;nonskid shoes/slippers when out of bed;supervised activity  -MD    Recorded by [SL] Malia Turner MS CCC-SLP [DN] Justo Henry OTR [MD] Lara White, PT    Improve ability to comprehend questions    Improve ability to comprehend questions: simple yes/no questions  -SL      Ability to Comprehend Questions Progress 60%;with inconsistent cues  -SL      Recorded by [SL] Malia Turner MS CCC-SLP      Improve word retrieval skills    Word Retrieval Skills Progress 10%;without cues;60%;with consistent cues;following model   naming items; phonemic cues/imitative production beneficial  -SL      Comments: Improve word retrieval skills 80% for sent completions with one word with added phonemic cues  -SL      Recorded by [SL] Malia Turner MS CCC-SLP      Improve planning and execution of connected speech    Planning and Execution of Connected Speech Progress 40%;with consistent cues   songs/rhymes w/cues form slp  -SL      Recorded by [SL] Malia Turner MS CCC-SLP      Bed Mobility, Assessment/Treatment    Bed Mob, Supine to Sit, [place]   not tested  -MD    Bed Mob, Sit to Supine, [place]   not tested  -MD    Recorded by   [MD] Lara White, PT    Transfer Assessment/Treatment    Transfers,  Chair-Bed [place]  moderate assist (50% patient effort);verbal cues required;set up required  -DN     Transfers, Sit-Stand [place]   verbal cues required;contact guard assist;minimum assist (75% patient effort)  -MD    Transfers, Stand-Sit [place]   verbal cues required;contact guard assist;minimum assist (75% patient effort)  -MD    Toilet Transfer, [place]  minimum assist (75% patient effort);verbal cues required;nonverbal cues required (demo/gesture)  -DN     Toilet Transfer, Assistive Device  wheelchair;bedside commode without drop arms  -DN     Transfer, Safety Issues  weight-shifting ability decreased;step length decreased;sequencing ability decreased;balance decreased during turns  -DN weight-shifting ability decreased;step length decreased;sequencing ability decreased;balance decreased during turns  -MD    Transfer, Impairments  strength decreased;impaired balance;coordination impaired;motor control impaired;pain  -DN strength decreased;impaired balance;coordination impaired;motor control impaired;muscle tone abnormal;impaired vision  -MD    Transfer, Comment   car transfer: Min A  -MD    Recorded by  [DN] Justo Henry OTR [MD] Lara White PT    Gait Assessment/Treatment    Gait, [place] Level   verbal cues required;minimum assist (75% patient effort)  -MD    Gait, Distance (Feet)   80   x1 and 40` x1  -MD    Gait, Gait Deviations   bilateral:;luke decreased;forward flexed posture;step length decreased;left:;antalgic;right:;decreased heel strike;toe-to-floor clearance decreased  -MD    Gait, Safety Issues   step length decreased;weight-shifting ability decreased  -MD    Gait, Impairments   strength decreased;impaired balance;coordination impaired;motor control impaired  -MD    Recorded by   [MD] Lara White PT    Upper Body Bathing Assessment/Training    UB Bathing Assess/Train, Position  sitting;sink side  -DN     UB Bathing Assess/Train, [place] Level  hand over  hand;maximum assist (25% patient effort);verbal cues required;nonverbal cues required (demo/gesture);set up required  -DN     UB Bathing Assess/Train, Impairments  strength decreased;coordination impaired;motor control impaired;impaired vision  -DN     Recorded by  [JOSE ALBERTO] SABRINA Smith     Lower Body Bathing Assessment/Training    LB Bathing Assess/Train, Position  sitting;standing  -DN     LB Bathing Assess/Train, [place] Level  dependent (less than 25% patient effort);verbal cues required;nonverbal cues required (demo/gesture);set up required  -DN     LB Bathing Assess/Train, Impairments  impaired balance;strength decreased;motor control impaired;impaired vision  -DN     Recorded by  [JOSE ALEBRTO] SABRINA Smith     Upper Body Dressing Assessment/Training    UB Dressing Assess/Train, Clothing Type  donning:;doffing:;pull over;t-shirt  -DN     UB Dressing Assess/Train, Assist Device  pascual technique  -DN     UB Dressing Assess/Train, Position  sitting  -DN     UB Dressing Assess/Train, [place]  maximum assist (25% patient effort);verbal cues required;nonverbal cues required (demo/gesture)  -DN     UB Dressing Assess/Train, Impairments  strength decreased;coordination impaired;motor control impaired  -DN     Recorded by  [DN] SABRINA Smith     Lower Body Dressing Assessment/Training    LB Dressing Assess/Train, Clothing Type  doffing:;donning:;pants;shoes;socks  -DN     LB Dressing Assess/Train, Position  sitting;standing  -DN     LB Dressing Assess/Train, [place]  dependent (less than 25% patient effort);2 person assist required  -DN     LB Dressing Assess/Train, Impairments  motor control impaired;coordination impaired;impaired balance;strength decreased  -DN     Recorded by  [JOSE ALBERTO] SABRINA Smith     Toileting Assessment/Training    Toileting Assess/Train, Assistive Device  grab bars;bedside commode  -DN     Toileting Assess/Train, Position  sitting;standing  -DN     Toileting Assess/Train,  [place] Level  dependent (less than 25% patient effort);2 person assist required;verbal cues required;nonverbal cues required (demo/gesture)  -DN     Toileting Assess/Train, Impairments  strength decreased;impaired balance;coordination impaired;motor control impaired  -DN     Recorded by  [DN] SABRINA Smith     Therapy Exercises    Bilateral Lower Extremities   AROM:;20 reps;supine;ankle pumps/circles;hip abduction/adduction;heel slides;quad sets;SAQ;SLR  -MD    Recorded by   [MD] Lara White, PT    Positioning and Restraints    Pre-Treatment Position  in bed  -DN in bed  -MD    Post Treatment Position  bed  -DN bed  -MD    In Bed  supine;with family/caregiver  -DN supine;call light within reach;with family/caregiver  -MD    Recorded by  [JOSE ALBERTO] SABRINA Smith [MD] Lara White, PT      01/31/18 1203 01/31/18 0900 01/30/18 1540    Rehab Assessment/Intervention    Discipline occupational therapist  -DN speech language pathologist  -SL occupational therapist  -DN    Document Type therapy note (daily note)  -DN therapy note (daily note)  -SL therapy note (daily note)  -DN    Subjective Information agree to therapy;complains of;fatigue  -DN agree to therapy  -SL no complaints;agree to therapy  -DN    Patient Effort, Rehab Treatment good  -DN fair  -SL good  -DN    Symptoms Noted During/After Treatment  fatigue  -SL fatigue  -DN    Symptoms Noted Comment pt C/O fatigue, declined shower, oppeded to complete rue ROM activities instead  -DN      Precautions/Limitations  NPO;fall precautions  -SL fall precautions;NPO;swallowing precautions  -DN    Precautions/Limitations, Vision   vision impairment, right  -DN    Patient Response to Treatment  pt very fatiqued during session, closing eyes- needed cues to stay awake and participate;   -SL     Recorded by [JOSE ALBERTO] SABRINA Smith [SL] Malia Turner MS CCC-SLP [DN] SABRINA Smith    Pain Assessment    Pain Assessment No/denies pain  -DN  Coelho-[place] FACES  -DN    [place]  FACES Pain Rating   2  -DN    Pain Score   2  -DN    Pain Type   Surgical pain  -DN    Pain Location   --   G-Tube placement sight  -DN    Pain Intervention(s)   Repositioned  -DN    Recorded by [DN] SABRINA Smith  [DN] SABRINA Smith    Vision Assessment/Intervention    Visual Impairment   visual impairment, right  -DN    Recorded by   [DN] SABRINA Smith    Cognitive Assessment/Intervention    Current Cognitive/Communication Assessment  impaired  -SL impaired  -DN    Orientation Status  oriented to;person  -SL person  -DN    Follows Commands/Answers Questions  needs increased time;needs cueing   did not follow any 1 step verbal command appropriately  -SL 25% of the time  -DN    Personal Safety   decreased awareness, need for assist;decreased awareness, need for safety;decreased insight to deficits;fully aware of deficits;unaware of cognitive deficits;unaware of consequences of deficits;unaware of functional deficits;one on one supervision required for safety  -DN    Personal Safety Interventions   gait belt;fall prevention program maintained;supervised activity  -DN    Recorded by  [SL] Malia Turner MS CCC-SLP [DN] SABRINA Smith    Improve ability to comprehend words/phrases/sentences    Improve ability to comprehend words/phrases/sentences through:  identify pictures, field of  -SL     Ability to Comprehend Words/Phrases/Sentences Progress  40%;without cues  -SL     Comments: Improve ability to comprehend words/phrases/sentences  piture id given name and descriptive cues  -SL     Recorded by  [SL] Malia Turner MS CCC-SLP     Improve ability to follow directions    Ability to Follow Directions Progress  0%;without cues;50%;with consistent cues;following model   visual model and tactile cues needed  -SL     Comments: Improve ability to follow directions  pt very perseverative; poor attn; drowsy  -SL     Recorded by  [SL] Malia Turner MS CCC-SLP     Improve word retrieval skills    Comments: Improve word  retrieval skills  80% for counting, days, and months with cues for initiation and intermittently during sequence  -SL     Recorded by  [SL] Malia Turner MS CCC-SLP     Improve motor planning    Improve motor planning to reduce apraxia by:  imitating vowels;imitating mouth postures  -SL     Motor Planning Progress  60%;70%;with inconsistent cues   random vowel imitation  -SL     Comments: Improve motor planning to Comments: Reduce apraxia  unable to protrude tongue, lateralize or elevate; could pucker and smile- r side facial droop remains  -SL     Recorded by  [SL] Malia Turner MS CCC-SLP     Therapy Exercises    Right Upper Extremity PROM:;AAROM:;15 reps;supine;elbow flexion/extension;pronation/supination;shoulder abduction/adduction;shoulder circles;shoulder extension/flexion;shoulder ER/IR;shoulder horizontal abd/add;shoulder protraction/retraction  -DN  AROM:;10 reps;supine;elbow flexion/extension;pronation/supination;shoulder abduction/adduction;shoulder extension/flexion;shoulder circles;shoulder ER/IR;shoulder horizontal abd/add;shoulder protraction/retraction  -DN    Recorded by [DN] SABRINA Smith  [DN] Justo Henry OTR    Neuromuscular Re-education    Neuromuscular Re-Ed Techniques Weight bearing RUE;Facilitation isolated movement ([place]);Gross motor task- grasp/release  -DN  Weight bearing RUE;Facilitation isolated movement ([place]);Functional Movement Patterns;Gross motor task- grasp/release  -DN    Facilitation Isolated Movement right:;scapula;shoulder;forearm;elbow;wrist;finger;thumb  -DN  right:;scapula;shoulder;elbow;forearm;wrist;finger;thumb  -DN    Detail (Neuromuscular Re-Education) completed AAROM/PROM, followed by active movement of RUE to grab cups and place with min a:  Pt now jose alejandro to actively move RUE  in all planes at varous degrees  -DN  pt presents with shoulder, elbow, forearm, wrist and finger movements WFL with delay and motor planning issues, and R thumb is trace movememnt  -DN     Recorded by [DN] SABRINA Smith  [DN] SABRINA Smith    Positioning and Restraints    Pre-Treatment Position in bed  -DN  in bed  -DN    Post Treatment Position bed  -DN  bed  -DN    In Bed supine;call light within reach;encouraged to call for assist;with family/caregiver  -DN  encouraged to call for assist;call light within reach;with family/caregiver  -DN    Recorded by [DN] SABRINA Smith  [DN] SABRINA Smith      01/30/18 1221 01/30/18 1025 01/30/18 0820    Rehab Assessment/Intervention    Discipline occupational therapist  -JOSE ALBERTO speech language pathologist  -SL physical therapist  -MD    Document Type therapy note (daily note)  -DN therapy note (daily note)  -SL therapy note (daily note)  -MD    Subjective Information agree to therapy;no complaints  -DN no complaints  -SL agree to therapy;complains of;fatigue  -MD    Patient Effort, Rehab Treatment good  -DN good  -SL good  -MD    Symptoms Noted During/After Treatment fatigue  -DN fatigue  -SL none  -MD    Precautions/Limitations fall precautions;NPO;swallowing precautions  -DN NPO;fall precautions  -SL fall precautions  -MD    Precautions/Limitations, Vision vision impairment, right  -DN      Equipment Issued to Patient   gait belt  -MD    Recorded by [JOSE ALBERTO] SABRINA Smith [SL] Malia Turner MS CCC-SLP [MD] Lara White, TJ    Pain Assessment    Pain Assessment Coelho-Xin WHATLEY  -DN  No/denies pain  -MD    Coelho-Lauren FACES Pain Rating 2  -DN      Pain Score 2  -DN      Pain Type Surgical pain  -DN      Pain Location --   g tube sight  -DN      Recorded by [DN] SABRINA Smith  [MD] Lara White, TJ    Vision Assessment/Intervention    Visual Impairment visual impairment, right;inattention to the right;right neglect  -DN  visual impairment, right;inattention to the right;needs cues to attend visually;decreased visual tracking;right neglect  -MD    Visual Field   right visual field impairment  -MD    Recorded by [JOSE ALBERTO] SABRINA Smith  [MD] Lara  Christopher PT    Cognitive Assessment/Intervention    Current Cognitive/Communication Assessment impaired  -DN impaired  -SL impaired  -MD    Orientation Status oriented to;person  -DN oriented to;person  -SL oriented to;person  -MD    Follows Commands/Answers Questions 25% of the time;able to follow single-step instructions;needs cueing;needs repetition;needs increased time  -DN 25% of the time;50% of the time;able to follow single-step instructions;needs increased time;needs cueing   needs visual cues/tactile cues  -SL 50% of the time;needs cueing;needs increased time;needs repetition  -MD    Personal Safety decreased awareness, need for assist;decreased awareness, need for safety;decreased insight to deficits;at risk behaviors demonstrated;one on one supervision required for safety  -DN  decreased awareness, need for assist;decreased awareness, need for safety;decreased insight to deficits  -MD    Personal Safety Interventions gait belt;muscle strengthening facilitated;supervised activity  -DN  gait belt;muscle strengthening facilitated;nonskid shoes/slippers when out of bed;supervised activity;fall prevention program maintained  -MD    Recorded by [DN] SABRINA Smith [SL] Malia Turner MS CCC-SLP [MD] Lara White, PT    Improve ability to comprehend words/phrases/sentences    Ability to Comprehend Words/Phrases/Sentences Progress  50%;without cues   picture (w/word written underneath stim) ID - [id number]  -SL     Recorded by  [SL] Malia Turner MS CCC-SLP     Improve ability to follow directions    Ability to Follow Directions Progress  20%;without cues;50%;with consistent cues;following model  -SL     Recorded by  [SL] Malia Turner MS CCC-SLP     Improve ability to comprehend questions    Improve ability to comprehend questions:  simple yes/no questions  -SL     Ability to Comprehend Questions Progress  40%;50%;without cues  -SL     Recorded by  [SL] Malia Turner MS CCC-SLP     Improve word retrieval skills    Word Retrieval  Skills Progress  0%;without cues;50%;with consistent cues;following model   naming  -SL     Comments: Improve word retrieval skills  40% - phrase completions w/1 word  -SL     Recorded by  [] Malia Turner MS CCC-SLP     Improve ability to construct phrase and sentence level responses    Comments: Improve ability to construct phrase and sentence level responses  30- 50% for songs, rhymes; counting, days, months, and abc..- 80%  -SL     Recorded by  [] Malia Turner MS CCC-SLP     Improve motor planning    Improve motor planning to reduce apraxia by:  imitating vowels;following isolated oral commands  -SL     Motor Planning Progress  80%;90%;with inconsistent cues;following model   vowels  -SL     Comments: Improve motor planning to Comments: Reduce apraxia  simple cvc combinations w/max cues-20%  -SL     Recorded by  [SL] Malia Turner MS CCC-SLP     Improve planning and execution of connected speech    Comments: Improve planning and execution of connected speech  still has difficulty w/oral motor mvmt imitation, but could pucker, smile, and lateralize tongu w/visual cues  -SL     Recorded by  [SL] Malia Turner MS CCC-SLP     Bed Mobility, Assessment/Treatment    Bed Mob, Supine to Sit, [place]   verbal cues required;minimum assist (75% patient effort);moderate assist (50% patient effort);2 person assist required  -MD    Bed Mob, Sit to Supine, [place]   not tested  -MD    Bed Mobility, Safety Issues   cognitive deficits limit understanding;decreased use of arms for pushing/pulling;decreased use of legs for bridging/pushing  -MD    Bed Mobility, Impairments   strength decreased;impaired balance;coordination impaired;motor control impaired  -MD    Recorded by   [MD] Lara White PT    Transfer Assessment/Treatment    Transfers, Bed-Chair [place]   verbal cues required;contact guard assist  -MD    Transfers, Chair-Bed [place] minimum assist (75% patient effort);verbal cues required  -DN  not tested  -MD     Transfers, Bed-Chair-Bed, Assist Device   bed rails  -MD    Transfers, Sit-Stand [place]   verbal cues required;minimum assist (75% patient effort)  -MD    Transfers, Stand-Sit [place]   verbal cues required;minimum assist (75% patient effort)  -MD    Toilet Transfer, [place] minimum assist (75% patient effort);verbal cues required  -DN      Toilet Transfer, Assistive Device wheelchair;bedside commode without drop arms  -DN      Transfer, Safety Issues weight-shifting ability decreased;step length decreased;sequencing ability decreased;balance decreased during turns  -DN  weight-shifting ability decreased;step length decreased;balance decreased during turns  -MD    Transfer, Impairments strength decreased;impaired balance;coordination impaired;motor control impaired;muscle tone abnormal;impaired vision  -DN  strength decreased;sensation decreased;ROM decreased;impaired balance;coordination impaired;motor control impaired  -MD    Recorded by [JOSE ALBERTO] Justo Henry OTR  [MD] Lara White PT    Gait Assessment/Treatment    Gait, [place] Level   verbal cues required;contact guard assist;minimum assist (75% patient effort)  -MD    Gait, Assistive Device   other (see comments)   HHA  -MD    Gait, Distance (Feet)   80   x2  -MD    Gait, Gait Deviations   bilateral:;luke decreased;forward flexed posture;step length decreased;left:;antalgic;right:;decreased heel strike;weight-shifting ability decreased;narrow base;limb motion velocity decreased  -MD    Gait, Safety Issues   step length decreased;weight-shifting ability decreased;sequencing ability decreased  -MD    Gait, Impairments   strength decreased;impaired balance;coordination impaired;motor control impaired  -MD    Recorded by   [MD] Lara White PT    Upper Body Bathing Assessment/Training    UB Bathing Assess/Train, Position sitting;sink side  -DN      UB Bathing Assess/Train, [place] Level maximum assist (25% patient effort);set up  required;supervision required;verbal cues required  -DN      UB Bathing Assess/Train, Impairments strength decreased;sensation decreased;impaired balance;coordination impaired;motor control impaired;impaired vision  -DN      Recorded by [JOSE ALBERTO] SABRINA Smith      Lower Body Bathing Assessment/Training    LB Bathing Assess/Train, Comment already bathed after commode  -DN      Recorded by [JOSE ALBERTO] SABRINA Smith      Upper Body Dressing Assessment/Training    UB Dressing Assess/Train, Clothing Type doffing:;donning:;pull over;t-shirt  -DN      UB Dressing Assess/Train, Assist Device pascual technique  -DN      UB Dressing Assess/Train, Position sitting  -DN      UB Dressing Assess/Train, [place] maximum assist (25% patient effort);verbal cues required;nonverbal cues required (demo/gesture);set up required  -DN      UB Dressing Assess/Train, Impairments strength decreased;impaired balance;coordination impaired;motor control impaired;impaired vision  -DN      Recorded by [JOSE ALBERTO] SABRINA Smith      Lower Body Dressing Assessment/Training    LB Dressing Assess/Train, Comment already completed in am  -DN      Recorded by [JOSE ALBERTO] SABRINA Smith      Toileting Assessment/Training    Toileting Assess/Train, Assistive Device bedside commode  -DN      Toileting Assess/Train, Position sitting;standing  -DN      Toileting Assess/Train, Indepen Level dependent (less than 25% patient effort);verbal cues required;nonverbal cues required (demo/gesture)  -DN      Toileting Assess/Train, Impairments motor control impaired;coordination impaired;impaired balance;strength decreased;sensation decreased  -DN      Recorded by [JOSE ALBERTO] SABRINA Smith      Therapy Exercises    Bilateral Lower Extremities   AROM:;20 reps;supine;ankle pumps/circles;hip abduction/adduction;heel slides;quad sets;SAQ;SLR  -MD    Recorded by   [MD] Lara White PT    Positioning and Restraints    Pre-Treatment Position --   sitting in w/c  -DN  in bed  -MD     Post Treatment Position bed  -DN  bed  -MD    In Bed supine;exit alarm on;with family/caregiver;encouraged to call for assist;call light within reach  -DN  supine;call light within reach;with family/caregiver  -MD    Recorded by [DN] Justo Henry, OTR  [MD] Lara White, PT      User Key  (r) = Recorded By, (t) = Taken By, (c) = Cosigned By    Initials Name Effective Dates    SL Malia Turner, MS CCC-SLP 04/13/15 -     DN Justo Henry, OTR 04/13/15 -     MD Lara White, PT 12/01/15 -                 OT Goals       02/01/18 1639 01/23/18 1224       Transfer Training OT STG    Transfer Training OT STG, Date Established 02/01/18  -DN 01/23/18  -DN     Transfer Training OT STG, [place] Level minimum assist (75% patient effort)  -DN      Transfer Training OT STG, Assist Device commode, bedside;tub bench  -DN      Transfer Training OT STG, Outcome goal not met  -DN goal ongoing  -DN     Transfer Training OT STG, Reason Goal Not Met unable to make needed progress  -DN      Transfer Training OT LTG    Transfer Training OT LTG, Date Established 02/01/18  -DN 01/23/18  -DN     Transfer Training OT LTG, Time to Achieve by discharge  -DN      Transfer Training OT LTG, Outcome goal not met  -DN goal ongoing  -DN     Caregiver Training OT LTG    Caregiver Training OT LTG, Date Established 02/01/18  -DN 01/23/18  -DN     Caregiver Training OT LTG, Outcome goal met  -DN goal ongoing  -DN     Patient Education OT LTG    Patient Education OT LTG, Date Established 02/01/18  -DN 01/23/18  -DN     Patient Education OT LTG Outcome goal not met  -DN      Patient Education OT LTG, Reason Goal Not Met unable to make needed progress  -DN      Follow Directions OT LTG    Follow Directions OT LTG, Date Established 02/01/18  -DN 01/23/18  -DN     Follow Directions OT LTG, Outcome goal not met  -DN goal ongoing  -DN     Follow Directions OT LTG, Reason Goal Not Met unable to make needed progress  -DN      Tracking OT LTG    Tracking OT LTG, Date  Established 02/01/18  -DN 01/23/18  -DN     Tracking OT LTG, Reason Goal Not Met unable to make needed progress  -DN      ADL OT STG    ADL OT STG, Date Established 02/01/18  -DN 01/23/18  -DN     ADL OT STG, Outcome goal not met  -DN goal ongoing  -DN     ADL OT STG, Reason Goal Not Met unable to make needed progress  -DN      ADL OT LTG    ADL OT LTG, Date Established 02/01/18  -DN 01/23/18  -DN     ADL OT LTG, Outcome  goal ongoing  -DN     ADL OT LTG, Reason Goal Not Met unable to make needed progress  -DN        User Key  (r) = Recorded By, (t) = Taken By, (c) = Cosigned By    Initials Name Provider Type    SABRINA Nolasco Occupational Therapist          Occupational Therapy Education     Title: PT OT SLP Therapies (Active)     Topic: Occupational Therapy (Done)     Point: ADL training (Done)    Description: Instruct learner(s) on proper safety adaptation and remediation techniques during self care or transfers.   Instruct in proper use of assistive devices.    Learning Progress Summary    Learner Readiness Method Response Comment Documented by Status   Patient aPrdeep LOPEZ family conferance held with pt and  today to discuss current status with adls and commode/ tub trasfers, issues with safety, vision, apasia, cognition need for 24 hour supervision, equipment needs and home health ot recommeded DN 01/30/18 1229 Done    RUPINDER Kennedy D VU,NR pt  assisted with all adls today, did well, could use more adl time to be ready for d/c home with wife DN 01/26/18 1515 Done    SONU Santiago DU  assisted pt with commode transfer, is now ok to assist pt in room. alerted NSG AF 01/09/18 1542 Done    RUPINDER Santiago D NR,VU Gait training & transfer training with increased attention to R. Balance activities. JS 01/06/18 1256 Done    RUPINDER Kennedy D VU,NR pt  present during adls in tub, observed technique with transfer, vc/demonstration technique thoughout adls, DN 01/06/18 1203 Done     Acceptance E VU OT role SO 01/04/18 1605 Done   Significant Other Pardeep LOPEZ family conferance held with pt and  today to discuss current status with adls and commode/ tub trasfers, issues with safety, vision, apasia, cognition need for 24 hour supervision, equipment needs and home health ot recommeded DN 01/30/18 1229 Done    Eager ETB,D VU,NR pt  assisted with all adls today, did well, could use more adl time to be ready for d/c home with wife DN 01/26/18 1515 Done    Acceptance E,D VU,DU  assisted pt with commode transfer, is now ok to assist pt in room. alerted NSG AF 01/09/18 1542 Done    Eager DEMARTB,D VU,NR pt  present during adls in tub, observed technique with transfer, vc/demonstration technique thoughout adls, DN 01/06/18 1203 Done               Point: Home exercise program (Done)    Description: Instruct learner(s) on appropriate technique for monitoring, assisting and/or progressing therapeutic exercises/activities.    Learning Progress Summary    Learner Readiness Method Response Comment Documented by Status   Patient Pardeep LOPEZ family conferance held with pt and  today to discuss current status with adls and commode/ tub trasfers, issues with safety, vision, apasia, cognition need for 24 hour supervision, equipment needs and home health ot recommeded DN 01/30/18 1229 Done    Eager E,TB,D,H VU,NR pt  educated on PROM/AAROM throughtout RUE supine in bed DN 01/25/18 1549 Done    Acceptance E,TB,D NR,VU Gait training & transfer training with increased attention to R. Balance activities. JS 01/06/18 1256 Done    Eager E,TB,D VU,NR pt  present during adls in tub, observed technique with transfer, vc/demonstration technique thoughout adls, DN 01/06/18 1203 Done   Family Eager E,TB,D,H VU,NR pt  educated on PROM/AAROM throughtout RUE supine in bed DN 01/25/18 1549 Done   Significant Other Pardeep LOPEZ family conferance held with pt and  today to  discuss current status with adls and commode/ tub trasfers, issues with safety, vision, apasia, cognition need for 24 hour supervision, equipment needs and home health ot recommeded DN 01/30/18 1229 Done    RUPINDER Kennedy D VU,NR pt  present during adls in tub, observed technique with transfer, vc/demonstration technique thoughout adls, DN 01/06/18 1203 Done               Point: Precautions (Done)    Description: Instruct learner(s) on prescribed precautions during self-care and functional transfers.    Learning Progress Summary    Learner Readiness Method Response Comment Documented by Status   Patient Pardeep LOPEZ family conferance held with pt and  today to discuss current status with adls and commode/ tub trasfers, issues with safety, vision, apasia, cognition need for 24 hour supervision, equipment needs and home health ot recommeded DN 01/30/18 1229 Done    RUPINDER Santiago D NR,VU Gait training & transfer training with increased attention to R. Balance activities. JS 01/06/18 1256 Done    RUPINDER Kennedy D VU,NR pt  present during adls in tub, observed technique with transfer, vc/demonstration technique thoughout adls, DN 01/06/18 1203 Done   Significant Other Pardeep LOPEZ family conferance held with pt and  today to discuss current status with adls and commode/ tub trasfers, issues with safety, vision, apasia, cognition need for 24 hour supervision, equipment needs and home health ot recommeded DN 01/30/18 1229 Done    RUPINDER Kennedy D VU,NR pt  present during adls in tub, observed technique with transfer, vc/demonstration technique thoughout adls, DN 01/06/18 1203 Done               Point: Body mechanics (Done)    Description: Instruct learner(s) on proper positioning and spine alignment during self-care, functional mobility activities and/or exercises.    Learning Progress Summary    Learner Readiness Method Response Comment Documented by Status   Patient Pardeep LOPEZ family conferance held  with pt and  today to discuss current status with adls and commode/ tub trasfers, issues with safety, vision, apasia, cognition need for 24 hour supervision, equipment needs and home health ot recommeded DN 01/30/18 1229 Done    Acceptance E,TB,D NR,VU Gait training & transfer training with increased attention to R. Balance activities. JS 01/06/18 1256 Done    Eager E,TB,D VU,NR pt  present during adls in tub, observed technique with transfer, vc/demonstration technique thoughout adls, DN 01/06/18 1203 Done   Significant Other Eager E VU family conferance held with pt and  today to discuss current status with adls and commode/ tub trasfers, issues with safety, vision, apasia, cognition need for 24 hour supervision, equipment needs and home health ot recommeded DN 01/30/18 1229 Done    Eager E,TB,D VU,NR pt  present during adls in tub, observed technique with transfer, vc/demonstration technique thoughout adls, DN 01/06/18 1203 Done                      User Key     Initials Effective Dates Name Provider Type Discipline    DN 04/13/15 -  Justo Henry, OTR Occupational Therapist OT    SO 04/13/15 -  Nicolette Baez, OTR Occupational Therapist OT    JS 04/06/17 -  Lyn Lynch, PT Physical Therapist PT    AF 12/01/15 -  Jenelle Bello, OTR Occupational Therapist OT                OT Recommendation and Plan  Anticipated Discharge Disposition: home with home health, home with assist  Therapy Frequency: 5 times/wk  Plan of Care Review  Plan Of Care Reviewed With: patient  Outcome Summary/Follow up Plan: pt had decline in status this week due to stroke extention, pt goals revised, however pt still good work ethic and  cooperative with all request for therapy          Outcome Measures       02/01/18 1600          Modified [place] Scale    Modified [place] Scale 4 - Moderately severe disability.  Unable to walk without assistance, and unable to attend to own bodily needs without assistance.   -DN      Functional Assessment    Outcome Measure Options Modified [place]  -DN        User Key  (r) = Recorded By, (t) = Taken By, (c) = Cosigned By    Initials Name Provider Type    SABRINA Nolasco Occupational Therapist           Time Calculation:          Time Calculation- OT       02/01/18 1642 02/01/18 1100       Time Calculation- OT    OT Start Time 1400  -DN 1100  -DN     OT Stop Time 1430  -DN 1145  -DN     OT Time Calculation (min) 30 min  -DN 45 min  -DN     OT Non-Billable Time (min) 15 min   team rounds  -DN      OT Received On 02/01/18  -DN 02/01/18  -DN       User Key  (r) = Recorded By, (t) = Taken By, (c) = Cosigned By    Initials Name Provider Type    SABRINA Nolasco Occupational Therapist          Therapy Charges for Today     Code Description Service Date Service Provider Modifiers Qty    22068338391 HC OT THER PROC EA 15 MIN 1/31/2018 SABRINA Smith GO 2    64422875625 HC OT SELF CARE/MGMT/TRAIN EA 15 MIN 1/31/2018 SABRINA Smith GO 2    24186803937 HC OT CARE PLAN EA 15 MIN 2/1/2018 SABRINA Smith GO 1    74749313178 HC OT SELF CARE/MGMT/TRAIN EA 15 MIN 2/1/2018 SABRINA Smith GO 3    [id number] HC OT THER PROC EA 15 MIN 2/1/2018 SABRINA Smith GO 2               OT Discharge Summary  Anticipated Discharge Disposition: home with home health, home with assist  Reason for Discharge: Discharge from facility  Outcomes Achieved: Unable to make functional progress toward goals at this time  Discharge Destination: Home with assist, Home with home health    SABRINA Smith  2/1/2018

## 2018-02-01 NOTE — PROGRESS NOTES
SECTION GG      Mobility Performance Discharge:   Roll Left and Right: [place] does less than half the effort. [place] lifts, holds  or supports trunk or limbs but provides less than half the effort.   Sit to Lying: [place] does less than half the effort. [place] lifts, holds or  supports trunk or limbs but provides less than half the effort.   Lying to Sitting on Side of Bed: [place] does less than half the effort. [place]  lifts, holds or supports trunk or limbs but provides less than half the effort.   Sit to Stand: [place] provides verbal cues or touching/steadying assistance as  patient completes activity.   Chair/Bed to Chair Transfer: [place] does less than half the effort. [place]  lifts, holds or supports trunk or limbs but provides less than half the effort.   Car Transfer: [place] provides verbal cues or touching/steadying assistance as  patient completes activity.    Patient Walks:  Yes   Walk 10 Feet: [place] does less than half the effort. [place] lifts, holds or  supports trunk or limbs but provides less than half the effort.   Walk 50 Feet With 2 Turns: [place] does less than half the effort. [place] lifts,  holds or supports trunk or limbs but provides less than half the effort.   Walk 150 Feet: Not attempted due to medical or safety concerns.   Walking 10 Feet on Uneven Surfaces: [place] does less than half the effort.  [place] lifts, holds or supports trunk or limbs but provides less than half the  effort.   1 Step Over Curb or Up/Down Stair: [place] does less than half the effort.  [place] lifts, holds or supports trunk or limbs but provides less than half the  effort.   4 Steps Up and Down, With/Without Rail: [place] does less than half the effort.  [place] lifts, holds or supports trunk or limbs but provides less than half the  effort.   12 Steps Up and Down, With/Without Rail: Not attempted due to medical or safety  concerns.   Picking up an Object: Not attempted due to medical or safety concerns.     Uses  Wheelchair/Scooter: No    Signed by: Lara White, PT

## 2018-02-01 NOTE — PROGRESS NOTES
Inpatient Rehabilitation Plan of Care Note    Plan of Care  Care Plan Reviewed - No updates at this time.    Communication    [RN] Expression(Active)  Current Status(02/01/2018): Pt has global aphasia, nonverbal  Weekly Goal(02/02/2018): Pt will make needs know via gestures  Discharge Goal: Pt will make needs known.    Performed Intervention(s)  Allow time for responses  offer bathroom , reposition every 2 hrs      Psychosocial    [RN] Coping/Adjustment(Active)  Current Status(02/01/2018): Pt is alert but communication verbally is  significantly impaired. New stroke, new cancer dx.  very supportive.  Weekly Goal(02/02/2018): Identify progress in functional status.  Discharge Goal: Adequate coping regarding life changes with ongoing support for  pt and .    Performed Intervention(s)  Therapeutic enviroment  Allow for verbalization of feelings from , monitor pt's mood.      Safety    [RN] Potential for Injury(Active)  Current Status(02/01/2018): Pt has global aphasia and is at risk for injury  Weekly Goal(02/02/2018): Pt will use call bell  Discharge Goal: No injury    Performed Intervention(s)  Call bell in reach  Non skid footware  BA, chair alarm, safety strategies, falls protocol  items in reach      Sphincter Control    [RN] Bladder Management(Active)  Current Status(02/01/2018): Continent of bladder except has had 2 episodes  incont. per report.  Weekly Goal(02/02/2018): 100% cont  Discharge Goal: 100% cont    [RN] Bowel Management(Active)  Current Status(02/01/2018): cont bowel with constipation  Weekly Goal(02/02/2018): cont 100%, with movement q1-3 days  Discharge Goal: cont 100%    Performed Intervention(s)  Btrm schdule every 4 hours  Monitor I and O  Bowel program    Signed by: Elizabeth Elias RN

## 2018-02-01 NOTE — PROGRESS NOTES
LOS: 29 days   Patient Care Team:  Peng Murdock MD as PCP - General  Peng Murdock MD as PCP - Family Medicine  Isael Ireland MD as Referring Physician (Internal Medicine)    Chief Complaint: same    Subjective     History of Present Illness    Subjective Pt is awake and alert. No new issues.  is present and voices concern re plan to dc tomorrow. He is currently refusing to consider subacute placement.     History taken from: patient    Objective     Vital Signs  Temp:  [97.9 °F (36.6 °C)-98.5 °F (36.9 °C)] 98.4 °F (36.9 °C)  Heart Rate:  [] 89  Resp:  [16-18] 18  BP: ()/(54-65) 96/54    Objective    Results Review:     I reviewed the patient's new clinical results.    Medication Review:     Assessment/Plan     Active Problems:    Expressive aphasia    Dyslipidemia    Cardioembolic stroke    Diastolic CHF, chronic    Aortic insufficiency    Mitral regurgitation    Atelectasis    CVA (cerebral vascular accident)      Assessment & Plan I returned to pt room to discuss dc plan as finalized in our team conference this jazmine james Medication reconciliation was completed this am.     Eduard Gordon MD  02/01/18  7:16 AM    Time:

## 2018-02-01 NOTE — THERAPY DISCHARGE NOTE
Inpatient Rehabilitation - Physical Therapy Treatment Note/Discharge  [facility]     Patient Name: Masha Porter  : 1960  MRN: [id number]  Today's Date: 2018  Onset of Illness/Injury or Date of Surgery Date: 17  Date of Referral to PT: 18  Referring Physician: Heidi    Admit Date: 1/3/2018    Visit Dx:    ICD-10-CM ICD-9-CM   1. Primary lung cancer, left C34.92 162.9   2. Oropharyngeal dysphagia R13.12 787.22     Patient Active Problem List   Diagnosis   • Expressive aphasia   • Dyslipidemia   • Cardioembolic stroke   • Diastolic CHF, chronic   • Aortic insufficiency   • Mitral regurgitation   • Atelectasis   • CVA (cerebral vascular accident)   • Lung cancer, lower lobe   • Bone metastasis   • Oropharyngeal dysphagia       Physical Therapy Education     Title: PT OT SLP Therapies (Active)     Topic: Physical Therapy (Resolved)     Point: Mobility training (Resolved)    Learning Progress Summary    Learner Readiness Method Response Comment Documented by Status   Patient Eager E,D,TB NR  MELODY 18 1229 Active    Acceptance E NR  MG 18 1155 Active    Acceptance E,D NR   18 1527 Active    Acceptance E,TB,D NR   18 1345 Active    Acceptance E,TB,D NR,VU Gait training & transfer training with increased attention to R. Balance activities. JS 18 1256 Done   Family Acceptance E,D DU Family teaching complete for stairs.  Spouse states he is comfortable with all other aspects of functional mobility, car transfer, transfers, bed mobility, and ambulation MD 18 1509 Done    Acceptance E,D VUCASSANDRA family teaching complete for curb, car transfer and walking MD 18 1446 Done    Acceptance E,TB,D VU,DU family teaching for bed-chair transfers complete. MD 18 0947 Done               Point: Home exercise program (Resolved)    Learning Progress Summary    Learner Readiness Method Response Comment Documented by Status   Patient Acceptance DEMAR LOPEZ MD 18  1508 Done    Acceptance E NR  MG 01/20/18 1155 Active    Acceptance E,D NR   01/11/18 1527 Active    Acceptance E,TB,D NR   01/09/18 1345 Active    Acceptance E,TB,D NR,VU Gait training & transfer training with increased attention to R. Balance activities.  01/06/18 1256 Done   Family Acceptance H,E JESSICA MCRAE 02/01/18 1508 Done               Point: Body mechanics (Resolved)    Learning Progress Summary    Learner Readiness Method Response Comment Documented by Status   Patient Acceptance E NR  MG 01/20/18 1155 Active    Acceptance E,TB,D NR   01/09/18 1345 Active    Acceptance E,TB,D NR,VU Gait training & transfer training with increased attention to R. Balance activities.  01/06/18 1256 Done               Point: Precautions (Resolved)    Learning Progress Summary    Learner Readiness Method Response Comment Documented by Status   Patient Acceptance E,D NR  MD 01/31/18 1525 Active    Acceptance E,D NR  MD 01/30/18 0823 Active    Acceptance E,D NR  MD 01/26/18 0946 Active    Acceptance E,D NR  MD 01/25/18 1252 Active    Acceptance E,D NR  MD 01/24/18 0826 Active    Acceptance E,D NR  MD 01/23/18 0857 Active    Acceptance E,D NR  MD 01/22/18 0840 Active    Acceptance E NR  MG 01/20/18 1155 Active    Acceptance E,D NR  MD 01/19/18 1427 Active    Acceptance E,D NR  MD 01/18/18 0947 Active    Acceptance E,D NR  MD 01/17/18 1313 Active    Acceptance E,D NR  MD 01/16/18 1134 Active    Acceptance E,D VU  MD 01/16/18 1023 Done    Acceptance E,D NR  MD 01/13/18 0926 Active    Acceptance E,D NR  MD 01/12/18 0930 Active    Acceptance E,TB,D NR   01/09/18 1345 Active    Acceptance E,D NR  MD 01/09/18 0945 Active    Acceptance E,D NR  MD 01/08/18 0927 Active    Acceptance E,TB,D NR,VU Gait training & transfer training with increased attention to R. Balance activities.  01/06/18 1256 Done    Acceptance E,D NR  MD 01/05/18 0959 Active    Acceptance E,D NR  MD 01/04/18 1435 Active   Family Acceptance E,D VU,DU Family  teaching completed for steps.  Spouse states he feels comfortable with transfers, car transfer, ambulation and bed mobility. MD 02/01/18 0953 Done                      User Key     Initials Effective Dates Name Provider Type Discipline    MELODY 04/24/15 -  Paty Nam, PT Physical Therapist PT    JS 04/06/17 -  Lyn Lynch, PT Physical Therapist PT    LH 02/07/17 -  Barbra Don, PT Physical Therapist PT    MD 12/01/15 -  Lara White, PT Physical Therapist PT    KP 12/01/15 -  Meredith Rosas, PT Physical Therapist PT    MG 09/13/17 -  Megan Gosselin, PT Physical Therapist PT                    IP PT Goals       02/01/18 1506 01/25/18 1411       Bed Mobility PT LTG    Bed Mobility PT LTG, [place] Level  contact guard assist  -MD     Bed Mobility PT LTG, Date Goal Reviewed 02/01/18  -MD 01/25/18  -MD     Bed Mobility PT LTG, Outcome goal not met  -MD goal revised  -MD     Bed Mobility PT LTG, Reason Goal Not Met  goals revised this date  -MD     Transfer Training PT LTG    Transfer Training PT LTG, Assist Device  other (see comments)   HHA  -MD     Transfer Training PT  LTG, Date Goal Reviewed 02/01/18  -MD 01/25/18  -MD     Transfer Training PT LTG, Outcome goal not met  -MD goal revised  -MD     Transfer Training PT LTG, Reason Goal Not Met  goals revised this date  -MD     Transfer Training 2 PT LTG    Transfer Training PT 2 LTG, Date Goal Reviewed 02/01/18  -MD 01/25/18  -MD     Transfer Training PT 2 LTG, Outcome goal met  -MD goal ongoing  -MD     Gait Training PT LTG    Gait Training Goal PT LTG, [place] Level  contact guard assist  -MD     Gait Training Goal PT LTG, Date Goal Reviewed 02/01/18  -MD 01/25/18  -MD     Gait Training Goal PT LTG, Outcome goal not met  -MD goal revised  -MD     Gait Training Goal PT LTG, Reason Goal Not Met  goals revised this date  -MD     Patient Education PT LTG    Patient Education PT LTG, Date Goal Reviewed 02/01/18  -MD 01/25/18  -MD     Patient Education PT LTG  Outcome goal met  -MD goal ongoing  -MD       User Key  (r) = Recorded By, (t) = Taken By, (c) = Cosigned By    Initials Name Provider Type    MD Lara White, PT Physical Therapist              Adult Rehabilitation Note       02/01/18 1030 02/01/18 0916 02/01/18 0841    Rehab Assessment/Intervention    Discipline speech language pathologist  -SL physical therapist  -MD speech language pathologist  -SL    Document Type therapy note (daily note)  -SL therapy note (daily note);discharge summary  -MD therapy note (daily note)  -SL    Subjective Information no complaints;agree to therapy  -SL no complaints;agree to therapy  -MD no complaints;agree to therapy  -SL    Patient Effort, Rehab Treatment good  -SL good  -MD adequate  -SL    Symptoms Noted During/After Treatment none  -SL none  -MD     Precautions/Limitations NPO;fall precautions  -SL fall precautions  -MD fall precautions;NPO  -SL    Specific Treatment Considerations   nasal O2 in place  -    Patient Response to Treatment performed much better today in tx- alert, awake for both sessions- not as fatiqued  -      Equipment Issued to Patient  gait belt  -MD     Recorded by [SL] Malia Turner, MS CCC-SLP [MD] Lara White, PT [SL] Malia Turner, MS CCC-SLP    Vital Signs    Pre SpO2 (%)  92   w 2L O2 97%  -MD     O2 Delivery Pre Treatment  room air  -MD     Intra SpO2 (%)  83   w 2L O2 94% after ambulating up and down 4 steps  -MD     O2 Delivery Intra Treatment  room air  -MD     Post SpO2 (%)  88  -MD     O2 Delivery Post Treatment  room air  -MD     Pre Patient Position  Sitting  -MD     Intra Patient Position  Sitting   following 80` of ambulation  -MD     Post Patient Position  Sitting  -MD     Recovery Time  approx 30 sec  -MD     Recorded by  [MD] Lara White, PT     Pain Assessment    Pain Assessment  No/denies pain  -MD     Recorded by  [MD] Lara White PT     Vision Assessment/Intervention    Visual Impairment  visual impairment, right  -MD     Visual Field  right  visual field impairment  -MD     Recorded by  [MD] Lara White, PT     Cognitive Assessment/Intervention    Current Cognitive/Communication Assessment impaired  -SL impaired  -MD impaired  -SL    Orientation Status oriented to;person;place  -SL oriented to;person  -MD oriented to;person  -SL    Follows Commands/Answers Questions needs increased time;needs cueing  -SL 75% of the time;needs cueing;needs increased time;needs repetition  -MD needs cueing;needs increased time  -SL    Personal Safety  decreased insight to deficits  -MD     Personal Safety Interventions  fall prevention program maintained;gait belt;muscle strengthening facilitated;nonskid shoes/slippers when out of bed;supervised activity  -MD     Recorded by [SL] Malia Turner MS CCC-SLP [MD] Lara White, PT [SL] Malia Turner MS CCC-SLP    Improve ability to comprehend words/phrases/sentences    Improve ability to comprehend words/phrases/sentences through: identify pictures, field of  -SL      Ability to Comprehend Words/Phrases/Sentences Progress 60%;70%;with inconsistent cues  -SL      Recorded by [SL] Malia Turner MS CCC-SLP      Improve ability to comprehend questions    Improve ability to comprehend questions:   simple yes/no questions  -SL    Ability to Comprehend Questions Progress   60%;with inconsistent cues  -SL    Recorded by   [SL] Malia Turner MS CCC-SLP    Improve word retrieval skills    Word Retrieval Skills Progress   10%;without cues;60%;with consistent cues;following model   naming items; phonemic cues/imitative production beneficial  -    Comments: Improve word retrieval skills 100% for aut  tasks with min cues from slo ( needed for initiation and intermittently during longer production of months)- improved intelligibility of stimulus  -SL  80% for sent completions with one word with added phonemic cues  -SL    Recorded by [SL] Malia Turner MS CCC-SLP  [SL] Malia Turner MS CCC-SLP    Improve motor planning    Improve motor planning to reduce apraxia  by: imitating mouth postures;imitating vowels  -SL      Motor Planning Progress 80%;with inconsistent cues  -SL      Recorded by [SL] Malia Turner MS CCC-SLP      Improve planning and execution of connected speech    Planning and Execution of Connected Speech Progress 50%;with inconsistent cues   response to ?- [facility], level 1  -SL  40%;with consistent cues   songs/rhymes w/cues form slp  -SL    Comments: Improve planning and execution of connected speech 100% for production of words/phrases in unison with slp using melodic intonation; imporved clarity of productions; noted improved spont production of stimulus after slp asked question-  -SL      Recorded by [SL] Malia Turner MS CCC-SLP  [SL] Malia Turner MS CCC-SLP    Bed Mobility, Assessment/Treatment    Bed Mobility, Roll Left, [place]  verbal cues required;contact guard assist  -MD     Bed Mobility, Roll Right, [place]  verbal cues required;minimum assist (75% patient effort)  -MD     Bed Mobility, Scoot/Bridge, [place]  supervision required;verbal cues required  -MD     Bed Mob, Supine to Sit, [place]  verbal cues required;nonverbal cues required (demo/gesture);minimum assist (75% patient effort)  -MD     Bed Mob, Sit to Supine, [place]  verbal cues required;minimum assist (75% patient effort)  -MD     Bed Mobility, Safety Issues  cognitive deficits limit understanding;decreased use of arms for pushing/pulling;decreased use of legs for bridging/pushing  -MD     Bed Mobility, Impairments  strength decreased;impaired balance;coordination impaired;motor control impaired  -MD     Bed Mobility, Comment  Pt up in chair when PT arrived this am.  Pt spouse assisted pt out of bed and to chair.  -MD     Recorded by  [MD] Lara White, PT     Transfer Assessment/Treatment    Transfers, Sit-Stand [place]  verbal cues required;contact guard assist;minimum assist (75% patient effort)  -MD     Transfers, Stand-Sit [place]  verbal cues required;contact  guard assist;minimum assist (75% patient effort)  -MD     Transfers, Sit-Stand-Sit, Assist Device  other (see comments)   HHA  -MD     Transfer, Safety Issues  weight-shifting ability decreased;step length decreased;sequencing ability decreased;balance decreased during turns  -MD     Transfer, Impairments  strength decreased;impaired balance;coordination impaired;motor control impaired;pain  -MD     Transfer, Comment  car transfer: CGA  -MD     Recorded by  [MD] Lara White PT     Gait Assessment/Treatment    Gait, [place] Level  verbal cues required;minimum assist (75% patient effort)  -MD     Gait, Assistive Device  other (see comments)   HHA  -MD     Gait, Distance (Feet)  80  -MD     Gait, Gait Deviations  bilateral:;luke decreased;forward flexed posture;left:;antalgic;right:;decreased heel strike;toe-to-floor clearance decreased  -MD     Gait, Safety Issues  step length decreased  -MD     Gait, Impairments  strength decreased;impaired balance;coordination impaired;motor control impaired  -MD     Recorded by  [MD] Lara White PT     Stairs Assessment/Treatment    Number of Stairs  4  -MD     Stairs, Handrail Location  none  -MD     Stairs, [place] Level  minimum assist (75% patient effort);contact guard assist;2 person assist required  -MD     Stairs, Technique Used  step to step (ascending);step to step (descending)  -MD     Stairs, Safety Issues  sequencing ability decreased  -MD     Stairs, Impairments  strength decreased;impaired balance;coordination impaired  -MD     Stairs, Comment  Practiced steps with spouse providing HHA, Min A and PT providing CGA due to first attempt and family teaching.  In PM spouse completed steps w/o CGA from PT.  Pt went up and down curb step w Min A  -MD     Recorded by  [MD] Lara White PT     Balance Skills Training    Gait Balance-Level of Assistance  Minimum assistance  -MD     Gait Balance Support  Left upper extremity supported  -MD     Gait Balance Activities   uneven surface  -MD     Gait Balance # of Minutes  --   20`  -MD     Recorded by  [MD] Lara White PT     Positioning and Restraints    Pre-Treatment Position  sitting in chair/recliner  -MD     Post Treatment Position  other  -MD     Other Position  return to room with caregiver  -MD     Recorded by  [MD] Lara White PT       01/31/18 1543 01/31/18 1449 01/31/18 1203    Rehab Assessment/Intervention    Discipline occupational therapist  -DN physical therapist  -MD occupational therapist  -DN    Document Type therapy note (daily note)  -DN therapy note (daily note)  -MD therapy note (daily note)  -DN    Subjective Information agree to therapy;complains of;fatigue;pain  -DN agree to therapy;complains of;fatigue  -MD agree to therapy;complains of;fatigue  -DN    Patient Effort, Rehab Treatment  good  -MD good  -DN    Symptoms Noted During/After Treatment fatigue;shortness of breath;increased pain  -DN fatigue;shortness of breath  -MD     Symptoms Noted Comment  In am pt experienced SOA after walking 40ft.  O2 was 83% following a rest break of 2-3 min O2 was 88% RN was notified of these events.  -MD pt C/O fatigue, declined shower, oppeded to complete rue ROM activities instead  -DN    Precautions/Limitations fall precautions  -DN fall precautions  -MD     Equipment Issued to Patient  gait belt  -MD     Recorded by [DN] Justo Henry OTR [MD] Lara White, PT [DN] Justo Henry OTR    Pain Assessment    Pain Assessment Coelho-Lauren FACES  -DN No/denies pain  -MD No/denies pain  -DN    Coelho-Lauren FACES Pain Rating 4  -DN      Pain Score 4  -DN      Post Pain Score 4  -DN      Pain Type Surgical pain  -DN      Pain Location --   G tube sight  -DN      Pain Intervention(s) Repositioned  -DN      Recorded by [DN] Justo Henry OTR [MD] Lara White, PT [DN] SABRINA Smith    Cognitive Assessment/Intervention    Current Cognitive/Communication Assessment impaired  -DN impaired  -MD     Orientation Status oriented to;person   -DN oriented to;person  -MD     Follows Commands/Answers Questions 50% of the time;able to follow single-step instructions;needs cueing;needs increased time  -DN 75% of the time;needs cueing;needs repetition  -MD     Personal Safety at risk behaviors demonstrated;decreased awareness, need for safety;decreased awareness, need for assist;decreased insight to deficits;unaware of functional deficits;unaware of consequences of deficits;unaware of cognitive deficits  -DN decreased insight to deficits  -MD     Personal Safety Interventions gait belt;fall prevention program maintained  -DN fall prevention program maintained;gait belt;muscle strengthening facilitated;nonskid shoes/slippers when out of bed;supervised activity  -MD     Recorded by [DN] Justo Henry OTR [MD] Lara White, PT     Bed Mobility, Assessment/Treatment    Bed Mob, Supine to Sit, [place]  not tested  -MD     Bed Mob, Sit to Supine, [place]  not tested  -MD     Recorded by  [MD] Lara White PT     Transfer Assessment/Treatment    Transfers, Chair-Bed [place] moderate assist (50% patient effort);verbal cues required;set up required  -DN      Transfers, Sit-Stand [place]  verbal cues required;contact guard assist;minimum assist (75% patient effort)  -MD     Transfers, Stand-Sit [place]  verbal cues required;contact guard assist;minimum assist (75% patient effort)  -MD     Toilet Transfer, [place] minimum assist (75% patient effort);verbal cues required;nonverbal cues required (demo/gesture)  -DN      Toilet Transfer, Assistive Device wheelchair;bedside commode without drop arms  -DN      Transfer, Safety Issues weight-shifting ability decreased;step length decreased;sequencing ability decreased;balance decreased during turns  -DN weight-shifting ability decreased;step length decreased;sequencing ability decreased;balance decreased during turns  -MD     Transfer, Impairments strength decreased;impaired balance;coordination  impaired;motor control impaired;pain  -DN strength decreased;impaired balance;coordination impaired;motor control impaired;muscle tone abnormal;impaired vision  -MD     Transfer, Comment  car transfer: Min A  -MD     Recorded by [JOSE ALBERTO] SABRINA Smith [MD] Lara White PT     Gait Assessment/Treatment    Gait, [place] Level  verbal cues required;minimum assist (75% patient effort)  -MD     Gait, Distance (Feet)  80   x1 and 40` x1  -MD     Gait, Gait Deviations  bilateral:;luke decreased;forward flexed posture;step length decreased;left:;antalgic;right:;decreased heel strike;toe-to-floor clearance decreased  -MD     Gait, Safety Issues  step length decreased;weight-shifting ability decreased  -MD     Gait, Impairments  strength decreased;impaired balance;coordination impaired;motor control impaired  -MD     Recorded by  [MD] Lara White PT     Upper Body Bathing Assessment/Training    UB Bathing Assess/Train, Position sitting;sink side  -DN      UB Bathing Assess/Train, [place] Level hand over hand;maximum assist (25% patient effort);verbal cues required;nonverbal cues required (demo/gesture);set up required  -DN      UB Bathing Assess/Train, Impairments strength decreased;coordination impaired;motor control impaired;impaired vision  -DN      Recorded by [JOSE ALBERTO] SABRINA Smith      Lower Body Bathing Assessment/Training    LB Bathing Assess/Train, Position sitting;standing  -DN      LB Bathing Assess/Train, [place] Level dependent (less than 25% patient effort);verbal cues required;nonverbal cues required (demo/gesture);set up required  -DN      LB Bathing Assess/Train, Impairments impaired balance;strength decreased;motor control impaired;impaired vision  -DN      Recorded by [JOSE ALBERTO] SABRINA Smith      Upper Body Dressing Assessment/Training    UB Dressing Assess/Train, Clothing Type donning:;doffing:;pull over;t-shirt  -DN      UB Dressing Assess/Train, Assist Device pascual technique  -DN      UB  Dressing Assess/Train, Position sitting  -DN      UB Dressing Assess/Train, [place] maximum assist (25% patient effort);verbal cues required;nonverbal cues required (demo/gesture)  -DN      UB Dressing Assess/Train, Impairments strength decreased;coordination impaired;motor control impaired  -DN      Recorded by [JOSE ALBERTO] SABRINA Smith      Lower Body Dressing Assessment/Training    LB Dressing Assess/Train, Clothing Type doffing:;donning:;pants;shoes;socks  -DN      LB Dressing Assess/Train, Position sitting;standing  -DN      LB Dressing Assess/Train, [place] dependent (less than 25% patient effort);2 person assist required  -DN      LB Dressing Assess/Train, Impairments motor control impaired;coordination impaired;impaired balance;strength decreased  -DN      Recorded by [JOSE ALBERTO] SABRINA Smith      Toileting Assessment/Training    Toileting Assess/Train, Assistive Device grab bars;bedside commode  -DN      Toileting Assess/Train, Position sitting;standing  -DN      Toileting Assess/Train, [place] Level dependent (less than 25% patient effort);2 person assist required;verbal cues required;nonverbal cues required (demo/gesture)  -DN      Toileting Assess/Train, Impairments strength decreased;impaired balance;coordination impaired;motor control impaired  -DN      Recorded by [DN] SABRINA Smith      Therapy Exercises    Bilateral Lower Extremities  AROM:;20 reps;supine;ankle pumps/circles;hip abduction/adduction;heel slides;quad sets;SAQ;SLR  -MD     Right Upper Extremity   PROM:;AAROM:;15 reps;supine;elbow flexion/extension;pronation/supination;shoulder abduction/adduction;shoulder circles;shoulder extension/flexion;shoulder ER/IR;shoulder horizontal abd/add;shoulder protraction/retraction  -DN    Recorded by  [MD] Lara White, PT [DN] SABRINA Smith    Neuromuscular Re-education    Neuromuscular Re-Ed Techniques   Weight bearing RUE;Facilitation isolated movement ([place]);Gross motor task-  grasp/release  -DN    Facilitation Isolated Movement   right:;scapula;shoulder;forearm;elbow;wrist;finger;thumb  -DN    Detail (Neuromuscular Re-Education)   completed AAROM/PROM, followed by active movement of RUE to grab cups and place with min a:  Pt now jose alejandro to actively move RUE  in all planes at varous degrees  -DN    Recorded by   [DN] SABRINA Smith    Positioning and Restraints    Pre-Treatment Position in bed  -DN in bed  -MD in bed  -DN    Post Treatment Position bed  -DN bed  -MD bed  -DN    In Bed supine;with family/caregiver  -DN supine;call light within reach;with family/caregiver  -MD supine;call light within reach;encouraged to call for assist;with family/caregiver  -DN    Recorded by [DN] SABRINA Smith [MD] Lara White, PT [DN] Justo Henry OTR      01/31/18 0900 01/30/18 1540 01/30/18 1221    Rehab Assessment/Intervention    Discipline speech language pathologist  -SL occupational therapist  -DN occupational therapist  -DN    Document Type therapy note (daily note)  -SL therapy note (daily note)  -DN therapy note (daily note)  -DN    Subjective Information agree to therapy  -SL no complaints;agree to therapy  -DN agree to therapy;no complaints  -DN    Patient Effort, Rehab Treatment fair  -SL good  -DN good  -DN    Symptoms Noted During/After Treatment fatigue  -SL fatigue  -DN fatigue  -DN    Precautions/Limitations NPO;fall precautions  -SL fall precautions;NPO;swallowing precautions  -DN fall precautions;NPO;swallowing precautions  -DN    Precautions/Limitations, Vision  vision impairment, right  -DN vision impairment, right  -DN    Patient Response to Treatment pt very fatiqued during session, closing eyes- needed cues to stay awake and participate;   -SL      Recorded by [SL] Malia Turner, MS CCC-SLP [DN] Justo Henry OTR [DN] Justo Henry OTR    Pain Assessment    Pain Assessment  Coelho-Lauren FACES  -DN Coelho-Baker FACES  -DN    Coelho-Lauren FACES Pain Rating  2  -DN 2  -DN    Pain  Score  2  -DN 2  -DN    Pain Type  Surgical pain  -DN Surgical pain  -DN    Pain Location  --   G-Tube placement sight  -DN --   g tube sight  -DN    Pain Intervention(s)  Repositioned  -DN     Recorded by  [DN] SABRINA Smith [DN] SABRINA Smith    Vision Assessment/Intervention    Visual Impairment  visual impairment, right  -DN visual impairment, right;inattention to the right;right neglect  -DN    Recorded by  [DN] SABRINA Smith [DN] SABRINA Smith    Cognitive Assessment/Intervention    Current Cognitive/Communication Assessment impaired  -SL impaired  -DN impaired  -DN    Orientation Status oriented to;person  -SL person  -DN oriented to;person  -DN    Follows Commands/Answers Questions needs increased time;needs cueing   did not follow any 1 step verbal command appropriately  -SL 25% of the time  -DN 25% of the time;able to follow single-step instructions;needs cueing;needs repetition;needs increased time  -DN    Personal Safety  decreased awareness, need for assist;decreased awareness, need for safety;decreased insight to deficits;fully aware of deficits;unaware of cognitive deficits;unaware of consequences of deficits;unaware of functional deficits;one on one supervision required for safety  -DN decreased awareness, need for assist;decreased awareness, need for safety;decreased insight to deficits;at risk behaviors demonstrated;one on one supervision required for safety  -DN    Personal Safety Interventions  gait belt;fall prevention program maintained;supervised activity  -DN gait belt;muscle strengthening facilitated;supervised activity  -DN    Recorded by [SL] Malia Turner MS CCC-SLP [DN] SABRINA Smith [DN] SABRINA Smith    Improve ability to comprehend words/phrases/sentences    Improve ability to comprehend words/phrases/sentences through: identify pictures, field of  -SL      Ability to Comprehend Words/Phrases/Sentences Progress 40%;without cues  -SL      Comments: Improve  ability to comprehend words/phrases/sentences piture id given name and descriptive cues  -SL      Recorded by [SL] Malia Turner MS CCC-SLP      Improve ability to follow directions    Ability to Follow Directions Progress 0%;without cues;50%;with consistent cues;following model   visual model and tactile cues needed  -SL      Comments: Improve ability to follow directions pt very perseverative; poor attn; drowsy  -SL      Recorded by [SL] Malia Turner MS CCC-SLP      Improve word retrieval skills    Comments: Improve word retrieval skills 80% for counting, days, and months with cues for initiation and intermittently during sequence  -SL      Recorded by [SL] Malia Turner MS CCC-SLP      Improve motor planning    Improve motor planning to reduce apraxia by: imitating vowels;imitating mouth postures  -SL      Motor Planning Progress 60%;70%;with inconsistent cues   random vowel imitation  -SL      Comments: Improve motor planning to Comments: Reduce apraxia unable to protrude tongue, lateralize or elevate; could pucker and smile- r side facial droop remains  -SL      Recorded by [SL] Malia Turner MS CCC-SLP      Transfer Assessment/Treatment    Transfers, Chair-Bed [place]   minimum assist (75% patient effort);verbal cues required  -DN    Toilet Transfer, [place]   minimum assist (75% patient effort);verbal cues required  -DN    Toilet Transfer, Assistive Device   wheelchair;bedside commode without drop arms  -DN    Transfer, Safety Issues   weight-shifting ability decreased;step length decreased;sequencing ability decreased;balance decreased during turns  -DN    Transfer, Impairments   strength decreased;impaired balance;coordination impaired;motor control impaired;muscle tone abnormal;impaired vision  -DN    Recorded by   [DN] SABRINA Smith    Upper Body Bathing Assessment/Training    UB Bathing Assess/Train, Position   sitting;sink side  -DN    UB Bathing Assess/Train, [place] Level   maximum assist  (25% patient effort);set up required;supervision required;verbal cues required  -DN    UB Bathing Assess/Train, Impairments   strength decreased;sensation decreased;impaired balance;coordination impaired;motor control impaired;impaired vision  -DN    Recorded by   [JOSE ALBERTO] SABRINA Smith    Lower Body Bathing Assessment/Training    LB Bathing Assess/Train, Comment   already bathed after commode  -DN    Recorded by   [JOSE ALBERTO] SABRINA Smith    Upper Body Dressing Assessment/Training    UB Dressing Assess/Train, Clothing Type   doffing:;donning:;pull over;t-shirt  -DN    UB Dressing Assess/Train, Assist Device   pascual technique  -DN    UB Dressing Assess/Train, Position   sitting  -DN    UB Dressing Assess/Train, [place]   maximum assist (25% patient effort);verbal cues required;nonverbal cues required (demo/gesture);set up required  -DN    UB Dressing Assess/Train, Impairments   strength decreased;impaired balance;coordination impaired;motor control impaired;impaired vision  -DN    Recorded by   [JOSE ALBERTO] SABRINA Smith    Lower Body Dressing Assessment/Training    LB Dressing Assess/Train, Comment   already completed in am  -DN    Recorded by   [JOSE ALBERTO] SABRINA Smith    Toileting Assessment/Training    Toileting Assess/Train, Assistive Device   bedside commode  -DN    Toileting Assess/Train, Position   sitting;standing  -DN    Toileting Assess/Train, Indepen Level   dependent (less than 25% patient effort);verbal cues required;nonverbal cues required (demo/gesture)  -DN    Toileting Assess/Train, Impairments   motor control impaired;coordination impaired;impaired balance;strength decreased;sensation decreased  -DN    Recorded by   [JOSE ALBERTO] SABRINA Smith    Therapy Exercises    Right Upper Extremity  AROM:;10 reps;supine;elbow flexion/extension;pronation/supination;shoulder abduction/adduction;shoulder extension/flexion;shoulder circles;shoulder ER/IR;shoulder horizontal abd/add;shoulder protraction/retraction   -DN     Recorded by  [DN] SABRINA Smith     Neuromuscular Re-education    Neuromuscular Re-Ed Techniques  Weight bearing RUE;Facilitation isolated movement ([place]);Functional Movement Patterns;Gross motor task- grasp/release  -DN     Facilitation Isolated Movement  right:;scapula;shoulder;elbow;forearm;wrist;finger;thumb  -JOSE ALBERTO     Detail (Neuromuscular Re-Education)  pt presents with shoulder, elbow, forearm, wrist and finger movements WFL with delay and motor planning issues, and R thumb is trace movememnt  -DN     Recorded by  [JOSE ALBERTO] SABRINA Smith     Positioning and Restraints    Pre-Treatment Position  in bed  -DN --   sitting in w/c  -DN    Post Treatment Position  bed  -DN bed  -DN    In Bed  encouraged to call for assist;call light within reach;with family/caregiver  -DN supine;exit alarm on;with family/caregiver;encouraged to call for assist;call light within reach  -DN    Recorded by  [DN] SABRINA Smith [DN] SABRINA Smith      01/30/18 1025 01/30/18 0820 01/29/18 1535    Rehab Assessment/Intervention    Discipline speech language pathologist  -SL physical therapist  -MD occupational therapist  -DN    Document Type therapy note (daily note)  -WING therapy note (daily note)  -MD therapy note (daily note)  -DN    Subjective Information no complaints  -WING agree to therapy;complains of;fatigue  -MD no complaints;agree to therapy  -DN    Patient Effort, Rehab Treatment good  -SL good  -MD good  -DN    Treatment Not Performed   patient unavailable for treatment  -DN    Treatment Not Performed, Comment   pt missed PM tx due to out for SX for G-tube placement  -DN    Symptoms Noted During/After Treatment fatigue  -SL none  -MD     Precautions/Limitations NPO;fall precautions  -SL fall precautions  -MD NPO;fall precautions;swallowing precautions  -DN    Precautions/Limitations, Vision   vision impairment, right  -DN    Equipment Issued to Patient  gait belt  -MD     Recorded by [SL] Malia Turner, MS  CCC-SLP [MD] Lara White, PT [DN] Justo Henry OTR    Pain Assessment    Pain Assessment  No/denies pain  -MD     Recorded by  [MD] Lara White PT     Vision Assessment/Intervention    Visual Impairment  visual impairment, right;inattention to the right;needs cues to attend visually;decreased visual tracking;right neglect  -MD     Visual Field  right visual field impairment  -MD     Recorded by  [MD] Lara White PT     Cognitive Assessment/Intervention    Current Cognitive/Communication Assessment impaired  -SL impaired  -MD impaired  -DN    Orientation Status oriented to;person  -SL oriented to;person  -MD     Follows Commands/Answers Questions 25% of the time;50% of the time;able to follow single-step instructions;needs increased time;needs cueing   needs visual cues/tactile cues  -SL 50% of the time;needs cueing;needs increased time;needs repetition  -MD able to follow single-step instructions;25% of the time;needs repetition;needs increased time;needs cueing  -DN    Personal Safety  decreased awareness, need for assist;decreased awareness, need for safety;decreased insight to deficits  -MD mild impairment;at risk behaviors demonstrated;decreased awareness, need for safety;decreased awareness, need for assist;decreased insight to deficits;unaware of cognitive deficits;unaware of consequences of deficits;unaware of functional deficits  -DN    Personal Safety Interventions  gait belt;muscle strengthening facilitated;nonskid shoes/slippers when out of bed;supervised activity;fall prevention program maintained  -MD fall prevention program maintained;gait belt;supervised activity  -DN    Recorded by [SL] Malia Turner MS CCC-SLP [MD] Lara White PT [DN] SABRINA Smith    Improve ability to comprehend words/phrases/sentences    Ability to Comprehend Words/Phrases/Sentences Progress 50%;without cues   picture (w/word written underneath stim) ID - [id number]  -SL      Recorded by [SL] Malia Turner MS CCC-SLP      Improve ability  to follow directions    Ability to Follow Directions Progress 20%;without cues;50%;with consistent cues;following model  -SL      Recorded by [SL] Malia Turner MS CCC-SLP      Improve ability to comprehend questions    Improve ability to comprehend questions: simple yes/no questions  -SL      Ability to Comprehend Questions Progress 40%;50%;without cues  -SL      Recorded by [SL] Malia Turner MS CCC-SLP      Improve word retrieval skills    Word Retrieval Skills Progress 0%;without cues;50%;with consistent cues;following model   naming  -SL      Comments: Improve word retrieval skills 40% - phrase completions w/1 word  -SL      Recorded by [SL] Malia Turner MS CCC-SLP      Improve ability to construct phrase and sentence level responses    Comments: Improve ability to construct phrase and sentence level responses 30- 50% for songs, rhymes; counting, days, months, and abc..- 80%  -SL      Recorded by [SL] Malia Turner MS CCC-SLP      Improve motor planning    Improve motor planning to reduce apraxia by: imitating vowels;following isolated oral commands  -SL      Motor Planning Progress 80%;90%;with inconsistent cues;following model   vowels  -SL      Comments: Improve motor planning to Comments: Reduce apraxia simple cvc combinations w/max cues-20%  -SL      Recorded by [SL] Malia Turner MS CCC-SLP      Improve planning and execution of connected speech    Comments: Improve planning and execution of connected speech still has difficulty w/oral motor mvmt imitation, but could pucker, smile, and lateralize tongu w/visual cues  -SL      Recorded by [SL] Malia Turner MS CCC-SLP      Bed Mobility, Assessment/Treatment    Bed Mob, Supine to Sit, [place]  verbal cues required;minimum assist (75% patient effort);moderate assist (50% patient effort);2 person assist required  -MD     Bed Mob, Sit to Supine, [place]  not tested  -MD     Bed Mobility, Safety Issues  cognitive deficits limit understanding;decreased use of arms for  pushing/pulling;decreased use of legs for bridging/pushing  -MD     Bed Mobility, Impairments  strength decreased;impaired balance;coordination impaired;motor control impaired  -MD     Recorded by  [MD] Lara White PT     Transfer Assessment/Treatment    Transfers, Bed-Chair [place]  verbal cues required;contact guard assist  -MD     Transfers, Chair-Bed [place]  not tested  -MD minimum assist (75% patient effort);verbal cues required;nonverbal cues required (demo/gesture)  -DN    Transfers, Bed-Chair-Bed, Assist Device  bed rails  -MD     Transfers, Sit-Stand [place]  verbal cues required;minimum assist (75% patient effort)  -MD     Transfers, Stand-Sit [place]  verbal cues required;minimum assist (75% patient effort)  -MD     Toilet Transfer, [place]   minimum assist (75% patient effort);nonverbal cues required (demo/gesture);verbal cues required  -DN    Toilet Transfer, Assistive Device   elevated toilet seat  -DN    Transfer, Safety Issues  weight-shifting ability decreased;step length decreased;balance decreased during turns  -MD weight-shifting ability decreased;step length decreased;balance decreased during turns  -DN    Transfer, Impairments  strength decreased;sensation decreased;ROM decreased;impaired balance;coordination impaired;motor control impaired  -MD strength decreased;sensation decreased;ROM decreased;impaired balance;coordination impaired;motor control impaired  -DN    Recorded by  [MD] Lara White, PT [DN] SABRINA Smith    Gait Assessment/Treatment    Gait, [place] Level  verbal cues required;contact guard assist;minimum assist (75% patient effort)  -MD     Gait, Assistive Device  other (see comments)   HHA  -MD     Gait, Distance (Feet)  80   x2  -MD     Gait, Gait Deviations  bilateral:;luke decreased;forward flexed posture;step length decreased;left:;antalgic;right:;decreased heel strike;weight-shifting ability decreased;narrow base;limb motion velocity  decreased  -MD     Gait, Safety Issues  step length decreased;weight-shifting ability decreased;sequencing ability decreased  -MD     Gait, Impairments  strength decreased;impaired balance;coordination impaired;motor control impaired  -MD     Recorded by  [MD] Lara White, PT     Upper Body Bathing Assessment/Training    UB Bathing Assess/Train, Position   sitting;sink side  -DN    UB Bathing Assess/Train, [place] Level   moderate assist (50% patient effort);verbal cues required;nonverbal cues required (demo/gesture);set up required  -DN    UB Bathing Assess/Train, Impairments   strength decreased;impaired balance;coordination impaired;motor control impaired;impaired vision;sensation decreased  -DN    Recorded by   [DN] Justo Henry OTR    Lower Body Bathing Assessment/Training    LB Bathing Assess/Train, Position   sitting;standing  -DN    LB Bathing Assess/Train, [place] Level   maximum assist (25% patient effort);verbal cues required;nonverbal cues required (demo/gesture);set up required  -DN    LB Bathing Assess/Train, Impairments   impaired balance;coordination impaired;motor control impaired;strength decreased;impaired vision;sensation decreased  -DN    Recorded by   [JOSE ALBERTO] Justo Henry OTR    Upper Body Dressing Assessment/Training    UB Dressing Assess/Train, Comment   pt to wear gown only due to sx scheduled in pm  -DN    Recorded by   [JOSE ALBERTO] SABRINA Smith    Lower Body Dressing Assessment/Training    LB Dressing Assess/Train, Comment   Gown only  -DN    Recorded by   [DN] SABRINA Smith    Toileting Assessment/Training    Toileting Assess/Train, Assistive Device   raised toilet seat;grab bars  -DN    Toileting Assess/Train, Position   sitting;standing  -DN    Toileting Assess/Train, Indepen Level   dependent (less than 25% patient effort);nonverbal cues required (demo/gesture);verbal cues required  -DN    Toileting Assess/Train, Impairments   motor control impaired;coordination  impaired;impaired balance;strength decreased;sensation decreased;impaired vision  -DN    Recorded by   [JOSE ALBERTO] SABRINA Smith    Therapy Exercises    Bilateral Lower Extremities  AROM:;20 reps;supine;ankle pumps/circles;hip abduction/adduction;heel slides;quad sets;SAQ;SLR  -MD     Recorded by  [MD] Lara White PT     Positioning and Restraints    Pre-Treatment Position  in bed  -MD --   w/c  -DN    Post Treatment Position  bed  -MD bed  -DN    In Bed  supine;call light within reach;with family/caregiver  -MD     Recorded by  [MD] Lara White PT [DN] SABRINA Smith      User Key  (r) = Recorded By, (t) = Taken By, (c) = Cosigned By    Initials Name Effective Dates    SL Mlaia Turner MS CCC-SLP 04/13/15 -     SABRINA Nolasco 04/13/15 -     MD Lara White PT 12/01/15 -           PT Recommendation and Plan  Anticipated Equipment Needs At Discharge: gait belt  Anticipated Discharge Disposition: home with home health  Planned Therapy Interventions: balance training, bed mobility training, gait training, home exercise program, patient/family education, strengthening, stair training, transfer training  PT Frequency: 2 times/day  Plan of Care Review  Plan Of Care Reviewed With: patient  Progress: improving  Outcome Summary/Follow up Plan: Pt will require light weight WC for safe and independent house hold mobility.  Pt is currently unable to use a RWx due to R UE weakness and is unable to use a cane due to impaired congitive processing with regards to sequencing with a cane.         Time Calculation:         PT Charges       02/01/18 1501 02/01/18 0916       Time Calculation    Start Time 1430  -MD 0800  -MD     Stop Time 1500  -MD 0830  -MD     Time Calculation (min) 30 min  -MD 30 min  -MD     PT Received On  02/01/18  -MD       User Key  (r) = Recorded By, (t) = Taken By, (c) = Cosigned By    Initials Name Provider Type    MD Lara White, PT Physical Therapist          Therapy Charges for Today     Code Description  Service Date Service Provider Modifiers Qty    61372943166 HC PT THER PROC EA 15 MIN 1/31/2018 Lara White, PT GP 4    09572647306 HC PT THER PROC EA 15 MIN 1/31/2018 Lara White, PT GP 4    20688998468 HC PT THER PROC EA 15 MIN 2/1/2018 Lara White, PT GP 4               PT Discharge Summary  Anticipated Discharge Disposition: home with home health  Reason for Discharge: Discharge from facility  Outcomes Achieved: Refer to plan of care for updates on goals achieved  Discharge Destination: Home with assist, Home with home health    Lara White, PT  2/1/2018

## 2018-02-01 NOTE — PLAN OF CARE
Problem: Patient Care Overview (Adult)  Goal: Plan of Care Review  Outcome: Ongoing (interventions implemented as appropriate)   02/01/18 4153   Outcome Evaluation   Outcome Summary/Follow up Plan pt  is independent with assist of pt with adls and commode.tub transfers and AAROM R UE   Coping/Psychosocial Response Interventions   Plan Of Care Reviewed With spouse

## 2018-02-01 NOTE — PLAN OF CARE
Problem: Patient Care Overview (Adult)  Goal: Plan of Care Review  Outcome: Ongoing (interventions implemented as appropriate)   02/01/18 1214   Outcome Evaluation   Outcome Summary/Follow up Plan Pt complained of pain once this morning, tylenol given per gtube.  trained on lovenox injections and tube feeds.    Patient Care Overview   Progress improving   Coping/Psychosocial Response Interventions   Plan Of Care Reviewed With patient       Problem: Stroke (Ischemic) (Adult)  Goal: Signs and Symptoms of Listed Potential Problems Will be Absent or Manageable (Stroke)  Outcome: Ongoing (interventions implemented as appropriate)      Problem: Fall Risk (Adult)  Goal: Absence of Falls  Outcome: Ongoing (interventions implemented as appropriate)      Problem: Pressure Ulcer Risk (Jon Scale) (Adult,Obstetrics,Pediatric)  Goal: Skin Integrity  Outcome: Ongoing (interventions implemented as appropriate)      Problem: Nutrition, Enteral (Adult)  Goal: Signs and Symptoms of Listed Potential Problems Will be Absent or Manageable (Nutrition, Enteral)  Outcome: Ongoing (interventions implemented as appropriate)

## 2018-02-01 NOTE — PROGRESS NOTES
Inpatient Rehabilitation Functional Measures Assessment    Functional Measures  OTTONIEL Eating:  [facility] Grooming: [facility] Bathing:  [facility] Upper Body Dressing:  Branch  [facility] Lower Body Dressing:  [place]anch  [facility] Toileting:  [facility] Bladder Management  Level of Assistance:  [place]  Frequency/Number of Accidents this Shift:  [facility] Bowel Management  Level of Assistance: [place]  Frequency/Number of Accidents this Shift: [place]    [facility] Bed/Chair/Wheelchair Transfer:  [facility] Toilet Transfer:  [facility] Tub/Shower Transfer:  [place]    Previously Documented Mode of Locomotion at Discharge: Field  OTTONIEL Expected Mode of Locomotion at Discharge: [facility] Walk/Wheelchair:  [facility] Stairs:  [facility] Comprehension:  Auditory comprehension is the usual mode. Patient does not  comprehend complex/abstract information in their primary language without  assistance from a helper. Comprehension Score = 4, Minimal Prompting. Patient  comprehends basic daily needs or ideas 75-90% of the time. Patient requires  minimal/occasional prompting. No assistive devices were required.  OTTONIEL Expression:  Vocal expression is the usual mode. Patient does not express  complex/abstract information in their primary language without a helper.  Expression Score = 3, Moderate Prompting. Patient expresses basic daily needs or  ideas 50-74% of the time. Patient requires moderate/some prompting. No assistive  devices were required.  OTTONIEL Social Interaction:  Social Interaction Score = 6, Modified Independent.  Patient is modified independent for social interaction, requiring: Requires  additional time.  OTTONIEL Problem Solving:  Activity was not observed.  OTTONIEL Memory:  Memory Score = 4, Minimal Prompting. Patient recognizes and  remembers 75-90% of the time. Patient requires minimal/occasional prompting for  memory for the following: Inability to follow multi-step commands.    Therapy Mode Minutes  Occupational Therapy:  Branch  Physical Therapy: Branch  Speech Language Pathology:  Branch    Signed by: Becky Acuña RN

## 2018-02-01 NOTE — PROGRESS NOTES
Inpatient Rehabilitation Functional Measures Assessment    Functional Measures  OTTONIEL Eating:  Eating Score = 4, Minimal Assistance. Patient performs 75% or more  of parenteral or gastrostomy feeding tasks.  [facility] Grooming: [facility] Bathing:  [facility] Upper Body Dressing:  [facility] Lower Body Dressing:  [facility] Toileting:  Toileting Score = 5.  Patient is supervision/set-up for  toileting, requiring: No assistive devices were required.    OTTONIEL Bladder Management  Level of Assistance:  Bladder Score = 5.  Patient is supervision/set-up for  bladder management, requiring: No assistive devices were required.  Frequency/Number of Accidents this Shift:  Bladder accidents this shift:  0 .  Patient has not had an accident, but used a device/medication this shift  requirin .    OTTONIEL Bowel Management  Level of Assistance: Bowel Score = 5.  Patient is supervision/set-up for bowel  management, requiring: No assistive devices were required.  Frequency/Number of Accidents this Shift: Bowel accidents this shift: 0 .  Patient has not had an accident this shift.    OTTONIEL Bed/Chair/Wheelchair Transfer:  Bed/chair/wheelchair Transfer Score = 4.  Patient performs 75% or more of effort and minimal assistance (little/incidental  help/lifting of one limb/steadying) for transferring to and from the  bed/chair/wheelchair, requiring: Contact guard. No assistive devices were  required.  OTTONIEL Toilet Transfer:  Toilet Transfer Score = 4.  Patient performs 75% or more  of effort and minimal assistance (little/incidental help/steadying) for  transferring to and from the toilet/commode, requiring: No assistive devices  were required.  OTTONIEL Tub/Shower Transfer:  [place]    Previously Documented Mode of Locomotion at Discharge: [place]  OTTONIEL Expected Mode of Locomotion at Discharge: [facility] Walk/Wheelchair:  [facility] Stairs:  [facility] Comprehension:  [facility] Expression:  [facility] Social Interaction:  [facility] Problem  Solving:  Branch  OTTONIEL Memory:  Branch    Therapy Mode Minutes  Occupational Therapy: Branch  Physical Therapy: Branch  Speech Language Pathology:  Branch    Signed by: SCOTTY Wheat

## 2018-02-01 NOTE — PROGRESS NOTES
Inpatient Rehabilitation Functional Measures Assessment and Plan of Care    Plan of Care  Updated Problems/Interventions  Field    Functional Measures  OTTONIEL Eating:  Eating did not occur because activity was unsafe for patient. .  OTTONIEL Grooming: [facility] Bathing:  [facility] Upper Body Dressing:  [facility] Lower Body Dressing:  [facility] Toileting:  [facility] Bladder Management  Level of Assistance:  [place]  Frequency/Number of Accidents this Shift:  [facility] Bowel Management  Level of Assistance: [place]  Frequency/Number of Accidents this Shift: [facility] Bed/Chair/Wheelchair Transfer:  [facility] Toilet Transfer:  [facility] Tub/Shower Transfer:  [place]    Previously Documented Mode of Locomotion at Discharge: Field  OTTONIEL Expected Mode of Locomotion at Discharge: [facility] Walk/Wheelchair:  [facility] Stairs:  [facility] Comprehension:  Auditory comprehension is the usual mode. Patient does not  comprehend complex/abstract information in their primary language without  assistance from a helper. Comprehension Score = 2, Maximal Prompting. Patient  comprehends basic daily needs 25-49% of the time. Patient understands simple  information via single words or gestures. Requires maximal/a lot of prompting  (most of the time). Patient requires the following assistive device(s): visual  cues .  OTTONIEL Expression:  Vocal expression is the usual mode. Patient does not express  complex/abstract information in their primary language without a helper.  Expression Score = 2, Maximal Prompting. Patient expresses basic daily needs  25-49% of the time. Patient uses only single words or gestures and requires  maximal/a lot of prompting (most of the time). Patient requires the following  assistive device(s): josué .  OTTONIEL Social Interaction:  Social Interaction Score = 6, Modified Independent.  Patient is modified independent for social interaction, requiring: Requires  additional time. Requires a structured  or modified environment.  OTTONIEL Problem Solving:  Activity was not observed.  OTTONIEL Memory:  Activity was not observed.    Therapy Mode Minutes  Occupational Therapy: Branch  Physical Therapy: Branch  Speech Language Pathology:  Individual: 60 minutes.    Signed by: CONNOR Kaur

## 2018-02-01 NOTE — THERAPY TREATMENT NOTE
Inpatient Rehabilitation - Speech Language Pathology Treatment Note  [facility]     Patient Name: Masha Porter  : 1960  MRN: [id number]  Today's Date: 2018         Admit Date: 1/3/2018    Visit Dx:      ICD-10-CM ICD-9-CM   1. Primary lung cancer, left C34.92 162.9   2. Oropharyngeal dysphagia R13.12 787.22     Patient Active Problem List   Diagnosis   • Expressive aphasia   • Dyslipidemia   • Cardioembolic stroke   • Diastolic CHF, chronic   • Aortic insufficiency   • Mitral regurgitation   • Atelectasis   • CVA (cerebral vascular accident)   • Lung cancer, lower lobe   • Bone metastasis   • Oropharyngeal dysphagia              Adult Rehabilitation Note       18 1030 18 0916 18 [id number]    Rehab Assessment/Intervention    Discipline speech language pathologist  -SL physical therapist  -MD speech language pathologist  -SL    Document Type therapy note (daily note)  -SL therapy note (daily note);discharge summary  -MD therapy note (daily note)  -SL    Subjective Information no complaints;agree to therapy  -SL no complaints;agree to therapy  -MD no complaints;agree to therapy  -SL    Patient Effort, Rehab Treatment good  -SL good  -MD adequate  -SL    Symptoms Noted During/After Treatment none  -SL none  -MD     Precautions/Limitations NPO;fall precautions  -SL fall precautions  -MD fall precautions;NPO  -SL    Specific Treatment Considerations   nasal O2 in place  -SL    Patient Response to Treatment performed much better today in tx- alert, awake for both sessions- not as fatiqued  -      Equipment Issued to Patient  gait belt  -MD     Recorded by [SL] Malia Turner MS CCC-SLP [MD] Lara White, PT [SL] Malia Turner MS CCC-SLP    Vision Assessment/Intervention    Visual Impairment  visual impairment, right  -MD     Visual Field  right visual field impairment  -MD     Recorded by  [MD] Lara White, PT     Cognitive Assessment/Intervention    Current Cognitive/Communication Assessment impaired   -SL impaired  -MD impaired  -SL    Orientation Status oriented to;person;place  -SL oriented to;person  -MD oriented to;person  -SL    Follows Commands/Answers Questions needs increased time;needs cueing  -SL 75% of the time;needs cueing;needs increased time;needs repetition  -MD needs cueing;needs increased time  -SL    Personal Safety  decreased insight to deficits  -MD     Personal Safety Interventions  fall prevention program maintained;gait belt;muscle strengthening facilitated;nonskid shoes/slippers when out of bed;supervised activity  -MD     Recorded by [SL] Malia Turner MS CCC-SLP [MD] Lara White, PT [SL] Malia Turner MS CCC-SLP    Improve ability to comprehend words/phrases/sentences    Improve ability to comprehend words/phrases/sentences through: identify pictures, field of  -SL      Ability to Comprehend Words/Phrases/Sentences Progress 60%;70%;with inconsistent cues  -SL      Recorded by [SL] Malia Turner MS CCC-SLP      Improve ability to comprehend questions    Improve ability to comprehend questions:   simple yes/no questions  -    Ability to Comprehend Questions Progress   60%;with inconsistent cues  -SL    Recorded by   [SL] Malia Turner MS CCC-SLP    Improve word retrieval skills    Word Retrieval Skills Progress   10%;without cues;60%;with consistent cues;following model   naming items; phonemic cues/imitative production beneficial  -    Comments: Improve word retrieval skills 100% for aut  tasks with min cues from slo ( needed for initiation and intermittently during longer production of months)- improved intelligibility of stimulus  -  80% for sent completions with one word with added phonemic cues  -SL    Recorded by [SL] Malia Turner MS CCC-SLP  [SL] Malia Turner MS CCC-SLP    Improve motor planning    Improve motor planning to reduce apraxia by: imitating mouth postures;imitating vowels  -      Motor Planning Progress 80%;with inconsistent cues  -SL      Recorded by [SL] Malia Turner MS CCC-SLP       Improve planning and execution of connected speech    Planning and Execution of Connected Speech Progress 50%;with inconsistent cues   response to ?- FELIX, level 1  -SL  40%;with consistent cues   songs/rhymes w/cues form slp  -SL    Comments: Improve planning and execution of connected speech 100% for production of words/phrases in unison with slp using melodic intonation; imporved clarity of productions; noted improved spont production of stimulus after slp asked question-  -SL      Recorded by [SL] Malia Turner MS CCC-SLP  [SL] Malia Turner MS CCC-SLP    Bed Mobility, Assessment/Treatment    Bed Mobility, Comment  Pt up in chair when PT arrived this am.  Pt spouse assisted pt out of bed and to chair.  -MD     Recorded by  [MD] Lara White PT     Transfer Assessment/Treatment    Transfers, Sit-Stand [place]  verbal cues required;contact guard assist;minimum assist (75% patient effort)  -MD     Transfers, Stand-Sit [place]  verbal cues required;contact guard assist;minimum assist (75% patient effort)  -MD     Transfers, Sit-Stand-Sit, Assist Device  other (see comments)   HHA  -MD     Transfer, Safety Issues  weight-shifting ability decreased;step length decreased;sequencing ability decreased;balance decreased during turns  -MD     Transfer, Impairments  strength decreased;impaired balance;coordination impaired;motor control impaired;pain  -MD     Recorded by  [MD] Lara White PT     Gait Assessment/Treatment    Gait, [place] Level  verbal cues required;minimum assist (75% patient effort)  -MD     Gait, Assistive Device  other (see comments)   HHA  -MD     Gait, Distance (Feet)  80  -MD     Gait, Gait Deviations  bilateral:;luke decreased;forward flexed posture;left:;antalgic;right:;decreased heel strike;toe-to-floor clearance decreased  -MD     Gait, Safety Issues  step length decreased  -MD     Gait, Impairments  strength decreased;impaired balance;coordination impaired;motor control impaired  -MD      Recorded by  [MD] Lara White PT     Stairs Assessment/Treatment    Number of Stairs  4  -MD     Stairs, Handrail Location  none  -MD     Stairs, [place] Level  minimum assist (75% patient effort);contact guard assist;2 person assist required  -MD [place], Technique Used  step to step (ascending);step to step (descending)  -MD     Stairs, Safety Issues  sequencing ability decreased  -MD     Stairs, Impairments  strength decreased;impaired balance;coordination impaired  -MD     Stairs, Comment  Practiced steps with spouse providing HHA, Min A and PT providing CGA due to first attempt and family teaching.  -MD     Recorded by  [MD] Lara White PT     Positioning and Restraints    Pre-Treatment Position  sitting in chair/recliner  -MD     Recorded by  [MD] Lara White PT       01/31/18 1543 01/31/18 1449 01/31/18 1203    Rehab Assessment/Intervention    Discipline occupational therapist  -DN physical therapist  -MD occupational therapist  -DN    Document Type therapy note (daily note)  -DN therapy note (daily note)  -MD therapy note (daily note)  -DN    Subjective Information agree to therapy;complains of;fatigue;pain  -DN agree to therapy;complains of;fatigue  -MD agree to therapy;complains of;fatigue  -DN    Patient Effort, Rehab Treatment  good  -MD good  -DN    Symptoms Noted During/After Treatment fatigue;shortness of breath;increased pain  -DN fatigue;shortness of breath  -MD     Symptoms Noted Comment  In am pt experienced SOA after walking 40ft.  O2 was 83% following a rest break of 2-3 min O2 was 88% RN was notified of these events.  -MD pt C/O fatigue, declined shower, oppeded to complete rue ROM activities instead  -DN    Precautions/Limitations fall precautions  -DN fall precautions  -MD     Equipment Issued to Patient  gait belt  -MD     Recorded by [DN] Justo Henry OTR [MD] Lara White PT [DN] Justo Henry OTR    Pain Assessment    Pain Assessment Coelho-Lauren FACES  -JOSE ALBERTO No/denies pain  -MD  No/denies pain  -DN    Coelho-Lauren FACES Pain Rating 4  -DN      Pain Score 4  -DN      Post Pain Score 4  -DN      Pain Type Surgical pain  -DN      Pain Location --   G tube sight  -DN      Pain Intervention(s) Repositioned  -DN      Recorded by [JOSE ALBERTO] SABRINA Smith [MD] Lara White PT [DN] SABRINA Smith    Cognitive Assessment/Intervention    Current Cognitive/Communication Assessment impaired  -DN impaired  -MD     Orientation Status oriented to;person  -DN oriented to;person  -MD     Follows Commands/Answers Questions 50% of the time;able to follow single-step instructions;needs cueing;needs increased time  -DN 75% of the time;needs cueing;needs repetition  -MD     Personal Safety at risk behaviors demonstrated;decreased awareness, need for safety;decreased awareness, need for assist;decreased insight to deficits;unaware of functional deficits;unaware of consequences of deficits;unaware of cognitive deficits  -DN decreased insight to deficits  -MD     Personal Safety Interventions gait belt;fall prevention program maintained  -DN fall prevention program maintained;gait belt;muscle strengthening facilitated;nonskid shoes/slippers when out of bed;supervised activity  -MD     Recorded by [DN] SABRINA Smith [MD] Lara White PT     Bed Mobility, Assessment/Treatment    Bed Mob, Supine to Sit, [place]  not tested  -MD     Bed Mob, Sit to Supine, [place]  not tested  -MD     Recorded by  [MD] Lara White PT     Transfer Assessment/Treatment    Transfers, Chair-Bed [place] moderate assist (50% patient effort);verbal cues required;set up required  -DN      Transfers, Sit-Stand [place]  verbal cues required;contact guard assist;minimum assist (75% patient effort)  -MD     Transfers, Stand-Sit [place]  verbal cues required;contact guard assist;minimum assist (75% patient effort)  -MD     Toilet Transfer, [place] minimum assist (75% patient effort);verbal cues required;nonverbal cues  required (demo/gesture)  -DN      Toilet Transfer, Assistive Device wheelchair;bedside commode without drop arms  -DN      Transfer, Safety Issues weight-shifting ability decreased;step length decreased;sequencing ability decreased;balance decreased during turns  -DN weight-shifting ability decreased;step length decreased;sequencing ability decreased;balance decreased during turns  -MD     Transfer, Impairments strength decreased;impaired balance;coordination impaired;motor control impaired;pain  -DN strength decreased;impaired balance;coordination impaired;motor control impaired;muscle tone abnormal;impaired vision  -MD     Transfer, Comment  car transfer: Min A  -MD     Recorded by [JOSE ALBERTO] SABRINA Smith [MD] Lara White PT     Gait Assessment/Treatment    Gait, [place] Level  verbal cues required;minimum assist (75% patient effort)  -MD     Gait, Distance (Feet)  80   x1 and 40` x1  -MD     Gait, Gait Deviations  bilateral:;luke decreased;forward flexed posture;step length decreased;left:;antalgic;right:;decreased heel strike;toe-to-floor clearance decreased  -MD     Gait, Safety Issues  step length decreased;weight-shifting ability decreased  -MD     Gait, Impairments  strength decreased;impaired balance;coordination impaired;motor control impaired  -MD     Recorded by  [MD] Lara White PT     Upper Body Bathing Assessment/Training    UB Bathing Assess/Train, Position sitting;sink side  -DN      UB Bathing Assess/Train, [place] Level hand over hand;maximum assist (25% patient effort);verbal cues required;nonverbal cues required (demo/gesture);set up required  -DN      UB Bathing Assess/Train, Impairments strength decreased;coordination impaired;motor control impaired;impaired vision  -DN      Recorded by [JOSE ALBERTO] SABRINA Smith      Lower Body Bathing Assessment/Training    LB Bathing Assess/Train, Position sitting;standing  -DN      LB Bathing Assess/Train, [place] Level dependent (less than  25% patient effort);verbal cues required;nonverbal cues required (demo/gesture);set up required  -DN      LB Bathing Assess/Train, Impairments impaired balance;strength decreased;motor control impaired;impaired vision  -DN      Recorded by [JOSE ALBERTO] SABRINA Smith      Upper Body Dressing Assessment/Training    UB Dressing Assess/Train, Clothing Type donning:;doffing:;pull over;t-shirt  -DN      UB Dressing Assess/Train, Assist Device pascual technique  -DN      UB Dressing Assess/Train, Position sitting  -DN      UB Dressing Assess/Train, [place] maximum assist (25% patient effort);verbal cues required;nonverbal cues required (demo/gesture)  -DN      UB Dressing Assess/Train, Impairments strength decreased;coordination impaired;motor control impaired  -DN      Recorded by [JOSE ALBERTO] SABRINA Smith      Lower Body Dressing Assessment/Training    LB Dressing Assess/Train, Clothing Type doffing:;donning:;pants;shoes;socks  -DN      LB Dressing Assess/Train, Position sitting;standing  -DN      LB Dressing Assess/Train, [place] dependent (less than 25% patient effort);2 person assist required  -DN      LB Dressing Assess/Train, Impairments motor control impaired;coordination impaired;impaired balance;strength decreased  -DN      Recorded by [JOSE ALBERTO] SABRINA Smith      Toileting Assessment/Training    Toileting Assess/Train, Assistive Device grab bars;bedside commode  -DN      Toileting Assess/Train, Position sitting;standing  -DN      Toileting Assess/Train, [place] Level dependent (less than 25% patient effort);2 person assist required;verbal cues required;nonverbal cues required (demo/gesture)  -DN      Toileting Assess/Train, Impairments strength decreased;impaired balance;coordination impaired;motor control impaired  -DN      Recorded by [JOSE ALBERTO] SABRINA Smith      Therapy Exercises    Bilateral Lower Extremities  AROM:;20 reps;supine;ankle pumps/circles;hip abduction/adduction;heel slides;quad sets;SAQ;SLR   -MD     Right Upper Extremity   PROM:;AAROM:;15 reps;supine;elbow flexion/extension;pronation/supination;shoulder abduction/adduction;shoulder circles;shoulder extension/flexion;shoulder ER/IR;shoulder horizontal abd/add;shoulder protraction/retraction  -DN    Recorded by  [MD] Lara White, PT [DN] SABRINA Smith    Neuromuscular Re-education    Neuromuscular Re-Ed Techniques   Weight bearing RUE;Facilitation isolated movement ([place]ascade);Gross motor task- grasp/release  -DN    Facilitation Isolated Movement   right:;scapula;shoulder;forearm;elbow;wrist;finger;thumb  -DN    Detail (Neuromuscular Re-Education)   completed AAROM/PROM, followed by active movement of RUE to grab cups and place with min a:  Pt now jose alejandro to actively move RUE  in all planes at varous degrees  -DN    Recorded by   [JOSE ALBERTO] SABRINA Smith    Positioning and Restraints    Pre-Treatment Position in bed  -DN in bed  -MD in bed  -DN    Post Treatment Position bed  -DN bed  -MD bed  -DN    In Bed supine;with family/caregiver  -DN supine;call light within reach;with family/caregiver  -MD supine;call light within reach;encouraged to call for assist;with family/caregiver  -DN    Recorded by [DN] SABRINA Smith [MD] Lara White, PT [DN] SABRINA Smith      01/31/18 0900 01/30/18 1540 01/30/18 1221    Rehab Assessment/Intervention    Discipline speech language pathologist  -SL occupational therapist  -DN occupational therapist  -DN    Document Type therapy note (daily note)  -SL therapy note (daily note)  -DN therapy note (daily note)  -DN    Subjective Information agree to therapy  -SL no complaints;agree to therapy  -DN agree to therapy;no complaints  -DN    Patient Effort, Rehab Treatment fair  -SL good  -DN good  -DN    Symptoms Noted During/After Treatment fatigue  -SL fatigue  -DN fatigue  -DN    Precautions/Limitations NPO;fall precautions  -SL fall precautions;NPO;swallowing precautions  -DN fall precautions;NPO;swallowing precautions   -DN    Precautions/Limitations, Vision  vision impairment, right  -DN vision impairment, right  -DN    Patient Response to Treatment pt very fatiqued during session, closing eyes- needed cues to stay awake and participate;   -SL      Recorded by [SL] Malia Turner MS CCC-SLP [DN] Justo Henry OTR [DN] SABRINA Smith    Pain Assessment    Pain Assessment  Coelho-Lauren FACES  -DN Coelho-Baker FACES  -DN    Coelho-Lauren FACES Pain Rating  2  -DN 2  -DN    Pain Score  2  -DN 2  -DN    Pain Type  Surgical pain  -DN Surgical pain  -DN    Pain Location  --   G-Tube placement sight  -DN --   g tube sight  -DN    Pain Intervention(s)  Repositioned  -DN     Recorded by  [DN] Justo Henry OTR [DN] SABRINA Smith    Vision Assessment/Intervention    Visual Impairment  visual impairment, right  -DN visual impairment, right;inattention to the right;right neglect  -DN    Recorded by  [DN] SABRINA Smith [DN] SABRINA Smith    Cognitive Assessment/Intervention    Current Cognitive/Communication Assessment impaired  -SL impaired  -DN impaired  -DN    Orientation Status oriented to;person  -SL person  -DN oriented to;person  -DN    Follows Commands/Answers Questions needs increased time;needs cueing   did not follow any 1 step verbal command appropriately  -SL 25% of the time  -DN 25% of the time;able to follow single-step instructions;needs cueing;needs repetition;needs increased time  -DN    Personal Safety  decreased awareness, need for assist;decreased awareness, need for safety;decreased insight to deficits;fully aware of deficits;unaware of cognitive deficits;unaware of consequences of deficits;unaware of functional deficits;one on one supervision required for safety  -DN decreased awareness, need for assist;decreased awareness, need for safety;decreased insight to deficits;at risk behaviors demonstrated;one on one supervision required for safety  -DN    Personal Safety Interventions  gait belt;fall prevention  program maintained;supervised activity  -DN gait belt;muscle strengthening facilitated;supervised activity  -DN    Recorded by [SL] Malia Turner MS CCC-SLP [DN] Justo Henry OTR [DN] SABRINA Smith    Improve ability to comprehend words/phrases/sentences    Improve ability to comprehend words/phrases/sentences through: identify pictures, field of  -SL      Ability to Comprehend Words/Phrases/Sentences Progress 40%;without cues  -SL      Comments: Improve ability to comprehend words/phrases/sentences piture id given name and descriptive cues  -SL      Recorded by [SL] Malia Turner MS CCC-SLP      Improve ability to follow directions    Ability to Follow Directions Progress 0%;without cues;50%;with consistent cues;following model   visual model and tactile cues needed  -SL      Comments: Improve ability to follow directions pt very perseverative; poor attn; drowsy  -SL      Recorded by [SL] Malia Turner MS CCC-SLP      Improve word retrieval skills    Comments: Improve word retrieval skills 80% for counting, days, and months with cues for initiation and intermittently during sequence  -SL      Recorded by [SL] Malia Turner MS CCC-SLP      Improve motor planning    Improve motor planning to reduce apraxia by: imitating vowels;imitating mouth postures  -SL      Motor Planning Progress 60%;70%;with inconsistent cues   random vowel imitation  -SL      Comments: Improve motor planning to Comments: Reduce apraxia unable to protrude tongue, lateralize or elevate; could pucker and smile- r side facial droop remains  -SL      Recorded by [SL] Malia Turner MS CCC-SLP      Transfer Assessment/Treatment    Transfers, Chair-Bed [place]   minimum assist (75% patient effort);verbal cues required  -DN    Toilet Transfer, [place]   minimum assist (75% patient effort);verbal cues required  -DN    Toilet Transfer, Assistive Device   wheelchair;bedside commode without drop arms  -DN    Transfer, Safety Issues   weight-shifting  ability decreased;step length decreased;sequencing ability decreased;balance decreased during turns  -DN    Transfer, Impairments   strength decreased;impaired balance;coordination impaired;motor control impaired;muscle tone abnormal;impaired vision  -DN    Recorded by   [JOSE ALBERTO] SABRINA Smith    Upper Body Bathing Assessment/Training    UB Bathing Assess/Train, Position   sitting;sink side  -DN    UB Bathing Assess/Train, [place] Level   maximum assist (25% patient effort);set up required;supervision required;verbal cues required  -DN    UB Bathing Assess/Train, Impairments   strength decreased;sensation decreased;impaired balance;coordination impaired;motor control impaired;impaired vision  -DN    Recorded by   [JOSE ALBERTO] SABRINA Smith    Lower Body Bathing Assessment/Training    LB Bathing Assess/Train, Comment   already bathed after commode  -DN    Recorded by   [JOSE ALBERTO] SABRINA Smith    Upper Body Dressing Assessment/Training    UB Dressing Assess/Train, Clothing Type   doffing:;donning:;pull over;t-shirt  -DN    UB Dressing Assess/Train, Assist Device   pascual technique  -DN    UB Dressing Assess/Train, Position   sitting  -DN    UB Dressing Assess/Train, [place]   maximum assist (25% patient effort);verbal cues required;nonverbal cues required (demo/gesture);set up required  -DN    UB Dressing Assess/Train, Impairments   strength decreased;impaired balance;coordination impaired;motor control impaired;impaired vision  -DN    Recorded by   [JOSE ALBERTO] SABRINA Smith    Lower Body Dressing Assessment/Training    LB Dressing Assess/Train, Comment   already completed in am  -DN    Recorded by   [JOSE ALBERTO] SABRINA Smith    Toileting Assessment/Training    Toileting Assess/Train, Assistive Device   bedside commode  -DN    Toileting Assess/Train, Position   sitting;standing  -DN    Toileting Assess/Train, [place] Level   dependent (less than 25% patient effort);verbal cues required;nonverbal cues required  (demo/gesture)  -DN    Toileting Assess/Train, Impairments   motor control impaired;coordination impaired;impaired balance;strength decreased;sensation decreased  -DN    Recorded by   [JOSE ALBERTO] SABRINA Smith    Therapy Exercises    Right Upper Extremity  AROM:;10 reps;supine;elbow flexion/extension;pronation/supination;shoulder abduction/adduction;shoulder extension/flexion;shoulder circles;shoulder ER/IR;shoulder horizontal abd/add;shoulder protraction/retraction  -DN     Recorded by  [JOSE ALBERTO] SABRINA Smith     Neuromuscular Re-education    Neuromuscular Re-Ed Techniques  Weight bearing RUE;Facilitation isolated movement ([place]ascade);Functional Movement Patterns;Gross motor task- grasp/release  -DN     Facilitation Isolated Movement  right:;scapula;shoulder;elbow;forearm;wrist;finger;thumb  -JOSE ALBERTO     Detail (Neuromuscular Re-Education)  pt presents with shoulder, elbow, forearm, wrist and finger movements WFL with delay and motor planning issues, and R thumb is trace movememnt  -DN     Recorded by  [JOSE ALBERTO] SABRINA Smith     Positioning and Restraints    Pre-Treatment Position  in bed  -DN --   sitting in w/c  -DN    Post Treatment Position  bed  -DN bed  -DN    In Bed  encouraged to call for assist;call light within reach;with family/caregiver  -DN supine;exit alarm on;with family/caregiver;encouraged to call for assist;call light within reach  -DN    Recorded by  [JOSE ALBERTO] SABRINA Smith [DN] SABRINA Smith      01/30/18 1025 01/30/18 0820 01/29/18 1535    Rehab Assessment/Intervention    Discipline speech language pathologist  -WING physical therapist  -MD occupational therapist  -DN    Document Type therapy note (daily note)  -WING therapy note (daily note)  -MD therapy note (daily note)  -DN    Subjective Information no complaints  -WING agree to therapy;complains of;fatigue  -MD no complaints;agree to therapy  -DN    Patient Effort, Rehab Treatment good  -SL good  -MD good  -DN    Treatment Not Performed   patient  unavailable for treatment  -DN    Treatment Not Performed, Comment   pt missed PM tx due to out for SX for G-tube placement  -DN    Symptoms Noted During/After Treatment fatigue  -SL none  -MD     Precautions/Limitations NPO;fall precautions  -SL fall precautions  -MD NPO;fall precautions;swallowing precautions  -DN    Precautions/Limitations, Vision   vision impairment, right  -DN    Equipment Issued to Patient  gait belt  -MD     Recorded by [SL] Malia Turner MS CCC-SLP [MD] Lara White PT [DN] Justo Henry OTR    Pain Assessment    Pain Assessment  No/denies pain  -MD     Recorded by  [MD] Lara White PT     Vision Assessment/Intervention    Visual Impairment  visual impairment, right;inattention to the right;needs cues to attend visually;decreased visual tracking;right neglect  -MD     Visual Field  right visual field impairment  -MD     Recorded by  [MD] Lara White PT     Cognitive Assessment/Intervention    Current Cognitive/Communication Assessment impaired  -SL impaired  -MD impaired  -DN    Orientation Status oriented to;person  -SL oriented to;person  -MD     Follows Commands/Answers Questions 25% of the time;50% of the time;able to follow single-step instructions;needs increased time;needs cueing   needs visual cues/tactile cues  -SL 50% of the time;needs cueing;needs increased time;needs repetition  -MD able to follow single-step instructions;25% of the time;needs repetition;needs increased time;needs cueing  -DN    Personal Safety  decreased awareness, need for assist;decreased awareness, need for safety;decreased insight to deficits  -MD mild impairment;at risk behaviors demonstrated;decreased awareness, need for safety;decreased awareness, need for assist;decreased insight to deficits;unaware of cognitive deficits;unaware of consequences of deficits;unaware of functional deficits  -DN    Personal Safety Interventions  gait belt;muscle strengthening facilitated;nonskid shoes/slippers when out of  bed;supervised activity;fall prevention program maintained  -MD fall prevention program maintained;gait belt;supervised activity  -DN    Recorded by [SL] Malia Turner MS CCC-SLP [MD] Lara White, PT [DN] SABRINA Smith    Improve ability to comprehend words/phrases/sentences    Ability to Comprehend Words/Phrases/Sentences Progress 50%;without cues   picture (w/word written underneath stim) ID - [id number]  -SL      Recorded by [SL] Malia Turner MS CCC-SLP      Improve ability to follow directions    Ability to Follow Directions Progress 20%;without cues;50%;with consistent cues;following model  -SL      Recorded by [SL] Malia Turner MS CCC-SLP      Improve ability to comprehend questions    Improve ability to comprehend questions: simple yes/no questions  -SL      Ability to Comprehend Questions Progress 40%;50%;without cues  -SL      Recorded by [SL] Malia Turner MS CCC-SLP      Improve word retrieval skills    Word Retrieval Skills Progress 0%;without cues;50%;with consistent cues;following model   naming  -SL      Comments: Improve word retrieval skills 40% - phrase completions w/1 word  -SL      Recorded by [SL] Malia Turner MS CCC-SLP      Improve ability to construct phrase and sentence level responses    Comments: Improve ability to construct phrase and sentence level responses 30- 50% for songs, rhymes; counting, days, months, and abc..- 80%  -SL      Recorded by [SL] Malia Turner MS CCC-SLP      Improve motor planning    Improve motor planning to reduce apraxia by: imitating vowels;following isolated oral commands  -SL      Motor Planning Progress 80%;90%;with inconsistent cues;following model   vowels  -SL      Comments: Improve motor planning to Comments: Reduce apraxia simple cvc combinations w/max cues-20%  -SL      Recorded by [SL] Malia Turner MS CCC-SLP      Improve planning and execution of connected speech    Comments: Improve planning and execution of connected speech still has difficulty w/oral motor mvmt  imitation, but could pucker, smile, and lateralize tongu w/visual cues  -SL      Recorded by [SL] Malia Turner MS CCC-SLP      Bed Mobility, Assessment/Treatment    Bed Mob, Supine to Sit, [place]  verbal cues required;minimum assist (75% patient effort);moderate assist (50% patient effort);2 person assist required  -MD     Bed Mob, Sit to Supine, [place]  not tested  -MD     Bed Mobility, Safety Issues  cognitive deficits limit understanding;decreased use of arms for pushing/pulling;decreased use of legs for bridging/pushing  -MD     Bed Mobility, Impairments  strength decreased;impaired balance;coordination impaired;motor control impaired  -MD     Recorded by  [MD] Lara White, PT     Transfer Assessment/Treatment    Transfers, Bed-Chair [place]  verbal cues required;contact guard assist  -MD     Transfers, Chair-Bed [place]  not tested  -MD minimum assist (75% patient effort);verbal cues required;nonverbal cues required (demo/gesture)  -DN    Transfers, Bed-Chair-Bed, Assist Device  bed rails  -MD     Transfers, Sit-Stand [place]  verbal cues required;minimum assist (75% patient effort)  -MD     Transfers, Stand-Sit [place]  verbal cues required;minimum assist (75% patient effort)  -MD     Toilet Transfer, [place]   minimum assist (75% patient effort);nonverbal cues required (demo/gesture);verbal cues required  -DN    Toilet Transfer, Assistive Device   elevated toilet seat  -DN    Transfer, Safety Issues  weight-shifting ability decreased;step length decreased;balance decreased during turns  -MD weight-shifting ability decreased;step length decreased;balance decreased during turns  -DN    Transfer, Impairments  strength decreased;sensation decreased;ROM decreased;impaired balance;coordination impaired;motor control impaired  -MD strength decreased;sensation decreased;ROM decreased;impaired balance;coordination impaired;motor control impaired  -DN    Recorded by  [MD] Lara White, PT  [JOSE ALBERTO] SABRINA Smith    Gait Assessment/Treatment    Gait, [place] Level  verbal cues required;contact guard assist;minimum assist (75% patient effort)  -MD     Gait, Assistive Device  other (see comments)   HHA  -MD     Gait, Distance (Feet)  80   x2  -MD     Gait, Gait Deviations  bilateral:;luke decreased;forward flexed posture;step length decreased;left:;antalgic;right:;decreased heel strike;weight-shifting ability decreased;narrow base;limb motion velocity decreased  -MD     Gait, Safety Issues  step length decreased;weight-shifting ability decreased;sequencing ability decreased  -MD     Gait, Impairments  strength decreased;impaired balance;coordination impaired;motor control impaired  -MD     Recorded by  [MD] Lara White, PT     Upper Body Bathing Assessment/Training    UB Bathing Assess/Train, Position   sitting;sink side  -DN    UB Bathing Assess/Train, [place] Level   moderate assist (50% patient effort);verbal cues required;nonverbal cues required (demo/gesture);set up required  -DN    UB Bathing Assess/Train, Impairments   strength decreased;impaired balance;coordination impaired;motor control impaired;impaired vision;sensation decreased  -DN    Recorded by   [JOSE ALBERTO] SABRINA Smith    Lower Body Bathing Assessment/Training    LB Bathing Assess/Train, Position   sitting;standing  -DN    LB Bathing Assess/Train, [place] Level   maximum assist (25% patient effort);verbal cues required;nonverbal cues required (demo/gesture);set up required  -DN    LB Bathing Assess/Train, Impairments   impaired balance;coordination impaired;motor control impaired;strength decreased;impaired vision;sensation decreased  -DN    Recorded by   [JOSE ALBERTO] SABRINA Smith    Upper Body Dressing Assessment/Training    UB Dressing Assess/Train, Comment   pt to wear gown only due to sx scheduled in pm  -DN    Recorded by   [JOSE ALBERTO] SABRINA Smith    Lower Body Dressing Assessment/Training    LB Dressing Assess/Train,  Comment   Gown only  -DN    Recorded by   [JOSE ALBERTO] SABRINA Smith    Toileting Assessment/Training    Toileting Assess/Train, Assistive Device   raised toilet seat;grab bars  -DN    Toileting Assess/Train, Position   sitting;standing  -DN    Toileting Assess/Train, Indepen Level   dependent (less than 25% patient effort);nonverbal cues required (demo/gesture);verbal cues required  -DN    Toileting Assess/Train, Impairments   motor control impaired;coordination impaired;impaired balance;strength decreased;sensation decreased;impaired vision  -DN    Recorded by   [JOSE ALBERTO] SABRINA Smith    Therapy Exercises    Bilateral Lower Extremities  AROM:;20 reps;supine;ankle pumps/circles;hip abduction/adduction;heel slides;quad sets;SAQ;SLR  -MD     Recorded by  [MD] Lara White, PT     Positioning and Restraints    Pre-Treatment Position  in bed  -MD --   w/c  -DN    Post Treatment Position  bed  -MD bed  -DN    In Bed  supine;call light within reach;with family/caregiver  -MD     Recorded by  [MD] Lara White, PT [DN] SABRINA Smith      User Key  (r) = Recorded By, (t) = Taken By, (c) = Cosigned By    Initials Name Effective Dates    SL Malia Turner MS CCC-SLP 04/13/15 -     SABRINA Nolasco 04/13/15 -     MD Lara White, PT 12/01/15 -               IP SLP Goals       01/26/18 1310 01/25/18 1220       Begin to Take Some PO Safely    Begin to Take Some PO Safely- SLP, Outcome  goal ongoing  -JJ     Expressive- Optimal Participation in Care    Expressive Optimal Participation in Care- SLP, Time to Achieve by discharge  -NR      Expressive Optimal Participation in Care- SLP, Activity Level Patient will improve word retrieval skills  -NR      Expressive Optimal Participation in Care- SLP, Outcome goal ongoing  -NR      Expressive Optimal Participation in Care- SLP, Reason Goal Not Met progress slower than expected  -NR        User Key  (r) = Recorded By, (t) = Taken By, (c) = Cosigned By    Initials Name Provider Type    JJ  Ksenia Lawton, MS CCC-SLP Speech and Language Pathologist    NR Julianne Edmond MA,CCC-SLP Speech and Language Pathologist          EDUCATION  The patient has been educated in the following areas:   Cognitive Impairment Communication Impairment.    SLP Recommendation and Plan                                          Time Calculation:         Time Calculation- SLP       02/01/18 1030 02/01/18 0859       Time Calculation- SLP    SLP Start Time 1000  -SL 0830  -SL     SLP Stop Time 1030  -SL 0900  -SL     SLP Time Calculation (min) 30 min  -SL 30 min  -SL       User Key  (r) = Recorded By, (t) = Taken By, (c) = Cosigned By    Initials Name Provider Type    WING Turner MS CCC-SLP Speech and Language Pathologist          Therapy Charges for Today     Code Description Service Date Service Provider Modifiers Qty    [id number]  ST TREATMENT SPEECH 2 1/31/2018 Malia Turner MS CCC-SLP GN 1    [id number]  ST TREATMENT SPEECH 4 2/1/2018 Malia Turner MS CCC-SLP GN 1          SLP G-Codes  Functional Limitations: Swallowing  Swallow Current Status (): At least 60 percent but less than 80 percent impaired, limited or restricted  Swallow Goal Status (): At least 40 percent but less than 60 percent impaired, limited or restricted  Swallow Discharge Status (): At least 60 percent but less than 80 percent impaired, limited or restricted    Malia Turner MS CCC-SLP  2/1/2018

## 2018-02-01 NOTE — THERAPY DISCHARGE NOTE
Inpatient Rehabilitation - Physical Therapy Treatment Note/Discharge  [facility]     Patient Name: Masha Porter  : 1960  MRN: [id number]  Today's Date: 2018  Onset of Illness/Injury or Date of Surgery Date: 17  Date of Referral to PT: 18  Referring Physician: Heidi    Admit Date: 1/3/2018    Visit Dx:    ICD-10-CM ICD-9-CM   1. Primary lung cancer, left C34.92 162.9   2. Oropharyngeal dysphagia R13.12 787.22     Patient Active Problem List   Diagnosis   • Expressive aphasia   • Dyslipidemia   • Cardioembolic stroke   • Diastolic CHF, chronic   • Aortic insufficiency   • Mitral regurgitation   • Atelectasis   • CVA (cerebral vascular accident)   • Lung cancer, lower lobe   • Bone metastasis   • Oropharyngeal dysphagia       Physical Therapy Education     Title: PT OT SLP Therapies (Active)     Topic: Physical Therapy (Resolved)     Point: Mobility training (Resolved)    Learning Progress Summary    Learner Readiness Method Response Comment Documented by Status   Patient Eager E,D,TB NR  MELODY 18 1229 Active    Acceptance E NR  MG 18 1155 Active    Acceptance E,D NR   18 1527 Active    Acceptance E,TB,D NR   18 1345 Active    Acceptance E,TB,D NR,VU Gait training & transfer training with increased attention to R. Balance activities. JS 18 1256 Done   Family Acceptance E,D DU Family teaching complete for stairs.  Spouse states he is comfortable with all other aspects of functional mobility, car transfer, transfers, bed mobility, and ambulation MD 18 1509 Done    Acceptance E,D VUCASSANDRA family teaching complete for curb, car transfer and walking MD 18 1446 Done    Acceptance E,TB,D VU,DU family teaching for bed-chair transfers complete. MD 18 0947 Done               Point: Home exercise program (Resolved)    Learning Progress Summary    Learner Readiness Method Response Comment Documented by Status   Patient Acceptance DEMAR LOPEZ MD 18  1508 Done    Acceptance E NR  MG 01/20/18 1155 Active    Acceptance E,D NR   01/11/18 1527 Active    Acceptance E,TB,D NR   01/09/18 1345 Active    Acceptance E,TB,D NR,VU Gait training & transfer training with increased attention to R. Balance activities.  01/06/18 1256 Done   Family Acceptance H,E JESSICA MCRAE 02/01/18 1508 Done               Point: Body mechanics (Resolved)    Learning Progress Summary    Learner Readiness Method Response Comment Documented by Status   Patient Acceptance E NR  MG 01/20/18 1155 Active    Acceptance E,TB,D NR   01/09/18 1345 Active    Acceptance E,TB,D NR,VU Gait training & transfer training with increased attention to R. Balance activities.  01/06/18 1256 Done               Point: Precautions (Resolved)    Learning Progress Summary    Learner Readiness Method Response Comment Documented by Status   Patient Acceptance E,D NR  MD 01/31/18 1525 Active    Acceptance E,D NR  MD 01/30/18 0823 Active    Acceptance E,D NR  MD 01/26/18 0946 Active    Acceptance E,D NR  MD 01/25/18 1252 Active    Acceptance E,D NR  MD 01/24/18 0826 Active    Acceptance E,D NR  MD 01/23/18 0857 Active    Acceptance E,D NR  MD 01/22/18 0840 Active    Acceptance E NR  MG 01/20/18 1155 Active    Acceptance E,D NR  MD 01/19/18 1427 Active    Acceptance E,D NR  MD 01/18/18 0947 Active    Acceptance E,D NR  MD 01/17/18 1313 Active    Acceptance E,D NR  MD 01/16/18 1134 Active    Acceptance E,D VU  MD 01/16/18 1023 Done    Acceptance E,D NR  MD 01/13/18 0926 Active    Acceptance E,D NR  MD 01/12/18 0930 Active    Acceptance E,TB,D NR   01/09/18 1345 Active    Acceptance E,D NR  MD 01/09/18 0945 Active    Acceptance E,D NR  MD 01/08/18 0927 Active    Acceptance E,TB,D NR,VU Gait training & transfer training with increased attention to R. Balance activities.  01/06/18 1256 Done    Acceptance E,D NR  MD 01/05/18 0959 Active    Acceptance E,D NR  MD 01/04/18 1435 Active   Family Acceptance E,D VU,DU Family  teaching completed for steps.  Spouse states he feels comfortable with transfers, car transfer, ambulation and bed mobility. MD 02/01/18 0953 Done                      User Key     Initials Effective Dates Name Provider Type Discipline    MELODY 04/24/15 -  Paty Nam, PT Physical Therapist PT    JS 04/06/17 -  Lyn Lynch, PT Physical Therapist PT    LH 02/07/17 -  Barbra Don, PT Physical Therapist PT    MD 12/01/15 -  Lara White, PT Physical Therapist PT    KP 12/01/15 -  Meredith Rosas, PT Physical Therapist PT    MG 09/13/17 -  Megan Gosselin, PT Physical Therapist PT                    IP PT Goals       02/01/18 1506 01/25/18 1411       Bed Mobility PT LTG    Bed Mobility PT LTG, [place] Level  contact guard assist  -MD     Bed Mobility PT LTG, Date Goal Reviewed 02/01/18  -MD 01/25/18  -MD     Bed Mobility PT LTG, Outcome goal not met  -MD goal revised  -MD     Bed Mobility PT LTG, Reason Goal Not Met  goals revised this date  -MD     Transfer Training PT LTG    Transfer Training PT LTG, Assist Device  other (see comments)   HHA  -MD     Transfer Training PT  LTG, Date Goal Reviewed 02/01/18  -MD 01/25/18  -MD     Transfer Training PT LTG, Outcome goal not met  -MD goal revised  -MD     Transfer Training PT LTG, Reason Goal Not Met  goals revised this date  -MD     Transfer Training 2 PT LTG    Transfer Training PT 2 LTG, Date Goal Reviewed 02/01/18  -MD 01/25/18  -MD     Transfer Training PT 2 LTG, Outcome goal met  -MD goal ongoing  -MD     Gait Training PT LTG    Gait Training Goal PT LTG, [place]  contact guard assist  -MD     Gait Training Goal PT LTG, Date Goal Reviewed 02/01/18  -MD 01/25/18  -MD     Gait Training Goal PT LTG, Outcome goal not met  -MD goal revised  -MD     Gait Training Goal PT LTG, Reason Goal Not Met  goals revised this date  -MD     Patient Education PT LTG    Patient Education PT LTG, Date Goal Reviewed 02/01/18  -MD 01/25/18  -MD     Patient Education PT LTG  Outcome goal met  -MD goal ongoing  -MD       User Key  (r) = Recorded By, (t) = Taken By, (c) = Cosigned By    Initials Name Provider Type    MD Lara White, PT Physical Therapist              Adult Rehabilitation Note       02/01/18 1030 02/01/18 0916 02/01/18 0841    Rehab Assessment/Intervention    Discipline speech language pathologist  -SL physical therapist  -MD speech language pathologist  -SL    Document Type therapy note (daily note)  -SL therapy note (daily note);discharge summary  -MD therapy note (daily note)  -SL    Subjective Information no complaints;agree to therapy  -SL no complaints;agree to therapy  -MD no complaints;agree to therapy  -SL    Patient Effort, Rehab Treatment good  -SL good  -MD adequate  -SL    Symptoms Noted During/After Treatment none  -SL none  -MD     Precautions/Limitations NPO;fall precautions  -SL fall precautions  -MD fall precautions;NPO  -SL    Specific Treatment Considerations   nasal O2 in place  -SL    Patient Response to Treatment performed much better today in tx- alert, awake for both sessions- not as fatiqued  -SL      Equipment Issued to Patient  gait belt  -MD     Recorded by [SL] Malia Turner, MS CCC-SLP [MD] Lara White, PT [SL] Malia Turner, MS CCC-SLP    Pain Assessment    Pain Assessment  No/denies pain  -MD     Recorded by  [MD] Lara White PT     Vision Assessment/Intervention    Visual Impairment  visual impairment, right  -MD     Visual Field  right visual field impairment  -MD     Recorded by  [MD] Lara White PT     Cognitive Assessment/Intervention    Current Cognitive/Communication Assessment impaired  -SL impaired  -MD impaired  -SL    Orientation Status oriented to;person;place  -SL oriented to;person  -MD oriented to;person  -SL    Follows Commands/Answers Questions needs increased time;needs cueing  -SL 75% of the time;needs cueing;needs increased time;needs repetition  -MD needs cueing;needs increased time  -SL    Personal Safety  decreased insight to  deficits  -MD     Personal Safety Interventions  fall prevention program maintained;gait belt;muscle strengthening facilitated;nonskid shoes/slippers when out of bed;supervised activity  -MD     Recorded by [SL] Malia Turner MS CCC-SLP [MD] Lara White, PT [SL] Malia Turner MS CCC-SLP    Improve ability to comprehend words/phrases/sentences    Improve ability to comprehend words/phrases/sentences through: identify pictures, field of  -SL      Ability to Comprehend Words/Phrases/Sentences Progress 60%;70%;with inconsistent cues  -SL      Recorded by [SL] Malia Turner MS CCC-SLP      Improve ability to comprehend questions    Improve ability to comprehend questions:   simple yes/no questions  -SL    Ability to Comprehend Questions Progress   60%;with inconsistent cues  -SL    Recorded by   [SL] Malia Turner MS CCC-SLP    Improve word retrieval skills    Word Retrieval Skills Progress   10%;without cues;60%;with consistent cues;following model   naming items; phonemic cues/imitative production beneficial  -SL    Comments: Improve word retrieval skills 100% for aut  tasks with min cues from slo ( needed for initiation and intermittently during longer production of months)- improved intelligibility of stimulus  -SL  80% for sent completions with one word with added phonemic cues  -SL    Recorded by [SL] Malia Turner MS CCC-SLP  [SL] Malia Turner MS CCC-SLP    Improve motor planning    Improve motor planning to reduce apraxia by: imitating mouth postures;imitating vowels  -SL      Motor Planning Progress 80%;with inconsistent cues  -SL      Recorded by [SL] Malia Turner MS CCC-SLP      Improve planning and execution of connected speech    Planning and Execution of Connected Speech Progress 50%;with inconsistent cues   response to ?- FELIX, level 1  -SL  40%;with consistent cues   songs/rhymes w/cues form slp  -SL    Comments: Improve planning and execution of connected speech 100% for production of words/phrases in unison with slp using  melodic intonation; imporved clarity of productions; noted improved spont production of stimulus after slp asked question-  -SL      Recorded by [SL] Malia Turner MS CCC-SLP  [SL] Malia Turner MS CCC-SLP    Bed Mobility, Assessment/Treatment    Bed Mobility, Roll Left, [place]  verbal cues required;contact guard assist  -MD     Bed Mobility, Roll Right, [place]  verbal cues required;minimum assist (75% patient effort)  -MD     Bed Mobility, Scoot/Bridge, [place]  supervision required;verbal cues required  -MD     Bed Mob, Supine to Sit, [place]  verbal cues required;nonverbal cues required (demo/gesture);minimum assist (75% patient effort)  -MD     Bed Mob, Sit to Supine, [place]  verbal cues required;minimum assist (75% patient effort)  -MD     Bed Mobility, Safety Issues  cognitive deficits limit understanding;decreased use of arms for pushing/pulling;decreased use of legs for bridging/pushing  -MD     Bed Mobility, Impairments  strength decreased;impaired balance;coordination impaired;motor control impaired  -MD     Bed Mobility, Comment  Pt up in chair when PT arrived this am.  Pt spouse assisted pt out of bed and to chair.  -MD     Recorded by  [MD] Lara White, PT     Transfer Assessment/Treatment    Transfers, Sit-Stand [place]  verbal cues required;contact guard assist;minimum assist (75% patient effort)  -MD     Transfers, Stand-Sit [place]  verbal cues required;contact guard assist;minimum assist (75% patient effort)  -MD     Transfers, Sit-Stand-Sit, Assist Device  other (see comments)   HHA  -MD     Transfer, Safety Issues  weight-shifting ability decreased;step length decreased;sequencing ability decreased;balance decreased during turns  -MD     Transfer, Impairments  strength decreased;impaired balance;coordination impaired;motor control impaired;pain  -MD     Transfer, Comment  car transfer: CGA  -MD     Recorded by  [MD] Lara White, PT     Gait Assessment/Treatment     Gait, [place] Level  verbal cues required;minimum assist (75% patient effort)  -MD     Gait, Assistive Device  other (see comments)   HHA  -MD     Gait, Distance (Feet)  80  -MD     Gait, Gait Deviations  bilateral:;luke decreased;forward flexed posture;left:;antalgic;right:;decreased heel strike;toe-to-floor clearance decreased  -MD     Gait, Safety Issues  step length decreased  -MD     Gait, Impairments  strength decreased;impaired balance;coordination impaired;motor control impaired  -MD     Recorded by  [MD] Lara White PT     Stairs Assessment/Treatment    Number of Stairs  4  -MD     Stairs, Handrail Location  none  -MD     Stairs, [place] Level  minimum assist (75% patient effort);contact guard assist;2 person assist required  -MD     Stairs, Technique Used  step to step (ascending);step to step (descending)  -MD     Stairs, Safety Issues  sequencing ability decreased  -MD     Stairs, Impairments  strength decreased;impaired balance;coordination impaired  -MD     Stairs, Comment  Practiced steps with spouse providing HHA, Min A and PT providing CGA due to first attempt and family teaching.  In PM spouse completed steps w/o CGA from PT.  Pt went up and down curb step w Min A  -MD     Recorded by  [MD] Lara White PT     Balance Skills Training    Gait Balance-Level of Assistance  Minimum assistance  -MD     Gait Balance Support  Left upper extremity supported  -MD     Gait Balance Activities  uneven surface  -MD     Gait Balance # of Minutes  --   20`  -MD     Recorded by  [MD] Lara White PT     Positioning and Restraints    Pre-Treatment Position  sitting in chair/recliner  -MD     Post Treatment Position  other  -MD     Other Position  return to room with caregiver  -MD     Recorded by  [MD] Lara White, PT       01/31/18 1543 01/31/18 1449 01/31/18 1203    Rehab Assessment/Intervention    Discipline occupational therapist  -JOSE ALBERTO physical therapist  -MD occupational therapist  -JOSE ALBERTO    Document Type  therapy note (daily note)  -DN therapy note (daily note)  -MD therapy note (daily note)  -DN    Subjective Information agree to therapy;complains of;fatigue;pain  -DN agree to therapy;complains of;fatigue  -MD agree to therapy;complains of;fatigue  -DN    Patient Effort, Rehab Treatment  good  -MD good  -DN    Symptoms Noted During/After Treatment fatigue;shortness of breath;increased pain  -DN fatigue;shortness of breath  -MD     Symptoms Noted Comment  In am pt experienced SOA after walking 40ft.  O2 was 83% following a rest break of 2-3 min O2 was 88% RN was notified of these events.  -MD pt C/O fatigue, declined shower, oppeded to complete rue ROM activities instead  -DN    Precautions/Limitations fall precautions  -DN fall precautions  -MD     Equipment Issued to Patient  gait belt  -MD     Recorded by [DN] Justo Henry OTR [MD] Lara White, PT [DN] SABRINA Smith    Pain Assessment    Pain Assessment Coelho-Lauren FACES  -DN No/denies pain  -MD No/denies pain  -DN    Coelho-Lauren FACES Pain Rating 4  -DN      Pain Score 4  -DN      Post Pain Score 4  -DN      Pain Type Surgical pain  -DN      Pain Location --   G tube sight  -DN      Pain Intervention(s) Repositioned  -DN      Recorded by [DN] SABRINA Smith [MD] Lara White, PT [DN] SABRINA Smith    Cognitive Assessment/Intervention    Current Cognitive/Communication Assessment impaired  -DN impaired  -MD     Orientation Status oriented to;person  -DN oriented to;person  -MD     Follows Commands/Answers Questions 50% of the time;able to follow single-step instructions;needs cueing;needs increased time  -DN 75% of the time;needs cueing;needs repetition  -MD     Personal Safety at risk behaviors demonstrated;decreased awareness, need for safety;decreased awareness, need for assist;decreased insight to deficits;unaware of functional deficits;unaware of consequences of deficits;unaware of cognitive deficits  -DN decreased insight to deficits  -MD      Personal Safety Interventions gait belt;fall prevention program maintained  -DN fall prevention program maintained;gait belt;muscle strengthening facilitated;nonskid shoes/slippers when out of bed;supervised activity  -MD     Recorded by [JOSE ALBERTO] SABRINA Smith [MD] Lara White, PT     Bed Mobility, Assessment/Treatment    Bed Mob, Supine to Sit, [place]  not tested  -MD     Bed Mob, Sit to Supine, [place]  not tested  -MD     Recorded by  [MD] Lara White PT     Transfer Assessment/Treatment    Transfers, Chair-Bed [place] moderate assist (50% patient effort);verbal cues required;set up required  -DN      Transfers, Sit-Stand [place]  verbal cues required;contact guard assist;minimum assist (75% patient effort)  -MD     Transfers, Stand-Sit [place]  verbal cues required;contact guard assist;minimum assist (75% patient effort)  -MD     Toilet Transfer, [place] minimum assist (75% patient effort);verbal cues required;nonverbal cues required (demo/gesture)  -DN      Toilet Transfer, Assistive Device wheelchair;bedside commode without drop arms  -DN      Transfer, Safety Issues weight-shifting ability decreased;step length decreased;sequencing ability decreased;balance decreased during turns  -DN weight-shifting ability decreased;step length decreased;sequencing ability decreased;balance decreased during turns  -MD     Transfer, Impairments strength decreased;impaired balance;coordination impaired;motor control impaired;pain  -DN strength decreased;impaired balance;coordination impaired;motor control impaired;muscle tone abnormal;impaired vision  -MD     Transfer, Comment  car transfer: Min A  -MD     Recorded by [DN] SABRINA Smith [MD] Lara White, PT     Gait Assessment/Treatment    Gait, [place] Level  verbal cues required;minimum assist (75% patient effort)  -MD     Gait, Distance (Feet)  80   x1 and 40` x1  -MD     Gait, Gait Deviations  bilateral:;luke decreased;forward  flexed posture;step length decreased;left:;antalgic;right:;decreased heel strike;toe-to-floor clearance decreased  -MD     Gait, Safety Issues  step length decreased;weight-shifting ability decreased  -MD     Gait, Impairments  strength decreased;impaired balance;coordination impaired;motor control impaired  -MD     Recorded by  [MD] Lara White PT     Upper Body Bathing Assessment/Training    UB Bathing Assess/Train, Position sitting;sink side  -DN      UB Bathing Assess/Train, [place] Level hand over hand;maximum assist (25% patient effort);verbal cues required;nonverbal cues required (demo/gesture);set up required  -DN      UB Bathing Assess/Train, Impairments strength decreased;coordination impaired;motor control impaired;impaired vision  -DN      Recorded by [JOSE ALBERTO] SABRINA Smith      Lower Body Bathing Assessment/Training    LB Bathing Assess/Train, Position sitting;standing  -DN      LB Bathing Assess/Train, [place] Level dependent (less than 25% patient effort);verbal cues required;nonverbal cues required (demo/gesture);set up required  -DN      LB Bathing Assess/Train, Impairments impaired balance;strength decreased;motor control impaired;impaired vision  -DN      Recorded by [JOSE ALBERTO] SABRINA Smtih      Upper Body Dressing Assessment/Training    UB Dressing Assess/Train, Clothing Type donning:;doffing:;pull over;t-shirt  -DN      UB Dressing Assess/Train, Assist Device pascual technique  -DN      UB Dressing Assess/Train, Position sitting  -DN      UB Dressing Assess/Train, [place] maximum assist (25% patient effort);verbal cues required;nonverbal cues required (demo/gesture)  -DN      UB Dressing Assess/Train, Impairments strength decreased;coordination impaired;motor control impaired  -DN      Recorded by [JOSE ALBERTO] SABRINA Smith      Lower Body Dressing Assessment/Training    LB Dressing Assess/Train, Clothing Type doffing:;donning:;pants;shoes;socks  -DN      LB Dressing Assess/Train,  Position sitting;standing  -DN      LB Dressing Assess/Train, [place] dependent (less than 25% patient effort);2 person assist required  -DN      LB Dressing Assess/Train, Impairments motor control impaired;coordination impaired;impaired balance;strength decreased  -DN      Recorded by [JOSE ALBERTO] SABRINA Smith      Toileting Assessment/Training    Toileting Assess/Train, Assistive Device grab bars;bedside commode  -DN      Toileting Assess/Train, Position sitting;standing  -DN      Toileting Assess/Train, [place] Level dependent (less than 25% patient effort);2 person assist required;verbal cues required;nonverbal cues required (demo/gesture)  -DN      Toileting Assess/Train, Impairments strength decreased;impaired balance;coordination impaired;motor control impaired  -DN      Recorded by [JOSE ALBERTO] SABRINA Smith      Therapy Exercises    Bilateral Lower Extremities  AROM:;20 reps;supine;ankle pumps/circles;hip abduction/adduction;heel slides;quad sets;SAQ;SLR  -MD     Right Upper Extremity   PROM:;AAROM:;15 reps;supine;elbow flexion/extension;pronation/supination;shoulder abduction/adduction;shoulder circles;shoulder extension/flexion;shoulder ER/IR;shoulder horizontal abd/add;shoulder protraction/retraction  -DN    Recorded by  [MD] Lara White, PT [DN] SABRINA Smith    Neuromuscular Re-education    Neuromuscular Re-Ed Techniques   Weight bearing RUE;Facilitation isolated movement ([place]);Gross motor task- grasp/release  -DN    Facilitation Isolated Movement   right:;scapula;shoulder;forearm;elbow;wrist;finger;thumb  -DN    Detail (Neuromuscular Re-Education)   completed AAROM/PROM, followed by active movement of RUE to grab cups and place with min a:  Pt now jose alejandro to actively move RUE  in all planes at varous degrees  -DN    Recorded by   [JOSE ALBERTO] SABRINA Smith    Positioning and Restraints    Pre-Treatment Position in bed  -DN in bed  -MD in bed  -DN    Post Treatment Position bed  -DN bed  -MD bed  -DN     In Bed supine;with family/caregiver  -DN supine;call light within reach;with family/caregiver  -MD supine;call light within reach;encouraged to call for assist;with family/caregiver  -DN    Recorded by [JOSE ALBERTO] SABRINA Smith [MD] Lara White, PT [DN] SABRINA Smith      01/31/18 0900 01/30/18 1540 01/30/18 1221    Rehab Assessment/Intervention    Discipline speech language pathologist  -SL occupational therapist  -DN occupational therapist  -DN    Document Type therapy note (daily note)  -SL therapy note (daily note)  -DN therapy note (daily note)  -DN    Subjective Information agree to therapy  -SL no complaints;agree to therapy  -DN agree to therapy;no complaints  -DN    Patient Effort, Rehab Treatment fair  -SL good  -DN good  -DN    Symptoms Noted During/After Treatment fatigue  -SL fatigue  -DN fatigue  -DN    Precautions/Limitations NPO;fall precautions  -SL fall precautions;NPO;swallowing precautions  -DN fall precautions;NPO;swallowing precautions  -DN    Precautions/Limitations, Vision  vision impairment, right  -DN vision impairment, right  -DN    Patient Response to Treatment pt very fatiqued during session, closing eyes- needed cues to stay awake and participate;   -SL      Recorded by [SL] Malia Turner MS CCC-SLP [DN] SABRINA Smith [DN] SABRINA Smith    Pain Assessment    Pain Assessment  Coelho-Lauren FACES  -DN Coelho-Baker FACES  -DN    Coelho-Lauren FACES Pain Rating  2  -DN 2  -DN    Pain Score  2  -DN 2  -DN    Pain Type  Surgical pain  -DN Surgical pain  -DN    Pain Location  --   G-Tube placement sight  -DN --   g tube sight  -DN    Pain Intervention(s)  Repositioned  -DN     Recorded by  [DN] SABRINA Smith [DN] SABRINA Smith    Vision Assessment/Intervention    Visual Impairment  visual impairment, right  -DN visual impairment, right;inattention to the right;right neglect  -DN    Recorded by  [JOSE ALBERTO] SABRINA Smith [DN] SABRINA Smith    Cognitive Assessment/Intervention     Current Cognitive/Communication Assessment impaired  -SL impaired  -DN impaired  -DN    Orientation Status oriented to;person  -SL person  -DN oriented to;person  -DN    Follows Commands/Answers Questions needs increased time;needs cueing   did not follow any 1 step verbal command appropriately  -SL 25% of the time  -DN 25% of the time;able to follow single-step instructions;needs cueing;needs repetition;needs increased time  -DN    Personal Safety  decreased awareness, need for assist;decreased awareness, need for safety;decreased insight to deficits;fully aware of deficits;unaware of cognitive deficits;unaware of consequences of deficits;unaware of functional deficits;one on one supervision required for safety  -DN decreased awareness, need for assist;decreased awareness, need for safety;decreased insight to deficits;at risk behaviors demonstrated;one on one supervision required for safety  -DN    Personal Safety Interventions  gait belt;fall prevention program maintained;supervised activity  -DN gait belt;muscle strengthening facilitated;supervised activity  -DN    Recorded by [SL] Malia Turner MS CCC-SLP [DN] SABRINA Smith [DN] SABRINA Smith    Improve ability to comprehend words/phrases/sentences    Improve ability to comprehend words/phrases/sentences through: identify pictures, field of  -SL      Ability to Comprehend Words/Phrases/Sentences Progress 40%;without cues  -SL      Comments: Improve ability to comprehend words/phrases/sentences piture id given name and descriptive cues  -SL      Recorded by [SL] Malia Turner MS CCC-SLP      Improve ability to follow directions    Ability to Follow Directions Progress 0%;without cues;50%;with consistent cues;following model   visual model and tactile cues needed  -SL      Comments: Improve ability to follow directions pt very perseverative; poor attn; drowsy  -SL      Recorded by [SL] Malia Turner MS CCC-SLP      Improve word retrieval skills    Comments:  Improve word retrieval skills 80% for counting, days, and months with cues for initiation and intermittently during sequence  -SL      Recorded by [SL] Malia Turner MS CCC-SLP      Improve motor planning    Improve motor planning to reduce apraxia by: imitating vowels;imitating mouth postures  -SL      Motor Planning Progress 60%;70%;with inconsistent cues   random vowel imitation  -SL      Comments: Improve motor planning to Comments: Reduce apraxia unable to protrude tongue, lateralize or elevate; could pucker and smile- r side facial droop remains  -SL      Recorded by [SL] Malia Turner MS CCC-SLP      Transfer Assessment/Treatment    Transfers, Chair-Bed [place]   minimum assist (75% patient effort);verbal cues required  -DN    Toilet Transfer, [place]   minimum assist (75% patient effort);verbal cues required  -DN    Toilet Transfer, Assistive Device   wheelchair;bedside commode without drop arms  -DN    Transfer, Safety Issues   weight-shifting ability decreased;step length decreased;sequencing ability decreased;balance decreased during turns  -DN    Transfer, Impairments   strength decreased;impaired balance;coordination impaired;motor control impaired;muscle tone abnormal;impaired vision  -DN    Recorded by   [DN] SABRINA Smith    Upper Body Bathing Assessment/Training    UB Bathing Assess/Train, Position   sitting;sink side  -DN    UB Bathing Assess/Train, [place] Level   maximum assist (25% patient effort);set up required;supervision required;verbal cues required  -DN    UB Bathing Assess/Train, Impairments   strength decreased;sensation decreased;impaired balance;coordination impaired;motor control impaired;impaired vision  -DN    Recorded by   [JOSE ALBERTO] SABRINA Smith    Lower Body Bathing Assessment/Training    LB Bathing Assess/Train, Comment   already bathed after commode  -DN    Recorded by   [DN] SABRINA Smith    Upper Body Dressing Assessment/Training    UB Dressing  Assess/Train, Clothing Type   doffing:;donning:;pull over;t-shirt  -DN    UB Dressing Assess/Train, Assist Device   pascual technique  -DN    UB Dressing Assess/Train, Position   sitting  -DN    UB Dressing Assess/Train, [place]   maximum assist (25% patient effort);verbal cues required;nonverbal cues required (demo/gesture);set up required  -DN    UB Dressing Assess/Train, Impairments   strength decreased;impaired balance;coordination impaired;motor control impaired;impaired vision  -DN    Recorded by   [JOSE ALBERTO] SABRINA Smith    Lower Body Dressing Assessment/Training    LB Dressing Assess/Train, Comment   already completed in am  -DN    Recorded by   [JOSE ALBERTO] SABRINA Smith    Toileting Assessment/Training    Toileting Assess/Train, Assistive Device   bedside commode  -DN    Toileting Assess/Train, Position   sitting;standing  -DN    Toileting Assess/Train, Indepen Level   dependent (less than 25% patient effort);verbal cues required;nonverbal cues required (demo/gesture)  -DN    Toileting Assess/Train, Impairments   motor control impaired;coordination impaired;impaired balance;strength decreased;sensation decreased  -DN    Recorded by   [DN] SABRINA Smith    Therapy Exercises    Right Upper Extremity  AROM:;10 reps;supine;elbow flexion/extension;pronation/supination;shoulder abduction/adduction;shoulder extension/flexion;shoulder circles;shoulder ER/IR;shoulder horizontal abd/add;shoulder protraction/retraction  -DN     Recorded by  [DN] SABRINA Smith     Neuromuscular Re-education    Neuromuscular Re-Ed Techniques  Weight bearing RUE;Facilitation isolated movement ([place]);Functional Movement Patterns;Gross motor task- grasp/release  -DN     Facilitation Isolated Movement  right:;scapula;shoulder;elbow;forearm;wrist;finger;thumb  -DN     Detail (Neuromuscular Re-Education)  pt presents with shoulder, elbow, forearm, wrist and finger movements WFL with delay and motor planning issues, and R thumb is  trace movememnt  -DN     Recorded by  [DN] SABRINA Smith     Positioning and Restraints    Pre-Treatment Position  in bed  -DN --   sitting in w/c  -DN    Post Treatment Position  bed  -DN bed  -DN    In Bed  encouraged to call for assist;call light within reach;with family/caregiver  -DN supine;exit alarm on;with family/caregiver;encouraged to call for assist;call light within reach  -DN    Recorded by  [DN] SABRINA Smith [DN] SABRINA Smith      01/30/18 1025 01/30/18 0820 01/29/18 1535    Rehab Assessment/Intervention    Discipline speech language pathologist  -SL physical therapist  -MD occupational therapist  -DN    Document Type therapy note (daily note)  -WING therapy note (daily note)  -MD therapy note (daily note)  -DN    Subjective Information no complaints  -SL agree to therapy;complains of;fatigue  -MD no complaints;agree to therapy  -DN    Patient Effort, Rehab Treatment good  -SL good  -MD good  -DN    Treatment Not Performed   patient unavailable for treatment  -DN    Treatment Not Performed, Comment   pt missed PM tx due to out for SX for G-tube placement  -DN    Symptoms Noted During/After Treatment fatigue  -SL none  -MD     Precautions/Limitations NPO;fall precautions  -SL fall precautions  -MD NPO;fall precautions;swallowing precautions  -DN    Precautions/Limitations, Vision   vision impairment, right  -DN    Equipment Issued to Patient  gait belt  -MD     Recorded by [SL] Malia Turner MS CCC-SLP [MD] Lara White, PT [DN] SABRINA Smith    Pain Assessment    Pain Assessment  No/denies pain  -MD     Recorded by  [MD] Lara White, PT     Vision Assessment/Intervention    Visual Impairment  visual impairment, right;inattention to the right;needs cues to attend visually;decreased visual tracking;right neglect  -MD     Visual Field  right visual field impairment  -MD     Recorded by  [MD] Lara White, PT     Cognitive Assessment/Intervention    Current Cognitive/Communication Assessment  impaired  -SL impaired  -MD impaired  -DN    Orientation Status oriented to;person  -SL oriented to;person  -MD     Follows Commands/Answers Questions 25% of the time;50% of the time;able to follow single-step instructions;needs increased time;needs cueing   needs visual cues/tactile cues  -SL 50% of the time;needs cueing;needs increased time;needs repetition  -MD able to follow single-step instructions;25% of the time;needs repetition;needs increased time;needs cueing  -DN    Personal Safety  decreased awareness, need for assist;decreased awareness, need for safety;decreased insight to deficits  -MD mild impairment;at risk behaviors demonstrated;decreased awareness, need for safety;decreased awareness, need for assist;decreased insight to deficits;unaware of cognitive deficits;unaware of consequences of deficits;unaware of functional deficits  -DN    Personal Safety Interventions  gait belt;muscle strengthening facilitated;nonskid shoes/slippers when out of bed;supervised activity;fall prevention program maintained  -MD fall prevention program maintained;gait belt;supervised activity  -DN    Recorded by [SL] Malia Turner MS CCC-SLP [MD] Lara White, PT [DN] Justo Henry, OTR    Improve ability to comprehend words/phrases/sentences    Ability to Comprehend Words/Phrases/Sentences Progress 50%;without cues   picture (w/word written underneath stim) ID - [id number]  -SL      Recorded by [SL] Malia Turner MS CCC-SLP      Improve ability to follow directions    Ability to Follow Directions Progress 20%;without cues;50%;with consistent cues;following model  -SL      Recorded by [SL] Malia Turner MS CCC-SLP      Improve ability to comprehend questions    Improve ability to comprehend questions: simple yes/no questions  -SL      Ability to Comprehend Questions Progress 40%;50%;without cues  -SL      Recorded by [SL] Malia Turner MS CCC-SLP      Improve word retrieval skills    Word Retrieval Skills Progress 0%;without cues;50%;with  consistent cues;following model   naming  -SL      Comments: Improve word retrieval skills 40% - phrase completions w/1 word  -SL      Recorded by [SL] Malia Turner MS CCC-SLP      Improve ability to construct phrase and sentence level responses    Comments: Improve ability to construct phrase and sentence level responses 30- 50% for songs, rhymes; counting, days, months, and abc..- 80%  -SL      Recorded by [SL] Malia Turner MS CCC-SLP      Improve motor planning    Improve motor planning to reduce apraxia by: imitating vowels;following isolated oral commands  -      Motor Planning Progress 80%;90%;with inconsistent cues;following model   vowels  -SL      Comments: Improve motor planning to Comments: Reduce apraxia simple cvc combinations w/max cues-20%  -SL      Recorded by [SL] Malia Turner MS CCC-SLP      Improve planning and execution of connected speech    Comments: Improve planning and execution of connected speech still has difficulty w/oral motor mvmt imitation, but could pucker, smile, and lateralize tongu w/visual cues  -SL      Recorded by [SL] Malia Turner MS CCC-SLP      Bed Mobility, Assessment/Treatment    Bed Mob, Supine to Sit, [place]  verbal cues required;minimum assist (75% patient effort);moderate assist (50% patient effort);2 person assist required  -MD     Bed Mob, Sit to Supine, [place]  not tested  -MD     Bed Mobility, Safety Issues  cognitive deficits limit understanding;decreased use of arms for pushing/pulling;decreased use of legs for bridging/pushing  -MD     Bed Mobility, Impairments  strength decreased;impaired balance;coordination impaired;motor control impaired  -MD     Recorded by  [MD] Lara White PT     Transfer Assessment/Treatment    Transfers, Bed-Chair [place]  verbal cues required;contact guard assist  -MD     Transfers, Chair-Bed [place]  not tested  -MD minimum assist (75% patient effort);verbal cues required;nonverbal cues required (demo/gesture)  -JOSE ALBERTO     Transfers, Bed-Chair-Bed, Assist Device  bed rails  -MD     Transfers, Sit-Stand [place]  verbal cues required;minimum assist (75% patient effort)  -MD     Transfers, Stand-Sit [place]  verbal cues required;minimum assist (75% patient effort)  -MD     Toilet Transfer, [place]   minimum assist (75% patient effort);nonverbal cues required (demo/gesture);verbal cues required  -DN    Toilet Transfer, Assistive Device   elevated toilet seat  -DN    Transfer, Safety Issues  weight-shifting ability decreased;step length decreased;balance decreased during turns  -MD weight-shifting ability decreased;step length decreased;balance decreased during turns  -DN    Transfer, Impairments  strength decreased;sensation decreased;ROM decreased;impaired balance;coordination impaired;motor control impaired  -MD strength decreased;sensation decreased;ROM decreased;impaired balance;coordination impaired;motor control impaired  -DN    Recorded by  [MD] Lara White, PT [DN] SABRINA Smith    Gait Assessment/Treatment    Gait, [place] Level  verbal cues required;contact guard assist;minimum assist (75% patient effort)  -MD     Gait, Assistive Device  other (see comments)   HHA  -MD     Gait, Distance (Feet)  80   x2  -MD     Gait, Gait Deviations  bilateral:;luke decreased;forward flexed posture;step length decreased;left:;antalgic;right:;decreased heel strike;weight-shifting ability decreased;narrow base;limb motion velocity decreased  -MD     Gait, Safety Issues  step length decreased;weight-shifting ability decreased;sequencing ability decreased  -MD     Gait, Impairments  strength decreased;impaired balance;coordination impaired;motor control impaired  -MD     Recorded by  [MD] Lara White, PT     Upper Body Bathing Assessment/Training    UB Bathing Assess/Train, Position   sitting;sink side  -DN    UB Bathing Assess/Train, [place] Level   moderate assist (50% patient effort);verbal cues required;nonverbal  cues required (demo/gesture);set up required  -DN    UB Bathing Assess/Train, Impairments   strength decreased;impaired balance;coordination impaired;motor control impaired;impaired vision;sensation decreased  -DN    Recorded by   [JOSE ALBERTO] SABRINA Smith    Lower Body Bathing Assessment/Training    LB Bathing Assess/Train, Position   sitting;standing  -DN    LB Bathing Assess/Train, [place] Level   maximum assist (25% patient effort);verbal cues required;nonverbal cues required (demo/gesture);set up required  -DN    LB Bathing Assess/Train, Impairments   impaired balance;coordination impaired;motor control impaired;strength decreased;impaired vision;sensation decreased  -DN    Recorded by   [JOSE ALBERTO] SABRINA Smith    Upper Body Dressing Assessment/Training    UB Dressing Assess/Train, Comment   pt to wear gown only due to sx scheduled in pm  -DN    Recorded by   [JOSE ALBERTO] SABRINA Smith    Lower Body Dressing Assessment/Training    LB Dressing Assess/Train, Comment   Gown only  -DN    Recorded by   [JOSE ALBERTO] SABRINA Smith    Toileting Assessment/Training    Toileting Assess/Train, Assistive Device   raised toilet seat;grab bars  -DN    Toileting Assess/Train, Position   sitting;standing  -DN    Toileting Assess/Train, Indep[place] Level   dependent (less than 25% patient effort);nonverbal cues required (demo/gesture);verbal cues required  -DN    Toileting Assess/Train, Impairments   motor control impaired;coordination impaired;impaired balance;strength decreased;sensation decreased;impaired vision  -DN    Recorded by   [JOSE ALBERTO] SABRINA Smith    Therapy Exercises    Bilateral Lower Extremities  AROM:;20 reps;supine;ankle pumps/circles;hip abduction/adduction;heel slides;quad sets;SAQ;SLR  -MD     Recorded by  [MD] Lara White, PT     Positioning and Restraints    Pre-Treatment Position  in bed  -MD --   w/c  -DN    Post Treatment Position  bed  -MD bed  -DN    In Bed  supine;call light within reach;with  family/caregiver  -MD     Recorded by  [MD] Lara White, PT [DN] SABRINA Smith      User Key  (r) = Recorded By, (t) = Taken By, (c) = Cosigned By    Initials Name Effective Dates    WING Malia Turner, MS CCC-SLP 04/13/15 -     DN Justo Henry, OTR 04/13/15 -     MD Lara White, PT 12/01/15 -           PT Recommendation and Plan  Anticipated Equipment Needs At Discharge: gait belt  Anticipated Discharge Disposition: home with home health  Planned Therapy Interventions: balance training, bed mobility training, gait training, home exercise program, patient/family education, strengthening, stair training, transfer training  PT Frequency: 2 times/day  Plan of Care Review  Plan Of Care Reviewed With: patient  Progress: improving  Outcome Summary/Follow up Plan: Pt will require light weight WC for safe and independent house hold mobility.  Pt is currently unable to use a RWx due to R UE weakness and is unable to use a cane due to impaired congitive processing with regards to sequencing with a cane.         Time Calculation:         PT Charges       02/01/18 1501 02/01/18 0916       Time Calculation    Start Time 1430  -MD 0800  -MD     Stop Time 1500  -MD 0830  -MD     Time Calculation (min) 30 min  -MD 30 min  -MD     PT Received On  02/01/18  -MD       User Key  (r) = Recorded By, (t) = Taken By, (c) = Cosigned By    Initials Name Provider Type    MD Lara White, PT Physical Therapist          Therapy Charges for Today     Code Description Service Date Service Provider Modifiers Qty    18205239392 HC PT THER PROC EA 15 MIN 1/31/2018 Lara White, PT GP 4    91956720192 HC PT THER PROC EA 15 MIN 1/31/2018 Lara White, PT GP 4    55446471057 HC PT THER PROC EA 15 MIN 2/1/2018 Lara White, PT GP 4               PT Discharge Summary  Anticipated Discharge Disposition: home with home health  Reason for Discharge: Discharge from facility  Outcomes Achieved: Refer to plan of care for updates on goals achieved  Discharge Destination: Home  with assist, Home with home health    Lara White, PT  2/1/2018

## 2018-02-01 NOTE — PLAN OF CARE
Problem: Patient Care Overview (Adult)  Goal: Plan of Care Review  Outcome: Ongoing (interventions implemented as appropriate)   02/01/18 0047   Outcome Evaluation   Outcome Summary/Follow up Plan Cooperative. PRN pain medication given for left leg pain with positive result. Tolerated bolus feeding and water flush well. Call light placed in reach.   Patient Care Overview   Progress improving   Coping/Psychosocial Response Interventions   Plan Of Care Reviewed With patient       Problem: Stroke (Ischemic) (Adult)  Goal: Signs and Symptoms of Listed Potential Problems Will be Absent or Manageable (Stroke)  Outcome: Ongoing (interventions implemented as appropriate)      Problem: Fall Risk (Adult)  Goal: Absence of Falls  Outcome: Ongoing (interventions implemented as appropriate)      Problem: Pressure Ulcer Risk (Jon Scale) (Adult,Obstetrics,Pediatric)  Goal: Skin Integrity  Outcome: Ongoing (interventions implemented as appropriate)

## 2018-02-01 NOTE — PLAN OF CARE
Problem: Inpatient Occupational Therapy  Goal: Transfer Training Goal 1 STG- OT  Outcome: Unable to achieve outcome(s) by discharge Date Met: 02/01/18 02/01/18 1639   Transfer Training OT STG   Transfer Training OT STG, Date Established 02/01/18   Transfer Training OT STG, [place] Level minimum assist (75% patient effort)   Transfer Training OT STG, Assist Device commode, bedside;tub bench   Transfer Training OT STG, Outcome goal not met   Transfer Training OT STG, Reason Goal Not Met unable to make needed progress     Goal: Transfer Training Goal 1 LTG- OT  Outcome: Unable to achieve outcome(s) by discharge Date Met: 02/01/18 02/01/18 1639   Transfer Training OT LTG   Transfer Training OT LTG, Date Established 02/01/18   Transfer Training OT LTG, Time to Achieve by discharge   Transfer Training OT LTG, Outcome goal not met     Goal: Caregiver Training Goal LTG- OT  Outcome: Outcome(s) achieved Date Met: 02/01/18 01/04/18 1606 02/01/18 1639   Caregiver Training OT LTG   Caregiver Training OT LTG, Date Established --  02/01/18   Caregiver Training OT LTG, Time to Achieve by discharge --    Caregiver Training OT LTG, [place] Level able to assist adequately;able to cue patient adequately --    Caregiver Training OT LTG, Outcome --  goal met     Goal: Patient Education Goal LTG- OT  Outcome: Unable to achieve outcome(s) by discharge Date Met: 02/01/18 02/01/18 1639   Patient Education OT LTG   Patient Education OT LTG, Date Established 02/01/18   Patient Education OT LTG Outcome goal not met   Patient Education OT LTG, Reason Goal Not Met unable to make needed progress     Goal: Follow Directions Goal LTG- OT  Outcome: Unable to achieve outcome(s) by discharge Date Met: 02/01/18 02/01/18 1639   Follow Directions OT LTG   Follow Directions OT LTG, Date Established 02/01/18   Follow Directions OT LTG, Outcome goal not met   Follow Directions OT LTG, Reason Goal Not Met unable to make needed progress      Goal: Tracking Goal LTG- OT  Outcome: Unable to achieve outcome(s) by discharge Date Met: 02/01/18 02/01/18 1639   Tracking OT LTG   Tracking OT LTG, Date Established 02/01/18   Tracking OT LTG, Reason Goal Not Met unable to make needed progress     Goal: ADL Goal STG- OT  Outcome: Unable to achieve outcome(s) by discharge Date Met: 02/01/18 02/01/18 1639   ADL OT STG   ADL OT STG, Date Established 02/01/18   ADL OT STG, Outcome goal not met   ADL OT STG, Reason Goal Not Met unable to make needed progress     Goal: ADL Goal LTG- OT  Outcome: Unable to achieve outcome(s) by discharge Date Met: 02/01/18 01/04/18 1606 02/01/18 1639   ADL OT LTG   ADL OT LTG, Date Established --  02/01/18   ADL OT LTG, Time to Achieve by discharge --    ADL OT LTG, Reason Goal Not Met --  unable to make needed progress

## 2018-02-01 NOTE — PROGRESS NOTES
Inpatient Rehabilitation Plan of Care Note    Plan of Care  Care Plan Reviewed - No updates at this time.    Safety    Performed Intervention(s)  Non skid footware  BA, chair alarm, safety strategies, falls protocol  items in reach      Sphincter Control    Performed Intervention(s)  Btrm schdule every 4 hours  Monitor I and O  Bowel program    Signed by: Becky Acuña RN

## 2018-02-01 NOTE — PROGRESS NOTES
Inpatient Rehabilitation Functional Measures Assessment    Functional Measures  OTTONIEL Eating:  [facility] Grooming: [facility] Bathing:  [facility] Upper Body Dressing:  [facility] Lower Body Dressing:  [facility] Toileting:  [facility] Bladder Management  Level of Assistance:  [place]  Frequency/Number of Accidents this Shift:  [facility] Bowel Management  Level of Assistance: [place]  Frequency/Number of Accidents this Shift: [facility] Bed/Chair/Wheelchair Transfer:  [facility] Toilet Transfer:  [facility] Tub/Shower Transfer:  [place]    Previously Documented Mode of Locomotion at Discharge: Field  OTTONIEL Expected Mode of Locomotion at Discharge: [facility] Walk/Wheelchair:  [facility] Stairs:  [facility] Comprehension:  Auditory comprehension is the usual mode. Patient does not  comprehend complex/abstract information in their primary language without  assistance from a helper. Comprehension Score = 3, Moderate Prompting. Patient  comprehends basic daily needs or ideas 50-74% of the time. Patient requires  moderate/some prompting. No assistive devices were required.  OTTONIEL Expression:  Vocal expression is the usual mode. Patient does not express  complex/abstract information in their primary language without a helper.  Expression Score = 3, Moderate Prompting. Patient expresses basic daily needs or  ideas 50-74% of the time. Patient requires moderate/some prompting. No assistive  devices were required.  OTTONIEL Social Interaction:  Social Interaction Score = 3, Moderate Direction.  Patient interacts appropriately 50-74% of the time.  Patient requires  moderate/some direction for the following behavior(s):  OTTONIEL Problem Solving:  Activity was not observed.  OTTONIEL Memory:  Memory Score = 6, Modified [place].  Patient is modified  independent for memory, requiring: Requires additional time.    Therapy Mode Minutes  Occupational Therapy: [place]  Physical Therapy: [place]  Speech Language Pathology:   [place]    Signed by: Elizabeth Elias RN

## 2018-02-01 NOTE — THERAPY DISCHARGE NOTE
Inpatient Rehabilitation - Speech Language Pathology /Discharge  [facility]     Patient Name: Masha Porter  : 1960  MRN: [id number]  Today's Date: 2018         Admit Date: 1/3/2018    Visit Dx:     ICD-10-CM ICD-9-CM   1. Primary lung cancer, left C34.92 162.9   2. Oropharyngeal dysphagia R13.12 787.22     Patient Active Problem List   Diagnosis   • Expressive aphasia   • Dyslipidemia   • Cardioembolic stroke   • Diastolic CHF, chronic   • Aortic insufficiency   • Mitral regurgitation   • Atelectasis   • CVA (cerebral vascular accident)   • Lung cancer, lower lobe   • Bone metastasis   • Oropharyngeal dysphagia              Adult Rehabilitation Note       18 1030 18 0916 18 [id number]    Rehab Assessment/Intervention    Discipline speech language pathologist  -SL physical therapist  -MD speech language pathologist  -SL    Document Type therapy note (daily note)  -SL therapy note (daily note);discharge summary  -MD therapy note (daily note)  -SL    Subjective Information no complaints;agree to therapy  -SL no complaints;agree to therapy  -MD no complaints;agree to therapy  -SL    Patient Effort, Rehab Treatment good  -SL good  -MD adequate  -SL    Symptoms Noted During/After Treatment none  -SL none  -MD     Precautions/Limitations NPO;fall precautions  -SL fall precautions  -MD fall precautions;NPO  -SL    Specific Treatment Considerations   nasal O2 in place  -SL    Patient Response to Treatment performed much better today in tx- alert, awake for both sessions- not as fatiqued  -      Equipment Issued to Patient  gait belt  -MD     Recorded by [] Malia Turner MS CCC-SLP [MD] Lara White, PT [] Malia Turner, MS CCC-SLP    Vital Signs    Pre SpO2 (%)  92   w 2L O2 97%  -MD     O2 Delivery Pre Treatment  room air  -MD     Intra SpO2 (%)  83   w 2L O2 94% after ambulating up and down 4 steps  -MD     O2 Delivery Intra Treatment  room air  -MD     Post SpO2 (%)  88  -MD     O2 Delivery Post  Treatment  room air  -MD     Pre Patient Position  Sitting  -MD     Intra Patient Position  Sitting   following 80` of ambulation  -MD     Post Patient Position  Sitting  -MD     Recovery Time  approx 30 sec  -MD     Recorded by  [MD] Lara White PT     Pain Assessment    Pain Assessment  No/denies pain  -MD     Recorded by  [MD] Lara White PT     Vision Assessment/Intervention    Visual Impairment  visual impairment, right  -MD     Visual Field  right visual field impairment  -MD     Recorded by  [MD] Lara White PT     Cognitive Assessment/Intervention    Current Cognitive/Communication Assessment impaired  -SL impaired  -MD impaired  -SL    Orientation Status oriented to;person;place  -SL oriented to;person  -MD oriented to;person  -SL    Follows Commands/Answers Questions needs increased time;needs cueing  -SL 75% of the time;needs cueing;needs increased time;needs repetition  -MD needs cueing;needs increased time  -SL    Personal Safety  decreased insight to deficits  -MD     Personal Safety Interventions  fall prevention program maintained;gait belt;muscle strengthening facilitated;nonskid shoes/slippers when out of bed;supervised activity  -MD     Recorded by [SL] Malia Turner MS CCC-SLP [MD] Lara White PT [SL] Malia Turner MS CCC-SLP    Improve ability to comprehend words/phrases/sentences    Improve ability to comprehend words/phrases/sentences through: identify pictures, field of  -SL      Ability to Comprehend Words/Phrases/Sentences Progress 60%;70%;with inconsistent cues  -SL      Recorded by [SL] Malia Turner MS CCC-SLP      Improve ability to comprehend questions    Improve ability to comprehend questions:   simple yes/no questions  -SL    Ability to Comprehend Questions Progress   60%;with inconsistent cues  -SL    Recorded by   [SL] Malia Turner MS CCC-SLP    Improve word retrieval skills    Word Retrieval Skills Progress   10%;without cues;60%;with consistent cues;following model   naming items; phonemic  cues/imitative production beneficial  -SL    Comments: Improve word retrieval skills 100% for aut  tasks with min cues from slo ( needed for initiation and intermittently during longer production of months)- improved intelligibility of stimulus  -SL  80% for sent completions with one word with added phonemic cues  -SL    Recorded by [SL] Malia Turner MS CCC-SLP  [SL] Malia Turner MS CCC-SLP    Improve motor planning    Improve motor planning to reduce apraxia by: imitating mouth postures;imitating vowels  -SL      Motor Planning Progress 80%;with inconsistent cues  -SL      Recorded by [SL] Malia Turner MS CCC-SLP      Improve planning and execution of connected speech    Planning and Execution of Connected Speech Progress 50%;with inconsistent cues   response to ?- [facility], level 1  -SL  40%;with consistent cues   songs/rhymes w/cues form slp  -SL    Comments: Improve planning and execution of connected speech 100% for production of words/phrases in unison with slp using melodic intonation; imporved clarity of productions; noted improved spont production of stimulus after slp asked question-  -SL      Recorded by [SL] Malia Turner MS CCC-SLP  [SL] Malia Turner MS CCC-SLP    Bed Mobility, Assessment/Treatment    Bed Mobility, Roll Left, [place]  verbal cues required;contact guard assist  -MD     Bed Mobility, Roll Right, [place]  verbal cues required;minimum assist (75% patient effort)  -MD     Bed Mobility, Scoot/Bridge, [place]  supervision required;verbal cues required  -MD     Bed Mob, Supine to Sit, [place]  verbal cues required;nonverbal cues required (demo/gesture);minimum assist (75% patient effort)  -MD     Bed Mob, Sit to Supine, [place]  verbal cues required;minimum assist (75% patient effort)  -MD     Bed Mobility, Safety Issues  cognitive deficits limit understanding;decreased use of arms for pushing/pulling;decreased use of legs for bridging/pushing  -MD     Bed Mobility, Impairments   strength decreased;impaired balance;coordination impaired;motor control impaired  -MD     Bed Mobility, Comment  Pt up in chair when PT arrived this am.  Pt spouse assisted pt out of bed and to chair.  -MD     Recorded by  [MD] Lara White, PT     Transfer Assessment/Treatment    Transfers, Sit-Stand [place]  verbal cues required;contact guard assist;minimum assist (75% patient effort)  -MD     Transfers, Stand-Sit [place]  verbal cues required;contact guard assist;minimum assist (75% patient effort)  -MD     Transfers, Sit-Stand-Sit, Assist Device  other (see comments)   HHA  -MD     Transfer, Safety Issues  weight-shifting ability decreased;step length decreased;sequencing ability decreased;balance decreased during turns  -MD     Transfer, Impairments  strength decreased;impaired balance;coordination impaired;motor control impaired;pain  -MD     Transfer, Comment  car transfer: CGA  -MD     Recorded by  [MD] Lara White, PT     Gait Assessment/Treatment    Gait, [place] Level  verbal cues required;minimum assist (75% patient effort)  -MD     Gait, Assistive Device  other (see comments)   HHA  -MD     Gait, Distance (Feet)  80  -MD     Gait, Gait Deviations  bilateral:;luke decreased;forward flexed posture;left:;antalgic;right:;decreased heel strike;toe-to-floor clearance decreased  -MD     Gait, Safety Issues  step length decreased  -MD     Gait, Impairments  strength decreased;impaired balance;coordination impaired;motor control impaired  -MD     Recorded by  [MD] Lara White, PT     Stairs Assessment/Treatment    Number of Stairs  4  -MD     Stairs, Handrail Location  none  -MD     Stairs, [place] Level  minimum assist (75% patient effort);contact guard assist;2 person assist required  -MD     Stairs, Technique Used  step to step (ascending);step to step (descending)  -MD     Stairs, Safety Issues  sequencing ability decreased  -MD     Stairs, Impairments  strength decreased;impaired  balance;coordination impaired  -MD     Stairs, Comment  Practiced steps with spouse providing HHA, Min A and PT providing CGA due to first attempt and family teaching.  In PM spouse completed steps w/o CGA from PT.  Pt went up and down curb step w Min A  -MD     Recorded by  [MD] Lara White PT     Balance Skills Training    Gait Balance-Level of Assistance  Minimum assistance  -MD     Gait Balance Support  Left upper extremity supported  -MD     Gait Balance Activities  uneven surface  -MD     Gait Balance # of Minutes  --   20`  -MD     Recorded by  [MD] Lara White PT     Positioning and Restraints    Pre-Treatment Position  sitting in chair/recliner  -MD     Post Treatment Position  other  -MD     Other Position  return to room with caregiver  -MD     Recorded by  [MD] Lara White, PT       01/31/18 1543 01/31/18 1449 01/31/18 1203    Rehab Assessment/Intervention    Discipline occupational therapist  -DN physical therapist  -MD occupational therapist  -DN    Document Type therapy note (daily note)  -DN therapy note (daily note)  -MD therapy note (daily note)  -DN    Subjective Information agree to therapy;complains of;fatigue;pain  -DN agree to therapy;complains of;fatigue  -MD agree to therapy;complains of;fatigue  -DN    Patient Effort, Rehab Treatment  good  -MD good  -DN    Symptoms Noted During/After Treatment fatigue;shortness of breath;increased pain  -DN fatigue;shortness of breath  -MD     Symptoms Noted Comment  In am pt experienced SOA after walking 40ft.  O2 was 83% following a rest break of 2-3 min O2 was 88% RN was notified of these events.  -MD pt C/O fatigue, declined shower, oppeded to complete rue ROM activities instead  -DN    Precautions/Limitations fall precautions  -DN fall precautions  -MD     Equipment Issued to Patient  gait belt  -MD     Recorded by [DN] Justo Henry, OTR [MD] Lara White, PT [DN] Justo Henry, OTR    Pain Assessment    Pain Assessment Coelho-Lauren FACES  -DN No/denies pain   -MD No/denies pain  -DN    Coelho-Lauren FACES Pain Rating 4  -DN      Pain Score 4  -DN      Post Pain Score 4  -DN      Pain Type Surgical pain  -DN      Pain Location --   G tube sight  -DN      Pain Intervention(s) Repositioned  -DN      Recorded by [DN] Justo Henry OTR [MD] Lara White PT [DN] SABRINA Smith    Cognitive Assessment/Intervention    Current Cognitive/Communication Assessment impaired  -DN impaired  -MD     Orientation Status oriented to;person  -DN oriented to;person  -MD     Follows Commands/Answers Questions 50% of the time;able to follow single-step instructions;needs cueing;needs increased time  -DN 75% of the time;needs cueing;needs repetition  -MD     Personal Safety at risk behaviors demonstrated;decreased awareness, need for safety;decreased awareness, need for assist;decreased insight to deficits;unaware of functional deficits;unaware of consequences of deficits;unaware of cognitive deficits  -DN decreased insight to deficits  -MD     Personal Safety Interventions gait belt;fall prevention program maintained  -DN fall prevention program maintained;gait belt;muscle strengthening facilitated;nonskid shoes/slippers when out of bed;supervised activity  -MD     Recorded by [DN] Justo Henry OTR [MD] Lara White PT     Bed Mobility, Assessment/Treatment    Bed Mob, Supine to Sit, [place]  not tested  -MD     Bed Mob, Sit to Supine, [place]  not tested  -MD     Recorded by  [MD] Lara White PT     Transfer Assessment/Treatment    Transfers, Chair-Bed [place] moderate assist (50% patient effort);verbal cues required;set up required  -DN      Transfers, Sit-Stand [place]  verbal cues required;contact guard assist;minimum assist (75% patient effort)  -MD     Transfers, Stand-Sit [place]  verbal cues required;contact guard assist;minimum assist (75% patient effort)  -MD     Toilet Transfer, [place] minimum assist (75% patient effort);verbal cues required;nonverbal  cues required (demo/gesture)  -DN      Toilet Transfer, Assistive Device wheelchair;bedside commode without drop arms  -DN      Transfer, Safety Issues weight-shifting ability decreased;step length decreased;sequencing ability decreased;balance decreased during turns  -DN weight-shifting ability decreased;step length decreased;sequencing ability decreased;balance decreased during turns  -MD     Transfer, Impairments strength decreased;impaired balance;coordination impaired;motor control impaired;pain  -DN strength decreased;impaired balance;coordination impaired;motor control impaired;muscle tone abnormal;impaired vision  -MD     Transfer, Comment  car transfer: Min A  -MD     Recorded by [JOSE ALBERTO] SABRINA Smith [MD] Lara White PT     Gait Assessment/Treatment    Gait, [place] Level  verbal cues required;minimum assist (75% patient effort)  -MD     Gait, Distance (Feet)  80   x1 and 40` x1  -MD     Gait, Gait Deviations  bilateral:;luke decreased;forward flexed posture;step length decreased;left:;antalgic;right:;decreased heel strike;toe-to-floor clearance decreased  -MD     Gait, Safety Issues  step length decreased;weight-shifting ability decreased  -MD     Gait, Impairments  strength decreased;impaired balance;coordination impaired;motor control impaired  -MD     Recorded by  [MD] Lara White PT     Upper Body Bathing Assessment/Training    UB Bathing Assess/Train, Position sitting;sink side  -DN      UB Bathing Assess/Train, [place] Level hand over hand;maximum assist (25% patient effort);verbal cues required;nonverbal cues required (demo/gesture);set up required  -DN      UB Bathing Assess/Train, Impairments strength decreased;coordination impaired;motor control impaired;impaired vision  -DN      Recorded by [JOSE ALBERTO] SABRINA Smith      Lower Body Bathing Assessment/Training    LB Bathing Assess/Train, Position sitting;standing  -DN      LB Bathing Assess/Train, [place] Level dependent (less  than 25% patient effort);verbal cues required;nonverbal cues required (demo/gesture);set up required  -DN      LB Bathing Assess/Train, Impairments impaired balance;strength decreased;motor control impaired;impaired vision  -DN      Recorded by [JOSE ALBERTO] SABRNIA Smith      Upper Body Dressing Assessment/Training    UB Dressing Assess/Train, Clothing Type donning:;doffing:;pull over;t-shirt  -DN      UB Dressing Assess/Train, Assist Device pascual technique  -DN      UB Dressing Assess/Train, Position sitting  -DN      UB Dressing Assess/Train, [place] maximum assist (25% patient effort);verbal cues required;nonverbal cues required (demo/gesture)  -DN      UB Dressing Assess/Train, Impairments strength decreased;coordination impaired;motor control impaired  -DN      Recorded by [JOSE ALBERTO] SABRINA Smith      Lower Body Dressing Assessment/Training    LB Dressing Assess/Train, Clothing Type doffing:;donning:;pants;shoes;socks  -DN      LB Dressing Assess/Train, Position sitting;standing  -DN      LB Dressing Assess/Train, [place] dependent (less than 25% patient effort);2 person assist required  -DN      LB Dressing Assess/Train, Impairments motor control impaired;coordination impaired;impaired balance;strength decreased  -DN      Recorded by [JOSE ALBERTO] SABRINA Smith      Toileting Assessment/Training    Toileting Assess/Train, Assistive Device grab bars;bedside commode  -DN      Toileting Assess/Train, Position sitting;standing  -DN      Toileting Assess/Train, [place] Level dependent (less than 25% patient effort);2 person assist required;verbal cues required;nonverbal cues required (demo/gesture)  -DN      Toileting Assess/Train, Impairments strength decreased;impaired balance;coordination impaired;motor control impaired  -DN      Recorded by [JOSE ALBERTO] SABRINA Smith      Therapy Exercises    Bilateral Lower Extremities  AROM:;20 reps;supine;ankle pumps/circles;hip abduction/adduction;heel slides;quad  sets;SAQ;SLR  -MD     Right Upper Extremity   PROM:;AAROM:;15 reps;supine;elbow flexion/extension;pronation/supination;shoulder abduction/adduction;shoulder circles;shoulder extension/flexion;shoulder ER/IR;shoulder horizontal abd/add;shoulder protraction/retraction  -DN    Recorded by  [MD] Lara White, PT [DN] SABRINA Smith    Neuromuscular Re-education    Neuromuscular Re-Ed Techniques   Weight bearing RUE;Facilitation isolated movement ([place]);Gross motor task- grasp/release  -DN    Facilitation Isolated Movement   right:;scapula;shoulder;forearm;elbow;wrist;finger;thumb  -DN    Detail (Neuromuscular Re-Education)   completed AAROM/PROM, followed by active movement of RUE to grab cups and place with min a:  Pt now jose alejandro to actively move RUE  in all planes at varous degrees  -DN    Recorded by   [JOSE ALBERTO] SABRINA Smith    Positioning and Restraints    Pre-Treatment Position in bed  -DN in bed  -MD in bed  -DN    Post Treatment Position bed  -DN bed  -MD bed  -DN    In Bed supine;with family/caregiver  -DN supine;call light within reach;with family/caregiver  -MD supine;call light within reach;encouraged to call for assist;with family/caregiver  -DN    Recorded by [DN] SABRINA Smith [MD] Lara White, PT [DN] SABRINA Smith      01/31/18 0900 01/30/18 1540 01/30/18 1221    Rehab Assessment/Intervention    Discipline speech language pathologist  -SL occupational therapist  -DN occupational therapist  -DN    Document Type therapy note (daily note)  -SL therapy note (daily note)  -DN therapy note (daily note)  -DN    Subjective Information agree to therapy  -SL no complaints;agree to therapy  -DN agree to therapy;no complaints  -DN    Patient Effort, Rehab Treatment fair  -SL good  -DN good  -DN    Symptoms Noted During/After Treatment fatigue  -SL fatigue  -DN fatigue  -DN    Precautions/Limitations NPO;fall precautions  -SL fall precautions;NPO;swallowing precautions  -DN fall precautions;NPO;swallowing  precautions  -DN    Precautions/Limitations, Vision  vision impairment, right  -DN vision impairment, right  -DN    Patient Response to Treatment pt very fatiqued during session, closing eyes- needed cues to stay awake and participate;   -SL      Recorded by [SL] Malia Turner MS CCC-SLP [DN] SABRINA Smith [DN] SABRINA Smith    Pain Assessment    Pain Assessment  Coelho-Lauren FACES  -DN Coelho-Baker FACES  -DN    Coelho-Lauren FACES Pain Rating  2  -DN 2  -DN    Pain Score  2  -DN 2  -DN    Pain Type  Surgical pain  -DN Surgical pain  -DN    Pain Location  --   G-Tube placement sight  -DN --   g tube sight  -DN    Pain Intervention(s)  Repositioned  -DN     Recorded by  [DN] SABRINA Smith [DN] SABRINA Smith    Vision Assessment/Intervention    Visual Impairment  visual impairment, right  -DN visual impairment, right;inattention to the right;right neglect  -DN    Recorded by  [DN] SABRINA Smith [DN] SABRINA Smith    Cognitive Assessment/Intervention    Current Cognitive/Communication Assessment impaired  -SL impaired  -DN impaired  -DN    Orientation Status oriented to;person  -SL person  -DN oriented to;person  -DN    Follows Commands/Answers Questions needs increased time;needs cueing   did not follow any 1 step verbal command appropriately  -SL 25% of the time  -DN 25% of the time;able to follow single-step instructions;needs cueing;needs repetition;needs increased time  -DN    Personal Safety  decreased awareness, need for assist;decreased awareness, need for safety;decreased insight to deficits;fully aware of deficits;unaware of cognitive deficits;unaware of consequences of deficits;unaware of functional deficits;one on one supervision required for safety  -DN decreased awareness, need for assist;decreased awareness, need for safety;decreased insight to deficits;at risk behaviors demonstrated;one on one supervision required for safety  -DN    Personal Safety Interventions  gait belt;fall  prevention program maintained;supervised activity  -DN gait belt;muscle strengthening facilitated;supervised activity  -DN    Recorded by [SL] Malia Turner MS CCC-SLP [DN] Justo Henry OTR [DN] SABRINA Smith    Improve ability to comprehend words/phrases/sentences    Improve ability to comprehend words/phrases/sentences through: identify pictures, field of  -SL      Ability to Comprehend Words/Phrases/Sentences Progress 40%;without cues  -SL      Comments: Improve ability to comprehend words/phrases/sentences piture id given name and descriptive cues  -SL      Recorded by [SL] Malia Turner MS CCC-SLP      Improve ability to follow directions    Ability to Follow Directions Progress 0%;without cues;50%;with consistent cues;following model   visual model and tactile cues needed  -SL      Comments: Improve ability to follow directions pt very perseverative; poor attn; drowsy  -SL      Recorded by [] Malia Turner MS CCC-SLP      Improve word retrieval skills    Comments: Improve word retrieval skills 80% for counting, days, and months with cues for initiation and intermittently during sequence  -SL      Recorded by [] Malia Turner MS CCC-SLP      Improve motor planning    Improve motor planning to reduce apraxia by: imitating vowels;imitating mouth postures  -SL      Motor Planning Progress 60%;70%;with inconsistent cues   random vowel imitation  -SL      Comments: Improve motor planning to Comments: Reduce apraxia unable to protrude tongue, lateralize or elevate; could pucker and smile- r side facial droop remains  -SL      Recorded by [] Malia Turner MS CCC-SLP      Transfer Assessment/Treatment    Transfers, Chair-Bed [place]   minimum assist (75% patient effort);verbal cues required  -DN    Toilet Transfer, [place]   minimum assist (75% patient effort);verbal cues required  -DN    Toilet Transfer, Assistive Device   wheelchair;bedside commode without drop arms  -DN    Transfer, Safety Issues    weight-shifting ability decreased;step length decreased;sequencing ability decreased;balance decreased during turns  -DN    Transfer, Impairments   strength decreased;impaired balance;coordination impaired;motor control impaired;muscle tone abnormal;impaired vision  -DN    Recorded by   [DN] SABRINA Smith    Upper Body Bathing Assessment/Training    UB Bathing Assess/Train, Position   sitting;sink side  -DN    UB Bathing Assess/Train, [place] Level   maximum assist (25% patient effort);set up required;supervision required;verbal cues required  -DN    UB Bathing Assess/Train, Impairments   strength decreased;sensation decreased;impaired balance;coordination impaired;motor control impaired;impaired vision  -DN    Recorded by   [DN] SABRINA Smith    Lower Body Bathing Assessment/Training    LB Bathing Assess/Train, Comment   already bathed after commode  -DN    Recorded by   [DN] SABRINA Smith    Upper Body Dressing Assessment/Training    UB Dressing Assess/Train, Clothing Type   doffing:;donning:;pull over;t-shirt  -DN    UB Dressing Assess/Train, Assist Device   pascual technique  -DN    UB Dressing Assess/Train, Position   sitting  -DN    UB Dressing Assess/Train, [place]   maximum assist (25% patient effort);verbal cues required;nonverbal cues required (demo/gesture);set up required  -DN    UB Dressing Assess/Train, Impairments   strength decreased;impaired balance;coordination impaired;motor control impaired;impaired vision  -DN    Recorded by   [JOSE ALBERTO] SABRINA Smith    Lower Body Dressing Assessment/Training    LB Dressing Assess/Train, Comment   already completed in am  -DN    Recorded by   [JOSE ALBERTO] SABRINA Smith    Toileting Assessment/Training    Toileting Assess/Train, Assistive Device   bedside commode  -DN    Toileting Assess/Train, Position   sitting;standing  -DN    Toileting Assess/Train, [place] Level   dependent (less than 25% patient effort);verbal cues required;nonverbal cues  required (demo/gesture)  -DN    Toileting Assess/Train, Impairments   motor control impaired;coordination impaired;impaired balance;strength decreased;sensation decreased  -DN    Recorded by   [JOSE ALBERTO] SABRINA Smith    Therapy Exercises    Right Upper Extremity  AROM:;10 reps;supine;elbow flexion/extension;pronation/supination;shoulder abduction/adduction;shoulder extension/flexion;shoulder circles;shoulder ER/IR;shoulder horizontal abd/add;shoulder protraction/retraction  -DN     Recorded by  [JOSE ALBERTO] SABRINA Smith     Neuromuscular Re-education    Neuromuscular Re-Ed Techniques  Weight bearing RUE;Facilitation isolated movement ([place]);Functional Movement Patterns;Gross motor task- grasp/release  -DN     Facilitation Isolated Movement  right:;scapula;shoulder;elbow;forearm;wrist;finger;thumb  -JOSE ALBERTO     Detail (Neuromuscular Re-Education)  pt presents with shoulder, elbow, forearm, wrist and finger movements WFL with delay and motor planning issues, and R thumb is trace movememnt  -DN     Recorded by  [JOSE ALBERTO] SABRINA Smith     Positioning and Restraints    Pre-Treatment Position  in bed  -DN --   sitting in w/c  -DN    Post Treatment Position  bed  -DN bed  -DN    In Bed  encouraged to call for assist;call light within reach;with family/caregiver  -DN supine;exit alarm on;with family/caregiver;encouraged to call for assist;call light within reach  -DN    Recorded by  [JOSE ALBERTO] SABRINA Smith [DN] SABRINA Smith      01/30/18 1025 01/30/18 0820       Rehab Assessment/Intervention    Discipline speech language pathologist  -SL physical therapist  -MD     Document Type therapy note (daily note)  -SL therapy note (daily note)  -MD     Subjective Information no complaints  -WING agree to therapy;complains of;fatigue  -MD     Patient Effort, Rehab Treatment good  -SL good  -MD     Symptoms Noted During/After Treatment fatigue  -SL none  -MD     Precautions/Limitations NPO;fall precautions  -WING fall precautions  -MD      Equipment Issued to Patient  gait belt  -MD     Recorded by [SL] Malia Turner MS CCC-SLP [MD] Lara White, PT     Pain Assessment    Pain Assessment  No/denies pain  -MD     Recorded by  [MD] Lara White PT     Vision Assessment/Intervention    Visual Impairment  visual impairment, right;inattention to the right;needs cues to attend visually;decreased visual tracking;right neglect  -MD     Visual Field  right visual field impairment  -MD     Recorded by  [MD] Lara White PT     Cognitive Assessment/Intervention    Current Cognitive/Communication Assessment impaired  -SL impaired  -MD     Orientation Status oriented to;person  -SL oriented to;person  -MD     Follows Commands/Answers Questions 25% of the time;50% of the time;able to follow single-step instructions;needs increased time;needs cueing   needs visual cues/tactile cues  -SL 50% of the time;needs cueing;needs increased time;needs repetition  -MD     Personal Safety  decreased awareness, need for assist;decreased awareness, need for safety;decreased insight to deficits  -MD     Personal Safety Interventions  gait belt;muscle strengthening facilitated;nonskid shoes/slippers when out of bed;supervised activity;fall prevention program maintained  -MD     Recorded by [SL] Malia Turner MS CCC-SLP [MD] Laar White, PT     Improve ability to comprehend words/phrases/sentences    Ability to Comprehend Words/Phrases/Sentences Progress 50%;without cues   picture (w/word written underneath stim) ID - [id number]  -SL      Recorded by [SL] Malia Turner MS CCC-SLP      Improve ability to follow directions    Ability to Follow Directions Progress 20%;without cues;50%;with consistent cues;following model  -SL      Recorded by [SL] Malia Turner MS CCC-SLP      Improve ability to comprehend questions    Improve ability to comprehend questions: simple yes/no questions  -SL      Ability to Comprehend Questions Progress 40%;50%;without cues  -SL      Recorded by [SL] Malia Turner MS CCC-SLP       Improve word retrieval skills    Word Retrieval Skills Progress 0%;without cues;50%;with consistent cues;following model   naming  -SL      Comments: Improve word retrieval skills 40% - phrase completions w/1 word  -SL      Recorded by [SL] Malia Turner MS CCC-SLP      Improve ability to construct phrase and sentence level responses    Comments: Improve ability to construct phrase and sentence level responses 30- 50% for songs, rhymes; counting, days, months, and abc..- 80%  -SL      Recorded by [SL] Malia Turner MS CCC-SLP      Improve motor planning    Improve motor planning to reduce apraxia by: imitating vowels;following isolated oral commands  -SL      Motor Planning Progress 80%;90%;with inconsistent cues;following model   vowels  -SL      Comments: Improve motor planning to Comments: Reduce apraxia simple cvc combinations w/max cues-20%  -SL      Recorded by [SL] Malia Turner MS CCC-SLP      Improve planning and execution of connected speech    Comments: Improve planning and execution of connected speech still has difficulty w/oral motor mvmt imitation, but could pucker, smile, and lateralize tongu w/visual cues  -SL      Recorded by [SL] Malia Turner MS CCC-SLP      Bed Mobility, Assessment/Treatment    Bed Mob, Supine to Sit, [place]  verbal cues required;minimum assist (75% patient effort);moderate assist (50% patient effort);2 person assist required  -MD     Bed Mob, Sit to Supine, [place]  not tested  -MD     Bed Mobility, Safety Issues  cognitive deficits limit understanding;decreased use of arms for pushing/pulling;decreased use of legs for bridging/pushing  -MD     Bed Mobility, Impairments  strength decreased;impaired balance;coordination impaired;motor control impaired  -MD     Recorded by  [MD] Lara White PT     Transfer Assessment/Treatment    Transfers, Bed-Chair [place]  verbal cues required;contact guard assist  -MD     Transfers, Chair-Bed [place]  not tested  -MD     Transfers,  Bed-Chair-Bed, Assist Device  bed rails  -MD     Transfers, Sit-Stand Door  verbal cues required;minimum assist (75% patient effort)  -MD     Transfers, Stand-Sit Door  verbal cues required;minimum assist (75% patient effort)  -MD     Transfer, Safety Issues  weight-shifting ability decreased;step length decreased;balance decreased during turns  -MD     Transfer, Impairments  strength decreased;sensation decreased;ROM decreased;impaired balance;coordination impaired;motor control impaired  -MD     Recorded by  [MD] Lara White PT     Gait Assessment/Treatment    Gait, Door Level  verbal cues required;contact guard assist;minimum assist (75% patient effort)  -MD     Gait, Assistive Device  other (see comments)   HHA  -MD     Gait, Distance (Feet)  80   x2  -MD     Gait, Gait Deviations  bilateral:;luke decreased;forward flexed posture;step length decreased;left:;antalgic;right:;decreased heel strike;weight-shifting ability decreased;narrow base;limb motion velocity decreased  -MD     Gait, Safety Issues  step length decreased;weight-shifting ability decreased;sequencing ability decreased  -MD     Gait, Impairments  strength decreased;impaired balance;coordination impaired;motor control impaired  -MD     Recorded by  [MD] Lara White PT     Therapy Exercises    Bilateral Lower Extremities  AROM:;20 reps;supine;ankle pumps/circles;hip abduction/adduction;heel slides;quad sets;SAQ;SLR  -MD     Recorded by  [MD] Lara White PT     Positioning and Restraints    Pre-Treatment Position  in bed  -MD     Post Treatment Position  bed  -MD     In Bed  supine;call light within reach;with family/caregiver  -MD     Recorded by  [MD] Lara White PT       User Key  (r) = Recorded By, (t) = Taken By, (c) = Cosigned By    Initials Name Effective Dates    SL Malia Turner, MS CCC-SLP 04/13/15 -     JOSE ALBERTO Henry, OTR 04/13/15 -     MD Lara White, PT 12/01/15 -               IP SLP Goals       01/26/18 1310 01/25/18  1220       Begin to Take Some PO Safely    Begin to Take Some PO Safely- SLP, Outcome  goal ongoing  -JJ     Expressive- Optimal Participation in Care    Expressive Optimal Participation in Care- SLP, Time to Achieve by discharge  -NR      Expressive Optimal Participation in Care- SLP, Activity Level Patient will improve word retrieval skills  -NR      Expressive Optimal Participation in Care- SLP, Outcome goal ongoing  -NR      Expressive Optimal Participation in Care- SLP, Reason Goal Not Met progress slower than expected  -NR        User Key  (r) = Recorded By, (t) = Taken By, (c) = Cosigned By    Initials Name Provider Type    CHADD Lawton, MS CCC-SLP Speech and Language Pathologist    NR Julianne Edmond MA,[facility]-SLP Speech and Language Pathologist          EDUCATION  The patient has been educated in the following areas:   Cognitive Impairment Communication Impairment Dysphagia (Swallowing Impairment) NPO rationale.    SLP Recommendation and Plan              Anticipated Discharge Disposition: home with 24/7 care                          SLP Outcome Measures (last 72 hours)      SLP Outcome Measures       02/01/18 1625          SLP Outcome Measures    Outcome Measure Used? Adult NOMS  -      FCM Scores    FCM Chosen Swallowing  -      Swallowing FCM Score 1  -      Spoken Language Comprehension FCM Score 2  -SL      Spoken Language Expression FCM Score 2  -SL        User Key  (r) = Recorded By, (t) = Taken By, (c) = Cosigned By    Initials Name Effective Dates    SL Malia Turner, MS CCC-SLP 04/13/15 -             Time Calculation:         Time Calculation- SLP       02/01/18 1030 02/01/18 0859 02/01/18 0730    Time Calculation- SLP    SLP Start Time 1000  - 0830  -     SLP Stop Time 1030  - 0900  -     SLP Time Calculation (min) 30 min  - 30 min  -     SLP Non-Billable Time (min)   15 min   rounds  -      User Key  (r) = Recorded By, (t) = Taken By, (c) = Cosigned By    Initials Name  Provider Type     Malia Turner MS CCC-SLP Speech and Language Pathologist          Therapy Charges for Today     Code Description Service Date Service Provider Modifiers Qty    18422524297 HC ST TREATMENT SPEECH 2 1/31/2018 Malia Turner MS CCC-SLP GN 1    [id number] HC ST TREATMENT SPEECH 4 2/1/2018 Malia Turner MS CCC-SLP GN 1          SLP G-Codes  Functional Limitations: Swallowing  Swallow Current Status (): At least 60 percent but less than 80 percent impaired, limited or restricted  Swallow Goal Status (): At least 40 percent but less than 60 percent impaired, limited or restricted  Swallow Discharge Status (): At least 60 percent but less than 80 percent impaired, limited or restricted    SLP Discharge Summary  Anticipated Discharge Disposition: home with 24/7 care  Reason for Discharge: Discharge from facility  Outcomes Achieved: Patient able to partially acheive established goals  Discharge Destination: Home with assist, Home with outpatient services    Malia Turner MS CCC-SLP  2/1/2018

## 2018-02-01 NOTE — PROGRESS NOTES
Pt's  seems more at ease with discharge plans. He has been doing the tube feedings and has given pt the lovenox injection.  He tells me pt's cousin has been a good support throughout this hospitalization and she will be helping when pt comes home tomorrow.     [facility] will be following for skilled nursing, PT, OT and ST. Tube feeding product will be provided by [facility]. A case of Jevity should be delivered to pt's room tomorrow morning.    Pt does qualify for home oxygen based on physical therapist's evaluation.  Will refer to [facility] for home oxygen when orders received.    Pt has received recommended medical equipment, wheelchair, cushion, BSC and tub bench.

## 2018-02-01 NOTE — PLAN OF CARE
Problem: Inpatient Physical Therapy  Goal: Bed Mobility Goal LTG- PT  Outcome: Unable to achieve outcome(s) by discharge Date Met: 02/01/18 02/01/18 1506   Bed Mobility PT LTG   Bed Mobility PT LTG, Date Goal Reviewed 02/01/18   Bed Mobility PT LTG, Outcome goal not met     Goal: Transfer Training Goal 1 LTG- PT   02/01/18 1506   Transfer Training PT LTG   Transfer Training PT LTG, Date Goal Reviewed 02/01/18   Transfer Training PT LTG, Outcome goal not met     Goal: Transfer Training Goal 2 LTG- PT  Outcome: Outcome(s) achieved Date Met: 02/01/18 02/01/18 1506   Transfer Training 2 PT LTG   Transfer Training PT 2 LTG, Date Goal Reviewed 02/01/18   Transfer Training PT 2 LTG, Outcome goal met     Goal: Gait Training Goal LTG- PT  Outcome: Unable to achieve outcome(s) by discharge Date Met: 02/01/18 02/01/18 1506   Gait Training PT LTG   Gait Training Goal PT LTG, Date Goal Reviewed 02/01/18   Gait Training Goal PT LTG, Outcome goal not met     Goal: Patient Education Goal LTG- PT  Outcome: Outcome(s) achieved Date Met: 02/01/18 02/01/18 1506   Patient Education PT LTG   Patient Education PT LTG, Date Goal Reviewed 02/01/18   Patient Education PT LTG Outcome goal met

## 2018-02-01 NOTE — PROGRESS NOTES
Inpatient Rehabilitation Functional Measures Assessment    Functional Measures  OTTONIEL Eating:  [facility] Grooming: [facility] Bathing:  [facility] Upper Body Dressing:  [facility] Lower Body Dressing:  [facility] Toileting:  [facility] Bladder Management  Level of Assistance:  [place]  Frequency/Number of Accidents this Shift:  [facility] Bowel Management  Level of Assistance: [place]  Frequency/Number of Accidents this Shift: [facility] Bed/Chair/Wheelchair Transfer:  Bed/chair/wheelchair Transfer Score = 4.  Patient performs 75% or more of effort and minimal assistance (little/incidental  help/lifting of one limb/steadying) for transferring to and from the  bed/chair/wheelchair, requiring: No assistive devices were required.  OTTONIEL Toilet Transfer:  [facility] Tub/Shower Transfer:  [place]    Previously Documented Mode of Locomotion at Discharge: Field  OTTONIEL Expected Mode of Locomotion at Discharge: [facility] Walk/Wheelchair:  WHEELCHAIR OBSERVATION   Activity was not observed.    WALK OBSERVATION   Walk Distance Scale = 2.  Distance walked is 50 -149 feet. Walk Score = 2.  Patient performs 75% or more of effort and requires minimal assistance.  Incidental assistance, contact guard or steadying was provided. Patient walked a  distance of 80 feet. No assistive devices were required.  OTTONIEL Stairs:  Stairs Score = 2.  Incidental assistance with lifting or lowering,  contact guard or steadying was provided. Patient performs 75% or more of effort  and requires minimal contact assistance. Patient negotiated  4 stairs. No  assistive devices were required.    OTTONIEL Comprehension:  [facility] Expression:  [facility] Social Interaction:  [facility] Problem Solving:  [facility] Memory:  [place]    Therapy Mode Minutes  Occupational Therapy: [place]  Physical Therapy: Individual: 60 minutes.  Speech Language Pathology:  [place]    Signed by: Lara White, PT

## 2018-02-01 NOTE — PROGRESS NOTES
Inpatient Rehabilitation Functional Measures Assessment and Plan of Care    Plan of Care  Updated Problems/Interventions  Field    Functional Measures  OTTONIEL Eating:  Eating Score = 1, Total Assistance. Patient requires total  assistance for complete nutrition or supplemental nutrition/hydration.  OTTONIEL Grooming: Patient requires no physical assistance for washing, rinsing and  drying the face. Patient requires moderate assistance for washing, rinsing and  drying the hands. Patient requires maximal assistance for brushing teeth.  Patient requires maximal assistance for brushing/combing hair. Shaving or  applying makeup not applicable for this patient. Patient performs 25 % of  grooming tasks. Grooming Score = 2, Maximal Assistance. No assistive devices  were required.  OTTONIEL Bathing:  Patient bathed in tub. Patient requires minimal assistance for  washing, rinsing, or drying the right arm. Patient requires total assistance for  washing, rinsing, or drying the left arm. Patient requires minimal assistance  for washing, rinsing, or drying the chest. Patient requires minimal assistance  for washing, rinsing, or drying the abdomen. Patient requires maximal assistance  for washing, rinsing, or drying the perineal area. Patient requires total  assistance for washing, rinsing, or drying the buttocks. Patient requires  minimal assistance for washing, rinsing, or drying the right upper leg. Patient  requires minimal assistance for washing, rinsing, or drying the left upper leg.  Patient requires maximal assistance for washing, rinsing, or drying the right  lower leg, including the foot. Patient requires maximal assistance for washing,  rinsing, or drying the left lower leg, including the foot. Patient performs 0 -  24% of bathing tasks.  Bathing Score = 1, Total Assistance. Patient requires the  following assistive device(s): Grab bar/arm rest to maintain balance. Hand held  shower.  OTTONIEL Upper Body Dressing:  Patient requires  total assistance for gathering  clothes. Wearing a bra or undershirt was not applicable for this patient.  Patient requires maximal/significant physical assistance for holding clothing  and/or threading the right arm through the garment (shirt/sweater). Patient  requires no physical assistance for threading the left arm through the garment  (shirt/sweater). Patient requires minimal/incidental physical assistance for  pulling an over-head-garment over head or pulling front-fastening-garment around  back. Patient requires total assistance for holding clothing and/or pulling an  over-head-garment down the trunk or adjusting/fastening together a  front-fastening-garment. Patient performs 60 % of upper body dressing tasks.  Upper Body Dressing Score = 3, Moderate Assistance. No assistive devices were  required.  OTTONIEL Lower Body Dressing:  Patient requires total assistance for gathering  clothes. Patient requires total assistance for holding clothing and/or threading  the right leg through the undergarment. Patient requires total assistance for  holding clothing and/or threading the left leg through the undergarment. Patient  requires maximal/significant physical assistance for holding clothing and/or  pulling undergarment over hips and adjusting fasteners. Patient requires total  assistance for holding clothing and/or threading the right leg through the  pants/skirt. Patient requires total assistance for holding clothing and/or  threading the left leg through the pants/skirt. Patient requires  maximal/significant physical assistance for holding clothing and/or pulling  pants/skirt over hips and adjusting fasteners. Patient requires total assistance  for holding clothing and/or donning and/or doffing right sock. Patient requires  total assistance for holding clothing and/or donning and/or doffing left sock.  Patient requires total assistance for holding clothing and/or donning and/or  doffing right shoe. Patient requires  total assistance for holding clothing  and/or donning and/or doffing left shoe. Patient performs 4.55 -  24% of lower  body dressing tasks. Lower Body Dressing  Score = 1, Total Assistance. No  assistive devices were required.  [facility] Toileting:  Patient requires moderate assistance for adjusting clothing  before using a toilet, commode, bedpan, or urinal. Patient requires total  assistance for hygiene. Patient requires moderate assistance for adjusting  clothing after using a toilet, commode, bedpan, or urinal. Patient performs 0 -  24% of toileting tasks.  Toileting Score = 1, Total Assistance. Patient requires  the following assistive device(s): Grab bar.    [facility] Bladder Management  Level of Assistance:  [place]  Frequency/Number of Accidents this Shift:  [facility] Bowel Management  Level of Assistance: [place]  Frequency/Number of Accidents this Shift: [facility] Bed/Chair/Wheelchair Transfer:  [facility] Toilet Transfer:  Toilet Transfer Score = 4.  Patient performs 75% or more  of effort and minimal assistance (little/incidental help/steadying) for  transferring to and from the toilet/commode, requiring: Patient requires the  following assistive device(s): Bedside Commode.  OTTONIEL Tub/Shower Transfer:  Tub Transfer Score = 4.  Patient performs 75% or more  of effort and minimal assistance (little/incidental help/lifting of one  limb/steadying) for transferring to and from the tub, requiring: Patient  requires the following assistive device(s): Tub bench. Grab bars.    Previously Documented Mode of Locomotion at Discharge: Field  OTTONIEL Expected Mode of Locomotion at Discharge: [facility] Walk/Wheelchair:  [facility] Stairs:  [facility] Comprehension:  [facility] Expression:  [facility] Social Interaction:  [facility] Problem Solving:  [facility] Memory:  [place]    Therapy Mode Minutes  Occupational Therapy: Individual: 75 minutes.  Physical Therapy: [place]  Speech Language Pathology:  [place]    Signed by:  Justo Henry, OTR/L

## 2018-02-01 NOTE — PLAN OF CARE
Problem: Patient Care Overview (Adult)  Goal: Plan of Care Review  Outcome: Ongoing (interventions implemented as appropriate)   02/01/18 2689   Outcome Evaluation   Outcome Summary/Follow up Plan  giving bolus tube feeds and lovenox injections.  stays with pt. at all times. Plans for discharge home tomorrow.   Patient Care Overview   Progress improving   Coping/Psychosocial Response Interventions   Plan Of Care Reviewed With patient;spouse     Goal: Adult Individualization and Mutuality  Outcome: Ongoing (interventions implemented as appropriate)      Problem: Stroke (Ischemic) (Adult)  Goal: Signs and Symptoms of Listed Potential Problems Will be Absent or Manageable (Stroke)  Outcome: Ongoing (interventions implemented as appropriate)      Problem: Fall Risk (Adult)  Goal: Absence of Falls  Outcome: Ongoing (interventions implemented as appropriate)      Problem: Pressure Ulcer Risk (Jon Scale) (Adult,Obstetrics,Pediatric)  Goal: Skin Integrity  Outcome: Ongoing (interventions implemented as appropriate)      Problem: Nutrition, Enteral (Adult)  Goal: Signs and Symptoms of Listed Potential Problems Will be Absent or Manageable (Nutrition, Enteral)  Outcome: Ongoing (interventions implemented as appropriate)

## 2018-02-02 NOTE — PLAN OF CARE
Problem: Patient Care Overview (Adult)  Goal: Plan of Care Review  Outcome: Ongoing (interventions implemented as appropriate)   02/02/18 0428   Outcome Evaluation   Outcome Summary/Follow up Plan Pt. is calm and cooperative with staff. still aphasia, but get better. Pt. can answer pain level. Complained of any pain to abdomen. Pain medicine given and relieved. Tube Feeding Jevity 1.2. Spouse gave TF to pt. last night. Will discharge today.    Patient Care Overview   Progress improving   Coping/Psychosocial Response Interventions   Plan Of Care Reviewed With patient;spouse       Problem: Stroke (Ischemic) (Adult)  Goal: Signs and Symptoms of Listed Potential Problems Will be Absent or Manageable (Stroke)  Outcome: Ongoing (interventions implemented as appropriate)   02/02/18 0428   Stroke (Ischemic)   Problems Assessed (Stroke (Ischemic)/TIA) all   Problems Present (Stroke (Ischemic)/TIA) muscle tone abnormal;eating/swallowing impairment;communication impairment;motor/sensory impairment       Problem: Fall Risk (Adult)  Goal: Absence of Falls  Outcome: Ongoing (interventions implemented as appropriate)   02/02/18 0428   Fall Risk (Adult)   Absence of Falls making progress toward outcome       Problem: Pressure Ulcer Risk (Jon Scale) (Adult,Obstetrics,Pediatric)  Goal: Skin Integrity  Outcome: Ongoing (interventions implemented as appropriate)   02/02/18 0428   Pressure Ulcer Risk (Jon Scale) (Adult,Obstetrics,Pediatric)   Skin Integrity making progress toward outcome

## 2018-02-02 NOTE — PROGRESS NOTES
LOS: 30 days   Patient Care Team:  Peng Murdock MD as PCP - General  Peng Murdock MD as PCP - Family Medicine  Isael Ireland MD as Referring Physician (Internal Medicine)    Chief Complaint: same    Subjective     History of Present Illness    Subjective Pt is awake and alert. No new issues. We discussed dc plans again. Pt will f/u with PCP Dr Murdock in [facility].  DC meds discussed as well.     History taken from: patient    Objective     Vital Signs  Temp:  [97.7 °F (36.5 °C)-98.4 °F (36.9 °C)] 97.7 °F (36.5 °C)  Heart Rate:  [89-98] 92  Resp:  [16-18] 16  BP: ()/(64-67) 102/67    Objective    Results Review:     I reviewed the patient's new clinical results.    Medication Review:     Assessment/Plan     Active Problems:    Expressive aphasia    Dyslipidemia    Cardioembolic stroke    Diastolic CHF, chronic    Aortic insufficiency    Mitral regurgitation    Atelectasis    CVA (cerebral vascular accident)      Assessment & Plan Ready for dc. DC summary dictated.  Per PT eval. Pt will need NC O2 at home.     Eduard Gordon MD  02/02/18  7:05 AM    Time:35 min

## 2018-02-02 NOTE — PROGRESS NOTES
Inpatient Rehabilitation Functional Measures Assessment    Functional Measures  OTTONIEL Eating:  Eating Score = 1, Total Assistance. Patient requires total  assistance for complete nutrition or supplemental nutrition/hydration.  OTTONIEL Grooming: [facility] Bathing:  [facility] Upper Body Dressing:  [facility] Lower Body Dressing:  [facility] Toileting:  [facility] Bladder Management  Level of Assistance:  [place]  Frequency/Number of Accidents this Shift:  [facility] Bowel Management  Level of Assistance: [place]  Frequency/Number of Accidents this Shift: [facility] Bed/Chair/Wheelchair Transfer:  [facility] Toilet Transfer:  [facility] Tub/Shower Transfer:  [place]    Previously Documented Mode of Locomotion at Discharge: Field  OTTONIEL Expected Mode of Locomotion at Discharge: [facility] Walk/Wheelchair:  [facility] Stairs:  [facility] Comprehension:  Auditory comprehension is the usual mode. Patient does not  comprehend complex/abstract information in their primary language without  assistance from a helper. Comprehension Score = 5, Supervision. Patient  comprehends basic daily needs or ideas greater than 90% of the time. Patient  requires stand by/rare prompting. Patient requires the following assistive  device(s): Glasses.  OTTONIEL Expression:  Vocal expression is the usual mode. Patient does not express  complex/abstract information in their primary language without a helper.  Expression Score = 3, Moderate Prompting. Patient expresses basic daily needs or  ideas 50-74% of the time. Patient requires moderate/some prompting. No assistive  devices were required.  OTTONIEL Social Interaction:  Social Interaction Score = 6, Modified Independent.  Patient is modified independent for social interaction, requiring: Requires  additional time.  OTTONIEL Problem Solving:  Patient does not make appropriate decisions in order to  solve complex problems without assistance from a helper. Problem Solving Score =  4, Minimal  Direction. Patient makes appropriate decisions in order to solve  routine problems 75-90% of the time. Patient requires minimal/occasional  direction for the following behavior(s): Difficulty weighing alternatives/making  choices. Difficulty initiating tasks. Poor judgment. Exhibits poor planning.  OTTONIEL Memory:  Memory Score = 3, Moderate Prompting. Patient recognizes and  remembers 50-74% of the time. Patient requires moderate/some prompting  for  memory for the following: Inability to recognize people or remember  instructions/directions requiring short-term recall (minutes, hours, days).  Limited recall of daily routine.    Therapy Mode Minutes  Occupational Therapy: Branch  Physical Therapy: Branch  Speech Language Pathology:  Branch    Signed by: Bella Mcneil RN

## 2018-02-02 NOTE — PROGRESS NOTES
Inpatient Rehabilitation Plan of Care Note    Plan of Care  Care Plan Reviewed - No updates at this time.    Safety    Performed Intervention(s)  Call bell in reach  Non skid footware  BA, chair alarm, safety strategies, falls protocol  items in reach      Sphincter Control    Performed Intervention(s)  Btrm schdule every 4 hours  Monitor I and O  Bowel program      Communication    Performed Intervention(s)  Allow time for responses  offer bathroom , reposition every 2 hrs      Psychosocial    Performed Intervention(s)  Therapeutic enviroment  Allow for verbalization of feelings from , monitor pt's mood.    Signed by: Bella Mcneil RN

## 2018-02-02 NOTE — PROGRESS NOTES
Inpatient Rehabilitation Plan of Care Note    Plan of Care  Care Plan Reviewed - No updates at this time.    Communication    Performed Intervention(s)  Allow time for responses    Signed by: Christiano Barker RN

## 2018-02-02 NOTE — PLAN OF CARE
Problem: Patient Care Overview (Adult)  Goal: Plan of Care Review  Outcome: Outcome(s) achieved Date Met: 02/02/18 02/02/18 0925   Outcome Evaluation   Outcome Summary/Follow up Plan Plans for discharge home today with spouse. Spouse has performed TF and given lovenox injections. Reviewed medications and skin care with spouse. Home Health to follow patient.    Patient Care Overview   Progress improving   Coping/Psychosocial Response Interventions   Plan Of Care Reviewed With spouse;patient     Goal: Adult Individualization and Mutuality  Outcome: Outcome(s) achieved Date Met: 02/02/18      Problem: Stroke (Ischemic) (Adult)  Goal: Signs and Symptoms of Listed Potential Problems Will be Absent or Manageable (Stroke)  Outcome: Outcome(s) achieved Date Met: 02/02/18      Problem: Fall Risk (Adult)  Goal: Absence of Falls  Outcome: Outcome(s) achieved Date Met: 02/02/18      Problem: Pressure Ulcer Risk (Jon Scale) (Adult,Obstetrics,Pediatric)  Goal: Skin Integrity  Outcome: Outcome(s) achieved Date Met: 02/02/18      Problem: Nutrition, Enteral (Adult)  Goal: Signs and Symptoms of Listed Potential Problems Will be Absent or Manageable (Nutrition, Enteral)  Outcome: Outcome(s) achieved Date Met: 02/02/18

## 2018-02-02 NOTE — PROGRESS NOTES
Inpatient Rehabilitation Functional Measures Assessment    Functional Measures  OTTONIEL Eating:  [facility] Grooming: [facility] Bathing:  [facility] Upper Body Dressing:  [facility] Lower Body Dressing:  [facility] Toileting:  [facility] Bladder Management  Level of Assistance:  [place]  Frequency/Number of Accidents this Shift:  [facility] Bowel Management  Level of Assistance: [place]  Frequency/Number of Accidents this Shift: [facility] Bed/Chair/Wheelchair Transfer:  [facility] Toilet Transfer:  [facility] Tub/Shower Transfer:  [place]    Previously Documented Mode of Locomotion at Discharge: Field  OTTONIEL Expected Mode of Locomotion at Discharge: [facility] Walk/Wheelchair:  [facility] Stairs:  [facility] Comprehension:  Both ( auditory and visual) modes of comprehension are used  equally. Comprehension Score = 6, Modified [place].  Patient comprehends  complex/abstract information in their primary language, requiring: Glasses.  Additional time.  OTTONIEL Expression:  Vocal expression is the usual mode. Expression Score = 6,  Modified Independent.  Patient expresses complex/abstract information in their  primary language with only mild difficulty with tasks. Pt. is aphasia, but got  better. Pt. can speak Yes, No, and Pain Level.  OTTONIEL Social Interaction:  Social Interaction Score = 3, Moderate Direction.  Patient interacts appropriately 50-74% of the time.  Patient requires  moderate/some direction for the following behavior(s): Non-interactive. spouse  helps her to express and interact with staff. .  OTTONIEL Problem Solving:  Patient does not make appropriate decisions in order to  solve complex problems without assistance from a helper. Problem Solving Score =  3, Moderate Direction. Patient makes appropriate decisions in order to solve  routine problems 50-74% of the time. Patient requires moderate/some direction  for the following behavior(s): Poor judgment. Difficulty completing tasks.  Decreased  awareness of performance. Inability to follow multi-step commands.  OTTONIEL Memory:  Activity was not observed. Pt. is aphasia. unable to assessment for  memory.    Therapy Mode Minutes  Occupational Therapy: Branch  Physical Therapy: Branch  Speech Language Pathology:  Branch    Signed by: Christiano Barker RN

## 2018-02-02 NOTE — DISCHARGE SUMMARY
HISTORY:  The patient is a 57-year-old  white female admitted here in transfer from the [facility] where she presented on 12/28/2017 following the onset of right-sided weakness, slurred speech.  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  The patient also has a history of throat cancer in 2005 for which she underwent radiation therapies.  The patient lives in [place], [place], in a single story home with her  who is quite involved and will be providing care at the time of discharge.      HOSPITAL COURSE:  The patient's hospital course has been fairly complicated as outlined below.  The patient was seen in consultation here by gastroenterology service who ultimately placed percutaneous endoscopic gastrostomy.  She has been followed by the cardiology service, radiation oncology, neurology, pulmonology.  The patient was diagnosed with metastatic lung carcinoma while at inpatient here on the rehabilitation service and is undergoing palliative radiation treatments at this time.  Radiation oncology service has been following as well. 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  The patient had also experienced neurologic change during her stay and was felt that she had additional embolic CVA involvement.  At the time of discharge, she was continuing on Lovenox for anticoagulation protection.      DIAGNOSTIC DATA:  CBC on 02/01/2018 showed hemoglobin 8.3, hematocrit 28.0 and these values were stable from previous levels.  RDW 15.5, platelets 440,000, white count 9.8.  Chemistry profile on 01/30/2018: Glucose 120, chloride 97, BUN/creatinine ratio of 227.  Albumin 2.7.  All other values of his CMP within normal limits.  Anemia profile on 01/08/2018, iron 18, ferritin 294, iron saturation of 8, transferrin 156,  TIBC 232 and a folate of 5.1.  CT angiogram of the head on 01/14/2018, showed faint petechial hemorrhage scattered in the left MCA territory, tiny subarachnoid hemorrhage, no mass affect.  Right cervical carotid artery shows no significant stenosis or irregularity.  MRI scan of the brain on 01/15/2018 multiple areas of acute to subacute infarction identified within the multiple vascular distributions.  Nuclear medicine scan large lobulated tumor mass filling the left lower lobe.  Multiple hypermetabolic lymph nodes scattered throughout the mediastinum.  Osseous metastatic lesion involving anterior inferior aspect of the left iliac bone, tiny metastasis in the L4 vertebral body.      DISCHARGE MEDICATIONS:    1.   The patient is to continue Tylenol as needed.   2.   Aspirin 325 per PEG every day.   3.   Norco 5/325 take 1 every 4 hour as needed for pain.    4.   Ultram 50 mg every 6 hours as needed for pain.    5.   Lovenox injections 0.6 mL every 12 hours.    6.   Continue Remeron 15 mg every evening.    7.   Atorvastatin 20 mg tablets per PEG every day.   8.   Folic acid 1 mg tablet per PEG every day.   9.   Vitamin B12, take 500 mcg tablet per PEG every day.    10. Evista 60 every day.       CONDITION:  The patient was discharged in stable condition.  She will be having home health through [facility], PT, OT and speech.  She did not qualify for home oxygen at this time.  She is recommended to have a wheelchair with cushion, bedside commode and tub bench.         I personally performed the services described in the documentation, reviewed the documentation recorded by the scribe in my presence and it accurately and completely records my words and action.

## 2018-02-02 NOTE — DISCHARGE INSTRUCTIONS
O2 @ 2L per nasal cannula with activity and at night.    Tube feeding- Jevity 1.2- 360cc at 8am, 12 noon, and 4pm and 240 cc at 8pm.  Flush with 150 cc water with each tube feeding.    Crush all medications and give through feeding tube.    Change dsg. around feeding tube daily.

## 2018-02-02 NOTE — PROGRESS NOTES
SECTION GG    Eating Performance Discharge: [place] does all of the effort. Patient does none  of the effort to complete the activity. Or, the assistance of 2 or more helpers  is required for the patient to complete the activity.    Signed by: Bella Mcneil RN

## 2018-02-13 NOTE — PROGRESS NOTES
Changed to match Epic documentation.                         Week 1                       Week 2  Physical Therapy  Individual           -                            217  Occupational Therapy  Individual           -                            180    Signed by: Emir Nevarez RN

## 2018-02-13 NOTE — PROGRESS NOTES
PPS CMG Coordinator  Inpatient Rehabilitation Discharge    Mode of Locomotion: Walking.    Discharge Against Medical Advice:  No.  Discharge Information  Patient Discharged Alive:  Yes  Discharge Destination/Living Setting: Home with Home Health Services  Diagnosis for Interruption/Death: ICD    Impairment Group: Stroke: 01.2 Right Body Involvement (Left Brain)    Comorbidities: ICD    Complications: ICD      OTTONIEL Bladder Accidents: 0  - Accidents.  Patient used medications/device this  shift.  2/1/2018 11:39:00 AM  Bladder Score = 6. Patient has not had an accident, but uses a  device/medication.  OTTONIEL Bowel Accident: 0  - Accidents.  Patient used medications/device this shift.   1/27/2018 10:28:00 AM  Bowel Score = 7. Patient has no accidents.      QUALITY INDICATORS  Health Conditions: Fall(s) Since Admission:  Yes  Number of Falls with No Injury: One  Number of Falls with Injury (except major):  None.  Number of Falls with Major Injury:  None.  Section M. Skin Conditions Discharge:  Unhealed Pressure Ulcer(s) at Stage 1 or  Higher:  No    . Current Number of Unhealed Pressure Ulcers  Branch    . Worsening in Pressure Ulcer Status Since Admission:  Branch    . Healed Pressure Ulcer(s):    Number of Healed Stage 1: 0  Number of Healed Stage 2: 0  Number of Healed Stage 3: 0  Number of Healed Stage 4: 0    . Influenza Vaccine - Discharge  Received in this facility for this year's influenza vaccination season:  No.  Influenza Vaccine Not Received Due To: Received outside of this facility.    Date Influenza Vaccine Received (if applicable)  Text Entry    Signed by: Emir Nevarez RN

## 2018-02-16 NOTE — PROGRESS NOTES
COMPLETION / CLOSURE NOTE    Diagnosis: Bone metastasis    Dates of treatment:  1/17/2018 - 1/30/2018.  Treatment Site - left iliac wing  Treatment Intent - Palliative  Total Dose in cGy - 3000  Number of Treatments - 10  Dose per fraction - 300 cGy per fraction  Fractions per day - 1 fx/day  Fractions per week - 5 fx/week  Treatment Type - AP/PA fields, 3D  Energy - 18 MVP  Normalization - Isocenter, Prescribed at 100%  Imaging/Field Verification - Simulation before first treatment to verify field, blocking, placement and positioning, Simulation for all ports of change, Weekly port films of all ports  Additional comments: - 2-d bony match    Tolerance: Tolerated the above treatments well without any significant side effects, and completed the total treatments in 13 elapsed days.    Disposition: no routine follow-up scheduled in our department.     Ezekiel Naqvi Jr., MD

## 2018-02-27 PROBLEM — R50.9 FEVER: Status: ACTIVE | Noted: 2018-01-01

## 2018-02-27 PROBLEM — D50.9 MICROCYTIC ANEMIA: Status: ACTIVE | Noted: 2018-01-01

## 2018-02-27 NOTE — PROGRESS NOTES
[facility] GROUP OUTPATIENT FOLLOW UP CLINIC VISIT    REASON FOR FOLLOW-UP:    1.  Metastatic non-small cell lung cancer originating in the left lower lobe with left hilar and mediastinal lymphadenopathy as well as bone metastases.  PD-L1 high at 50%.  EGFR/ALK/ROS1 negative.  PET scan performed on 1/15/2018.  2.  Palliative radiation to the left iliac wing administered in January 2018.  She received 3000 cGy in 10 fractions from 1/17/2018 through 1/30/2018.  3.  Right calf vein DVT  4.  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  No evidence of metastatic disease in the brain on MRI imaging on 1/15/2018.  Recurrent CVA during acute rehabilitation.  5.  Dysphagia: Status post PEG tube placement on 1/29/2018.    HISTORY OF PRESENT ILLNESS:  Masha Porter is a 57 y.o. female who returns today for follow up of the above issue.  She was seen during her recent rehabilitation stay at [facility].  She initially presented to [facility] with probable embolic stroke.  She presented with right-sided weakness and aphasia.  MRI of the brain on 12/29/2017 showed numerous acute and subacute infarcts in the MCA territory, right posterior MCA territory, and bilateral cerebellar hemispheres.  She had a mechanical thrombectomy and was transferred to the rehabilitation unit at [facility] for acute rehabilitation.    Imaging revealed a left lower lobe lung mass.  A CT-guided lung biopsy was performed on 1/10/2018 showing adenosquamous carcinoma.  EGFR/ALK/ROS 1 negative and PD L1 50%.    A PET scan was performed on 1/15/2018 showing a large left lower lobe lung mass with multiple hypermetabolic lymph nodes in the mediastinum.  A left adrenal metastasis was noted as well as bony metastatic disease with the largest lesion at the left iliac wing.    Radiation therapy to the left iliac wing for pain management was performed.    She subsequently had recurrent embolic CVA.   Ultimately she was discharged with Lovenox.  She has right hemiparesis as well as expressive aphasia.    She returns to the office today to 20/7/2018 for follow-up to discuss treatment options.  She is improving.  He aphasia persists.  Her  provides a lot of history today.  Right-sided weakness is improving.  She and her  are managing at home.  She did have a PEG tube placed and is nothing by mouth.    He states that he notices that she winces and pain with exertion although she denies any pain today.  Hydrocodone/acetaminophen has not been really effective.  He requests a stronger pain medication today.  In addition, he notes fever up to 102 for the past couple of weeks.  The highest fever was yesterday at over 102.  Fevers are only at night.  No other obvious signs or symptoms of infection.  No urinary symptoms.  No significant skin changes.  No oral lesions.  No new respiratory symptoms.  She denies any shortness of breath.  She has a mild cough.    PAST MEDICAL, SURGICAL, FAMILY, AND SOCIAL HISTORIES were reviewed with the patient and in the electronic medical record, and were updated if indicated.    ALLERGIES:  No Known Allergies    MEDICATIONS:  The medication list has been reviewed with the patient by the medical assistant, and the list has been updated in the electronic medical record, which I reviewed.  Medication dosages and frequencies were confirmed to be accurate.    REVIEW OF SYSTEMS:  PAIN:  See Vital Signs below.  GENERAL:  See history of present illness  SKIN:  No rashes or non-healing lesions.  HEME/LYMPH:  No abnormal bleeding.  No palpable lymphadenopathy.  EYES:  No vision changes or diplopia.  ENT:  No sore throat or difficulty swallowing.  RESPIRATORY:  See history of present illness  CARDIOVASCULAR:  No chest pain, palpitations, orthopnea, or dyspnea on exertion.  GASTROINTESTINAL:  No abdominal pain, nausea, vomiting, constipation, diarrhea, melena, or hematochezia.  PEG tube  in place.  GENITOURINARY:  No dysuria or hematuria.  MUSCULOSKELETAL:  No joint pain, swelling, or erythema.  NEUROLOGIC:  Right hemiparesis and expressive aphasia  PSYCHIATRIC:  No depression, anxiety, or mood changes.    Vitals:    02/27/18 1302   BP: 109/72   Pulse: 95   Resp: 14   Temp: 97.9 °F (36.6 °C)   TempSrc: Oral   SpO2: 95%   Weight: 57.2 kg (126 lb)   PainSc: 0-No pain     ECOG PS 3    PHYSICAL EXAMINATION:  GENERAL:  Well-developed  female; Awake and alert.  Expressive aphasia noted.  Sitting in a wheelchair.  Actually looks much older than her stated age at this point.  SKIN:  Warm and dry, without rashes, purpura, or petechiae.  HEAD:  Normocephalic, atraumatic.  EARS:  Hearing intact.  MOUTH:  No stomatitis or ulcers.  Lips are normal.  Thick clear secretions  THROAT:  Oropharynx without lesions or exudates.  NECK:  Supple with good range of motion; no thyromegaly or masses; no JVD or bruits.  LYMPHATICS:  No cervical, supraclavicular, axillary lymphadenopathy.  CHEST:  Decreased air movement throughout the left hemithorax.  The right lung is clear to auscultation bilaterally.  HEART:  Regular rate; normal rhythm.  No murmurs, gallops or rubs.  ABDOMEN:  Soft, non-tender, non-distended.  Normal active bowel sounds.  No organomegaly.  A PEG tube is in place in the left upper quadrant.  EXTREMITIES:  No clubbing, cyanosis, or edema.  NEUROLOGICAL:  Right-sided weakness noted    DIAGNOSTIC DATA:  Lab Results   Component Value Date    WBC 12.22 (H) 02/27/2018    HGB 9.0 (L) 02/27/2018    HCT 28.7 (L) 02/27/2018    MCV 79.1 (L) 02/27/2018     (H) 02/27/2018       IMAGING:  PET scan 1/15/2018    IMPRESSION:  Large lobulated tumor mass nearly completely filling the  left lower lobe showing intense hypermetabolism. There are multiple  hypermetabolic lymph nodes scattered throughout the mediastinum  consistent with metastatic disease. A small hypermetabolic left adrenal  mass is consistent with a  metastatic lesion. There is an osseous  metastatic lesion producing bony destruction of the anterior and  inferior aspect of the left iliac bone. A tiny osseous metastasis is  also suspected in the L4 vertebral body.    ASSESSMENT:  This is a 57 y.o. female with:  1.  Metastatic non-small cell (adenosquamous) lung cancer with a large LLL mass with mediastinal LAD and a L adrenal mass and L iliac and L4 metastases.    - PDL-1 high (50%) thus is a candidate for first line Keytruda   - EGFR/ALK/ROS 1 negative   - Discussed therapy with Keytruda today with the patient and her .  Performance status is marginal.  She is interested in treatment at this point.  We will plan to initiate therapy in the next couple of weeks.  She will have a teaching session.  I briefly outlined potential adverse effects for her today.   -  New baseline CT chest abdomen and pelvis and MRI brain to be done prior to starting therapy as last imaging otherwise was a PET scan done on 1/15/2018.    2.  Multifocal embolic CVA with new events over the weekend:  She was treated with Eliquis, held for the CT biopsy.  Lovenox 1 mg/kg (50 mg) resumed 1/10 but she had recurrent CVA on 1/14 in spite of this.  MECCA not done as not felt to yield any additional information that would change the plan.  Discharged on Lovenox 60 mg twice daily which she is tolerating very well.  Continue this at this time.    3.  Metastatic lesion to the left iliac wing:   - s/p 10 fractions of radiation in January   - She continues to have some pain in this area.  I gave her prescription today for liquid oxycodone to use per tube.    4.  Aphasia and hemiparesis secondary to CVA   - s/p PEG tube on 1/29/2018    5.  Microcytic anemia   - Baseline iron studies, ferritin, reticulocyte count, vitamin B12 level today.  If deficient, she will need to start supplementation.    6.  Thrombocytosis:  Likely reactive    7.  Fever at night: No obvious etiology.  No outward signs of  infection.  Check urinalysis and 2 sets of blood cultures today.  Again, we are pursuing CT imaging of the chest abdomen and pelvis as well.  Question tumor fever.    8.  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    PLAN:  1.  Baseline CT imaging of the chest abdomen pelvis and an MRI of the brain to be done in the near future  2.  Laboratory evaluation as noted above for anemia  3.  Blood cultures and urinalysis  4.  Continue acetaminophen as needed for fever  5.  Prescription for liquid oxycodone given to her today for cancer related pain  6.  Continue Lovenox 60 mg twice daily  7.  Teaching session for Keytruda 200 mg IV every 21 days  8.  She will see a physician back in the office with cycle #2.

## 2018-02-28 NOTE — TELEPHONE ENCOUNTER
I called and spoke with Mr. Porter.  His wife is home from [facility].  She is doing fair.  She has a fever in the evening fairly consistent 100-101.  He did tell Dr. Joy today and he felt it might be a urine infection.  He also said last night he woke up in the night and she was not in bed.  He found her on the bathroom floor. She said she slid off the toilet.  She did not appear confused and walked back to bed with assist.  She still has a PEG Tube and will for awhile.  Jevity 1.2, 340 cc every 4 hours and he has been giving her more water in case she has a UTI.  Her medications include Lipitor 20 mg per PEG,  mg daily/per PEG and Lovenox weight based daily.  We went over signs and symptoms of stroke and to call 911 immediately.   3.  DEMAR Meredith RN

## 2018-02-28 NOTE — TELEPHONE ENCOUNTER
----- Message from Rodolfo Joy MD sent at 2/28/2018  2:50 PM EST -----  Please call the patient regarding her result.  Please let her  know there is no evidence for infection on our labs so far.  Thanks,  JEAN PIERRE      Spoke with pt's . Informed him blood cultures were negative. He wanted to know results of UA. Reviewed results with . Informed  I would discuss with Dr. Joy and call him back. Message sent to Dr. Joy

## 2018-02-28 NOTE — TELEPHONE ENCOUNTER
Attempted to call patient and . No answer. No voicemail available.     ----- Message from Rodolfo Joy MD sent at 2/28/2018  2:50 PM EST -----  Please call the patient regarding her result.  Please let her  know there is no evidence for infection on our labs so far.  Thanks,  JEAN PIERRE

## 2018-03-01 NOTE — PROGRESS NOTES
Patient's spouse called asking questions about a prescription he picked up for his wife.  Prescription was for ondansetron.  Pharmacy only filled 6 pills due to insurance.  That information was relayed to patient's spouse.  Patient to only take Zofran on a prn basis.  Pt spouse v/u.  Future refills for Zofran will need a pre-authorization in order to fill 30 pills.

## 2018-03-15 NOTE — PROGRESS NOTES
Subjective     PATIENT NAME:  Masha Porter  YOB: 1960  PATIENTS AGE:  57 y.o.  PATIENTS SEX:  female  DATE OF SERVICE:  03/15/2018  PROVIDER:  CANDE Lemons      ____________________PATIENT EDUCATION____________________    PATIENT EDUCATION:  Today I met with the patient to discuss the chemotherapy regimen recommended for treatment of her disease.  The patient was given explanation of treatment premed side effects including office policy that prohibits patients to drive if sedating medications are administered, MD explanation given regarding benefits, side effects, toxicities and goals of treatment.  The patient received a Chemotherapy/Biotherapy Plan Summary including diagnosis and specific treatment plan.    SIDE EFFECTS:  Common side effects were discussed with the patient and/or significant other.  Discussion included hair loss/discoloration, anemia/fatigue, infection/chills/fever, appetite, bleeding risk/precautions, constipation, diarrhea, mouth sores, taste alteration, loss of appetite,nausea/vomiting, peripheral neuropathy, skin/nail changes, rash, muscle aches/weakness, photosensitivity, weight gain/loss, hearing loss, dizziness, menopausal symptoms, menstrrual irregularity, sterility, high blood pressure, heart damage, liver damage, lung damage, kidney damage, DVT/PE risk, fluid retention, pleural/pericardial effusion, somnolence, electrolyte/LFT imbalance, vein exercises and/or the possible need for vascular access/port placement.  The patient was advice that although uncommon, leakage of an infused medication from the vein or venous access device (port) may lead to skin breakdown and/or other tissue damage.  The patient was advised that he/she may have pain, bleeding, and/or bruising from the insertion of a needle in their vein or venous access device (port).  The patient was further advised that, in spite of proper technique, infection with redness and irritation may rarely  occur at the site where the needle was inserted.  The patient was advised that if complications occur, additional medical treatment is available.    Discussion also included side effects specific to drugs in the treatment plan, specifically Keytruda.    A total of 45 minutes were spent with the patient, with 100% of time spent in education and counseling.

## 2018-03-18 NOTE — PROGRESS NOTES
Patient's  called today about increasing shortness of breath after Keytruda.  She is evidently comfortable at rest and it may be that we have yet to treat her actively with immunotherapy.  At this point he is willing to watch her and if she worsens acutely she come back to the hospital.  If she remains reasonably comfortable at rest we would continue this treatment over the next several weeks to give her the benefit out about whether it's going to be effective or not.

## 2018-03-23 NOTE — PROGRESS NOTES
VM rec from Ebony JONAS NP at [facility]. She states pt is only getting 10 tabs of her Zofran at a time. I spoke with Ebony and let her know I will work on the PA. Once approved, I will notify the pharmacy.    I have submitted a PA to [facility] (PBM for her [employer] plan) through covermymeds.    Awaiting decision

## 2018-03-23 NOTE — PROGRESS NOTES
"Pt's , Liu, called ([phone]) stating the pt has had an average of 3 liquid BMs per day for the last 3 days (and up to as many as 5 because she often has the BMs with urination). She is tolerating the G-tube feedings and has little nausea. The pt has not tried any OTC medication such as Imodium. Pt continues to have evening fevers which the pt's spouse states is controlled with tylenol. He does mention that the fevers are starting a little earlier in the evening.Per Ebony, the pt's spouse was instructed to obtain liquid Imodium. The pt should take 4mg with the first diarrhea stool and then 2mg with each additional loose stool for a maximum of 6-8 doses per day. He was instructed to call the office ([phone]) tomorrow morning if the stool continues to be liquid (with no return to form/consistency) or persistent. The pt's spouse v/u of above.    The pt's spouse will bring a form to the office on Tuesday, 3/27/18 that he received  From his pharmacy explaining how the pt's pain med should be prescribed so that the pt receives coverage for more days than the current 15 days. The pt's spouse stated that the pharmacy mentioned possibly changing the prescription to tablets that could be crushed for G-tube administration; he stated that he would be fine doing this if needed. Liu was told that nursing staff will review this form on Tuesday and then consult with the doctor to determine dosing. Liu v/u.    Liu mentioned also that the pt sleeps only about 2 hours per night and for short periods (about 15\") during the day. He explained that he mentioned this to Dr. Joy but felt that he did not want to prescribe anything for this. The pt's spouse mentioned that lack of sleep is getting tough for both the pt and himself. He was told that the nurse would check into this to see if any recommendations could be made. A note was sent to Dr. Joy regarding this. Dr. Joy authorized Temazepam  7.5mg, 1 po qhs. " Pt's spouse was informed and [facility] pharmacy in [place] was called.     The pt's spouse also wants to know if ondansetron has been approved for more than 5 tabs at a time. Per CANDE MEDRANO, we will check on this and let him know.  He was later told that Kary MATHEWS Is checking with insurance for a PA; the pharmacy will receive notice of the authorization from whom the pt should hear from. The pt v/u.

## 2018-03-24 PROBLEM — J96.00 ACUTE RESPIRATORY FAILURE (HCC): Status: ACTIVE | Noted: 2018-01-01

## 2018-03-25 PROBLEM — J18.9 OBSTRUCTIVE PNEUMONIA: Status: ACTIVE | Noted: 2018-01-01

## 2018-03-26 NOTE — PROGRESS NOTES
Fax rec from [facility] stating Ondansetron did not need a PA (I requested Qty of 90 per 30 days).

## 2018-03-28 LAB
ABO + RH BLD: NORMAL
ABO + RH BLD: NORMAL
BH BB BLOOD EXPIRATION DATE: NORMAL
BH BB BLOOD EXPIRATION DATE: NORMAL
BH BB BLOOD TYPE BARCODE: 9500
BH BB BLOOD TYPE BARCODE: 9500
BH BB DISPENSE STATUS: NORMAL
BH BB DISPENSE STATUS: NORMAL
BH BB PRODUCT CODE: NORMAL
BH BB PRODUCT CODE: NORMAL
BH BB UNIT NUMBER: NORMAL
BH BB UNIT NUMBER: NORMAL
UNIT  ABO: NORMAL
UNIT  ABO: NORMAL
UNIT  RH: NORMAL
UNIT  RH: NORMAL

## 2018-03-29 LAB
BACTERIA SPEC AEROBE CULT: NORMAL
BACTERIA SPEC AEROBE CULT: NORMAL

## 2018-04-03 ENCOUNTER — APPOINTMENT (OUTPATIENT)
Dept: LAB | Facility: HOSPITAL | Age: 58
End: 2018-04-03

## 2018-04-03 ENCOUNTER — APPOINTMENT (OUTPATIENT)
Dept: ONCOLOGY | Facility: CLINIC | Age: 58
End: 2018-04-03

## 2018-04-04 ENCOUNTER — TELEPHONE (OUTPATIENT)
Dept: ONCOLOGY | Facility: HOSPITAL | Age: 58
End: 2018-04-04

## 2018-04-04 NOTE — TELEPHONE ENCOUNTER
"Pt's  is calling wanting to speaking with a physician regarding his wife's cancer diagnosis. He found evidence that the pt had a \"cancer screening\" 3 months prior to her met lung ca diagnosis at a facility he believes is in [place]. He believes one of 2 things and wants to get the MD opinion. 1.) the pt knew she had cancer and did not tell him or 2.) the \"cancer screening\" missed the diagnosis.    He could not give me any detail to what the screening entailed. Dr. Joy was the only MD to see the pt in our office but after that consult, she was in [facility] for a while and was seen by many of our MD's, of which Dr. Trivedi was the last one to see her.     If you need to contact the  his name is Liu Porter and his phone number is [phone]    Above message sent to Dr. Joy and Nursing Manager Fabby Fuller         "

## 2018-04-05 ENCOUNTER — APPOINTMENT (OUTPATIENT)
Dept: ONCOLOGY | Facility: HOSPITAL | Age: 58
End: 2018-04-05

## (undated) DEVICE — BITEBLOCK OMNI BLOC

## (undated) DEVICE — TUBING, SUCTION, 1/4" X 10', STRAIGHT: Brand: MEDLINE

## (undated) DEVICE — CANN NASL CO2 TRULINK W/O2 A/

## (undated) DEVICE — Device: Brand: DEFENDO AIR/WATER/SUCTION AND BIOPSY VALVE

## (undated) DEVICE — TBG 02 CRUSH RESIST LF CLR 7FT